# Patient Record
Sex: FEMALE | Race: WHITE | Employment: OTHER | URBAN - METROPOLITAN AREA
[De-identification: names, ages, dates, MRNs, and addresses within clinical notes are randomized per-mention and may not be internally consistent; named-entity substitution may affect disease eponyms.]

---

## 2017-11-30 PROCEDURE — 81001 URINALYSIS AUTO W/SCOPE: CPT | Performed by: FAMILY MEDICINE

## 2018-04-25 PROCEDURE — 82607 VITAMIN B-12: CPT | Performed by: FAMILY MEDICINE

## 2018-06-07 PROBLEM — M65.932 TENOSYNOVITIS OF LEFT WRIST: Status: ACTIVE | Noted: 2018-06-07

## 2018-06-07 PROBLEM — M65.9 TENOSYNOVITIS OF LEFT WRIST: Status: ACTIVE | Noted: 2018-06-07

## 2018-07-03 PROCEDURE — 82607 VITAMIN B-12: CPT | Performed by: FAMILY MEDICINE

## 2018-07-19 PROBLEM — Z47.89 ORTHOPEDIC AFTERCARE: Status: ACTIVE | Noted: 2018-07-19

## 2018-08-15 PROBLEM — M25.632 STIFFNESS OF LEFT WRIST JOINT: Status: ACTIVE | Noted: 2018-08-15

## 2018-11-13 PROBLEM — N81.4 UTEROVAGINAL PROLAPSE: Status: ACTIVE | Noted: 2018-11-13

## 2018-11-13 PROBLEM — N39.3 STRESS INCONTINENCE: Status: ACTIVE | Noted: 2018-11-13

## 2018-11-13 PROBLEM — N39.41 URGE INCONTINENCE: Status: ACTIVE | Noted: 2018-11-13

## 2018-11-28 PROCEDURE — 87624 HPV HI-RISK TYP POOLED RSLT: CPT | Performed by: OBSTETRICS & GYNECOLOGY

## 2018-11-28 PROCEDURE — 88175 CYTOPATH C/V AUTO FLUID REDO: CPT | Performed by: OBSTETRICS & GYNECOLOGY

## 2019-01-20 ENCOUNTER — HOSPITAL ENCOUNTER (INPATIENT)
Facility: HOSPITAL | Age: 64
LOS: 4 days | Discharge: HOME | DRG: 287 | End: 2019-01-24
Attending: EMERGENCY MEDICINE | Admitting: FAMILY MEDICINE
Payer: COMMERCIAL

## 2019-01-20 ENCOUNTER — APPOINTMENT (OUTPATIENT)
Dept: GENERAL RADIOLOGY | Facility: HOSPITAL | Age: 64
DRG: 287 | End: 2019-01-20
Attending: EMERGENCY MEDICINE
Payer: COMMERCIAL

## 2019-01-20 DIAGNOSIS — R77.8 TROPONIN I ABOVE REFERENCE RANGE: ICD-10-CM

## 2019-01-20 DIAGNOSIS — D64.9 ANEMIA, UNSPECIFIED TYPE: ICD-10-CM

## 2019-01-20 DIAGNOSIS — R07.89 CHEST PAIN, ATYPICAL: Primary | ICD-10-CM

## 2019-01-20 DIAGNOSIS — G43.909 MIGRAINE WITHOUT STATUS MIGRAINOSUS, NOT INTRACTABLE, UNSPECIFIED MIGRAINE TYPE: ICD-10-CM

## 2019-01-20 PROBLEM — R79.89 TROPONIN I ABOVE REFERENCE RANGE: Status: ACTIVE | Noted: 2019-01-20

## 2019-01-20 LAB
ADENOVIRUS PCR:: NEGATIVE
ALBUMIN SERPL-MCNC: 3.5 G/DL (ref 3.1–4.5)
ALBUMIN/GLOB SERPL: 0.8 {RATIO} (ref 1–2)
ALP LIVER SERPL-CCNC: 125 U/L (ref 50–130)
ALT SERPL-CCNC: 23 U/L (ref 14–54)
ANION GAP SERPL CALC-SCNC: 4 MMOL/L (ref 0–18)
APTT PPP: 32.5 SECONDS (ref 26.1–34.6)
APTT PPP: 60.6 SECONDS (ref 26.1–34.6)
AST SERPL-CCNC: 23 U/L (ref 15–41)
B PERT DNA SPEC QL NAA+PROBE: NEGATIVE
BASOPHILS # BLD AUTO: 0.05 X10(3) UL (ref 0–0.1)
BASOPHILS NFR BLD AUTO: 0.9 %
BILIRUB SERPL-MCNC: 0.2 MG/DL (ref 0.1–2)
BUN BLD-MCNC: 12 MG/DL (ref 8–20)
BUN/CREAT SERPL: 12.6 (ref 10–20)
C PNEUM DNA SPEC QL NAA+PROBE: NEGATIVE
CALCIUM BLD-MCNC: 8.5 MG/DL (ref 8.3–10.3)
CHLORIDE SERPL-SCNC: 105 MMOL/L (ref 101–111)
CK SERPL-CCNC: 174 IU/L (ref 26–192)
CO2 SERPL-SCNC: 27 MMOL/L (ref 22–32)
CORONAVIRUS 229E PCR:: NEGATIVE
CORONAVIRUS HKU1 PCR:: NEGATIVE
CORONAVIRUS NL63 PCR:: NEGATIVE
CORONAVIRUS OC43 PCR:: POSITIVE
CREAT BLD-MCNC: 0.95 MG/DL (ref 0.55–1.02)
EOSINOPHIL # BLD AUTO: 0.5 X10(3) UL (ref 0–0.3)
EOSINOPHIL NFR BLD AUTO: 8.8 %
ERYTHROCYTE [DISTWIDTH] IN BLOOD BY AUTOMATED COUNT: 14.8 % (ref 11.5–16)
FLUAV RNA SPEC QL NAA+PROBE: NEGATIVE
FLUBV RNA SPEC QL NAA+PROBE: NEGATIVE
GLOBULIN PLAS-MCNC: 4.3 G/DL (ref 2.8–4.4)
GLUCOSE BLD-MCNC: 130 MG/DL (ref 70–99)
HCT VFR BLD AUTO: 36 % (ref 34–50)
HGB BLD-MCNC: 11.2 G/DL (ref 12–16)
IMMATURE GRANULOCYTE COUNT: 0.01 X10(3) UL (ref 0–1)
IMMATURE GRANULOCYTE RATIO %: 0.2 %
LYMPHOCYTES # BLD AUTO: 1.13 X10(3) UL (ref 0.9–4)
LYMPHOCYTES NFR BLD AUTO: 19.9 %
M PROTEIN MFR SERPL ELPH: 7.8 G/DL (ref 6.4–8.2)
MCH RBC QN AUTO: 23.8 PG (ref 27–33.2)
MCHC RBC AUTO-ENTMCNC: 31.1 G/DL (ref 31–37)
MCV RBC AUTO: 76.6 FL (ref 81–100)
METAPNEUMOVIRUS PCR:: NEGATIVE
MONOCYTES # BLD AUTO: 0.71 X10(3) UL (ref 0.1–1)
MONOCYTES NFR BLD AUTO: 12.5 %
MYCOPLASMA PNEUMONIA PCR:: NEGATIVE
NEUTROPHIL ABS PRELIM: 3.27 X10 (3) UL (ref 1.3–6.7)
NEUTROPHILS # BLD AUTO: 3.27 X10(3) UL (ref 1.3–6.7)
NEUTROPHILS NFR BLD AUTO: 57.7 %
OSMOLALITY SERPL CALC.SUM OF ELEC: 284 MOSM/KG (ref 275–295)
PARAINFLUENZA 1 PCR:: NEGATIVE
PARAINFLUENZA 2 PCR:: NEGATIVE
PARAINFLUENZA 3 PCR:: NEGATIVE
PARAINFLUENZA 4 PCR:: NEGATIVE
PLATELET # BLD AUTO: 264 10(3)UL (ref 150–450)
POTASSIUM SERPL-SCNC: 3.8 MMOL/L (ref 3.6–5.1)
RBC # BLD AUTO: 4.7 X10(6)UL (ref 3.8–5.1)
RED CELL DISTRIBUTION WIDTH-SD: 41.1 FL (ref 35.1–46.3)
RHINOVIRUS/ENTERO PCR:: NEGATIVE
RSV RNA SPEC QL NAA+PROBE: NEGATIVE
SODIUM SERPL-SCNC: 136 MMOL/L (ref 136–144)
TROPONIN I SERPL-MCNC: 0.11 NG/ML (ref ?–0.05)
TROPONIN I SERPL-MCNC: 1.04 NG/ML (ref ?–0.05)
WBC # BLD AUTO: 5.7 X10(3) UL (ref 4–13)

## 2019-01-20 PROCEDURE — 85730 THROMBOPLASTIN TIME PARTIAL: CPT | Performed by: INTERNAL MEDICINE

## 2019-01-20 PROCEDURE — 93005 ELECTROCARDIOGRAM TRACING: CPT

## 2019-01-20 PROCEDURE — 87581 M.PNEUMON DNA AMP PROBE: CPT | Performed by: EMERGENCY MEDICINE

## 2019-01-20 PROCEDURE — 85730 THROMBOPLASTIN TIME PARTIAL: CPT | Performed by: EMERGENCY MEDICINE

## 2019-01-20 PROCEDURE — 94664 DEMO&/EVAL PT USE INHALER: CPT

## 2019-01-20 PROCEDURE — 84484 ASSAY OF TROPONIN QUANT: CPT | Performed by: EMERGENCY MEDICINE

## 2019-01-20 PROCEDURE — 94640 AIRWAY INHALATION TREATMENT: CPT

## 2019-01-20 PROCEDURE — 87999 UNLISTED MICROBIOLOGY PX: CPT

## 2019-01-20 PROCEDURE — 99285 EMERGENCY DEPT VISIT HI MDM: CPT | Performed by: EMERGENCY MEDICINE

## 2019-01-20 PROCEDURE — 80053 COMPREHEN METABOLIC PANEL: CPT | Performed by: EMERGENCY MEDICINE

## 2019-01-20 PROCEDURE — 96375 TX/PRO/DX INJ NEW DRUG ADDON: CPT | Performed by: EMERGENCY MEDICINE

## 2019-01-20 PROCEDURE — 71046 X-RAY EXAM CHEST 2 VIEWS: CPT | Performed by: EMERGENCY MEDICINE

## 2019-01-20 PROCEDURE — 87633 RESP VIRUS 12-25 TARGETS: CPT | Performed by: EMERGENCY MEDICINE

## 2019-01-20 PROCEDURE — 85025 COMPLETE CBC W/AUTO DIFF WBC: CPT | Performed by: EMERGENCY MEDICINE

## 2019-01-20 PROCEDURE — 87798 DETECT AGENT NOS DNA AMP: CPT | Performed by: EMERGENCY MEDICINE

## 2019-01-20 PROCEDURE — 93010 ELECTROCARDIOGRAM REPORT: CPT | Performed by: EMERGENCY MEDICINE

## 2019-01-20 PROCEDURE — 82550 ASSAY OF CK (CPK): CPT | Performed by: EMERGENCY MEDICINE

## 2019-01-20 PROCEDURE — 96374 THER/PROPH/DIAG INJ IV PUSH: CPT | Performed by: EMERGENCY MEDICINE

## 2019-01-20 PROCEDURE — 87486 CHLMYD PNEUM DNA AMP PROBE: CPT | Performed by: EMERGENCY MEDICINE

## 2019-01-20 RX ORDER — ALBUTEROL SULFATE 90 UG/1
2 AEROSOL, METERED RESPIRATORY (INHALATION) EVERY 6 HOURS PRN
Status: DISCONTINUED | OUTPATIENT
Start: 2019-01-20 | End: 2019-01-24

## 2019-01-20 RX ORDER — HEPARIN SODIUM AND DEXTROSE 10000; 5 [USP'U]/100ML; G/100ML
INJECTION INTRAVENOUS CONTINUOUS
Status: DISCONTINUED | OUTPATIENT
Start: 2019-01-20 | End: 2019-01-21

## 2019-01-20 RX ORDER — ONDANSETRON 2 MG/ML
4 INJECTION INTRAMUSCULAR; INTRAVENOUS ONCE
Status: COMPLETED | OUTPATIENT
Start: 2019-01-20 | End: 2019-01-20

## 2019-01-20 RX ORDER — POTASSIUM CHLORIDE 20 MEQ/1
40 TABLET, EXTENDED RELEASE ORAL ONCE
Status: COMPLETED | OUTPATIENT
Start: 2019-01-20 | End: 2019-01-20

## 2019-01-20 RX ORDER — VERAPAMIL HYDROCHLORIDE 240 MG/1
240 TABLET, FILM COATED, EXTENDED RELEASE ORAL
Status: DISCONTINUED | OUTPATIENT
Start: 2019-01-21 | End: 2019-01-24

## 2019-01-20 RX ORDER — FAMOTIDINE 20 MG/1
20 TABLET ORAL 2 TIMES DAILY PRN
Status: ON HOLD | COMMUNITY
End: 2019-01-22

## 2019-01-20 RX ORDER — SIMVASTATIN 40 MG
40 TABLET ORAL NIGHTLY
Status: ON HOLD | COMMUNITY
End: 2019-01-22

## 2019-01-20 RX ORDER — ATORVASTATIN CALCIUM 20 MG/1
20 TABLET, FILM COATED ORAL NIGHTLY
Status: DISCONTINUED | OUTPATIENT
Start: 2019-01-20 | End: 2019-01-24

## 2019-01-20 RX ORDER — ASPIRIN 81 MG/1
324 TABLET, CHEWABLE ORAL ONCE
Status: DISCONTINUED | OUTPATIENT
Start: 2019-01-20 | End: 2019-01-21

## 2019-01-20 RX ORDER — HYDROMORPHONE HYDROCHLORIDE 1 MG/ML
0.5 INJECTION, SOLUTION INTRAMUSCULAR; INTRAVENOUS; SUBCUTANEOUS ONCE
Status: COMPLETED | OUTPATIENT
Start: 2019-01-20 | End: 2019-01-20

## 2019-01-20 RX ORDER — FAMOTIDINE 20 MG/1
20 TABLET ORAL 2 TIMES DAILY PRN
Status: DISCONTINUED | OUTPATIENT
Start: 2019-01-20 | End: 2019-01-24

## 2019-01-20 RX ORDER — HEPARIN SODIUM 5000 [USP'U]/ML
60 INJECTION INTRAVENOUS; SUBCUTANEOUS ONCE
Status: COMPLETED | OUTPATIENT
Start: 2019-01-20 | End: 2019-01-20

## 2019-01-20 RX ORDER — LEVOTHYROXINE SODIUM 88 UG/1
88 TABLET ORAL
Status: DISCONTINUED | OUTPATIENT
Start: 2019-01-21 | End: 2019-01-24

## 2019-01-20 RX ORDER — TRAMADOL HYDROCHLORIDE 50 MG/1
50 TABLET ORAL DAILY PRN
Status: DISCONTINUED | OUTPATIENT
Start: 2019-01-20 | End: 2019-01-21

## 2019-01-20 RX ORDER — IPRATROPIUM BROMIDE AND ALBUTEROL SULFATE 2.5; .5 MG/3ML; MG/3ML
3 SOLUTION RESPIRATORY (INHALATION) ONCE
Status: COMPLETED | OUTPATIENT
Start: 2019-01-20 | End: 2019-01-20

## 2019-01-20 RX ORDER — DEXTROSE AND SODIUM CHLORIDE 5; .45 G/100ML; G/100ML
INJECTION, SOLUTION INTRAVENOUS CONTINUOUS
Status: DISCONTINUED | OUTPATIENT
Start: 2019-01-20 | End: 2019-01-22

## 2019-01-20 RX ORDER — HEPARIN SODIUM AND DEXTROSE 10000; 5 [USP'U]/100ML; G/100ML
12 INJECTION INTRAVENOUS ONCE
Status: COMPLETED | OUTPATIENT
Start: 2019-01-20 | End: 2019-01-20

## 2019-01-20 RX ORDER — CETIRIZINE HYDROCHLORIDE 10 MG/1
10 TABLET ORAL DAILY
Status: DISCONTINUED | OUTPATIENT
Start: 2019-01-20 | End: 2019-01-24

## 2019-01-20 NOTE — PLAN OF CARE
Patient came from ER via stretcher to room 3208. Patient aox3, vss,ra, lungs clear, no edema. NSR, +2 pedals and radials. Troponin positive. Heparin gtt at 9 cc/hr. Next PTT level at 1930. Admission database completed.  Positive for rhinovirus.   k 3.8

## 2019-01-20 NOTE — ED INITIAL ASSESSMENT (HPI)
Arrives via [facility] with c/o chest pain and cough. Cough worsens chest pain. Has had cough for a couple of days.

## 2019-01-20 NOTE — CONSULTS
[facility] Cardiology  Consultation Note      Kennethjose a Uri Patient Status:  Inpatient    1955 MRN [id number]   [facility] 8NE-A Attending Kennon Riedel, DO   Hosp Day # 0 PCP Bee Granado MD     Reason for consultation NaCl infusion  Intravenous Continuous   heparin (PORCINE) 80312fkjsr/250mL infusion ED INITIAL DOSE 12 Units/kg/hr Intravenous Once   heparin (PORCINE) drip 24629nofpi/250mL infusion CONTINUOUS 200-3,000 Units/hr Intravenous Continuous   Potassium Chloride sister; Obesity in her daughter; Other in her daughter, father, sister, and son. Social History   reports that  has never smoked. she has never used smokeless tobacco. She reports that she drinks alcohol. She reports that she does not use drugs.      All appropriately  Dermatologic: No rashes; normal skin turgor    Diagnostic testing:    EKG: Normal sinus rhythm with diffuse TWIs in the anterior leads.      Labs:   Lab Results   Component Value Date    PT 13.8 07/31/2011    INR 1.05 08/14/2014    INR 1.05 0

## 2019-01-20 NOTE — ED NOTES
Report called to [facility], at [phone]. Per Dr. Eddy Alexandre, patient may go upstairs.

## 2019-01-20 NOTE — ED PROVIDER NOTES
Patient Seen in: [facility] Emergency Department    History   Patient presents with:  Chest Pain Angina (cardiovascular)  Cough/URI    Stated Complaint: cough, chest pain    HPI    42-year-old female presents to the emergency department with complai SURGICAL HISTORY      R SHOULDER revision RCR   • OTHER SURGICAL HISTORY      B LARGE TOENAIL REMOVAL DUE TO SEVERE ONYCHOMYCOSIS   • OTHER SURGICAL HISTORY  7/2011    R  KNEE  ARTHROSCOPY   • TENDON REPAIR WRIST, HAND, FINGER Left 7/18/2018    Performed b Musculoskeletal: Normal range of motion. She exhibits no edema or tenderness. Lymphadenopathy:     She has no cervical adenopathy. Neurological: She is alert and oriented to person, place, and time. She has normal reflexes. No cranial nerve deficit. CREATININE) - Normal   PTT, ACTIVATED - Normal   POTASSIUM - Normal   CBC WITH DIFFERENTIAL WITH PLATELET    Narrative: The following orders were created for panel order CBC WITH DIFFERENTIAL WITH PLATELET.   Procedure                               Abno fluid overload. She was noted to have a mildly elevated troponin. She had a 2-hour troponin that was even more elevated. She has a normal CPK.   I did contact cardiology after the first troponin and we discussed everything after the second troponin as we discussions with the patient, family, and clinical staff.

## 2019-01-21 ENCOUNTER — APPOINTMENT (OUTPATIENT)
Dept: INTERVENTIONAL RADIOLOGY/VASCULAR | Facility: HOSPITAL | Age: 64
DRG: 287 | End: 2019-01-21
Attending: INTERNAL MEDICINE
Payer: COMMERCIAL

## 2019-01-21 LAB
ALBUMIN SERPL-MCNC: 3.5 G/DL (ref 3.1–4.5)
ALBUMIN/GLOB SERPL: 0.8 {RATIO} (ref 1–2)
ALP LIVER SERPL-CCNC: 123 U/L (ref 50–130)
ALT SERPL-CCNC: 23 U/L (ref 14–54)
ANION GAP SERPL CALC-SCNC: 6 MMOL/L (ref 0–18)
APTT PPP: 62.9 SECONDS (ref 26.1–34.6)
AST SERPL-CCNC: 33 U/L (ref 15–41)
ATRIAL RATE: 86 BPM
ATRIAL RATE: 94 BPM
BASOPHILS # BLD AUTO: 0.04 X10(3) UL (ref 0–0.1)
BASOPHILS NFR BLD AUTO: 0.6 %
BILIRUB SERPL-MCNC: 0.3 MG/DL (ref 0.1–2)
BUN BLD-MCNC: 10 MG/DL (ref 8–20)
BUN/CREAT SERPL: 11.1 (ref 10–20)
CALCIUM BLD-MCNC: 8.8 MG/DL (ref 8.3–10.3)
CHLORIDE SERPL-SCNC: 105 MMOL/L (ref 101–111)
CO2 SERPL-SCNC: 27 MMOL/L (ref 22–32)
CREAT BLD-MCNC: 0.9 MG/DL (ref 0.55–1.02)
EOSINOPHIL # BLD AUTO: 0.48 X10(3) UL (ref 0–0.3)
EOSINOPHIL NFR BLD AUTO: 7.1 %
ERYTHROCYTE [DISTWIDTH] IN BLOOD BY AUTOMATED COUNT: 15.1 % (ref 11.5–16)
GLOBULIN PLAS-MCNC: 4.2 G/DL (ref 2.8–4.4)
GLUCOSE BLD-MCNC: 90 MG/DL (ref 70–99)
HCT VFR BLD AUTO: 35.9 % (ref 34–50)
HGB BLD-MCNC: 11 G/DL (ref 12–16)
IMMATURE GRANULOCYTE COUNT: 0.02 X10(3) UL (ref 0–1)
IMMATURE GRANULOCYTE RATIO %: 0.3 %
INR BLD: 1.01 (ref 0.9–1.1)
LYMPHOCYTES # BLD AUTO: 2.39 X10(3) UL (ref 0.9–4)
LYMPHOCYTES NFR BLD AUTO: 35.3 %
M PROTEIN MFR SERPL ELPH: 7.7 G/DL (ref 6.4–8.2)
MCH RBC QN AUTO: 23.7 PG (ref 27–33.2)
MCHC RBC AUTO-ENTMCNC: 30.6 G/DL (ref 31–37)
MCV RBC AUTO: 77.2 FL (ref 81–100)
MONOCYTES # BLD AUTO: 0.73 X10(3) UL (ref 0.1–1)
MONOCYTES NFR BLD AUTO: 10.8 %
NEUTROPHIL ABS PRELIM: 3.12 X10 (3) UL (ref 1.3–6.7)
NEUTROPHILS # BLD AUTO: 3.12 X10(3) UL (ref 1.3–6.7)
NEUTROPHILS NFR BLD AUTO: 45.9 %
OSMOLALITY SERPL CALC.SUM OF ELEC: 285 MOSM/KG (ref 275–295)
P AXIS: 58 DEGREES
P AXIS: 64 DEGREES
P-R INTERVAL: 144 MS
P-R INTERVAL: 152 MS
PLATELET # BLD AUTO: 260 10(3)UL (ref 150–450)
POTASSIUM SERPL-SCNC: 4.3 MMOL/L (ref 3.6–5.1)
POTASSIUM SERPL-SCNC: 4.3 MMOL/L (ref 3.6–5.1)
PSA SERPL DL<=0.01 NG/ML-MCNC: 13.7 SECONDS (ref 12.4–14.7)
Q-T INTERVAL: 366 MS
Q-T INTERVAL: 378 MS
QRS DURATION: 84 MS
QRS DURATION: 90 MS
QTC CALCULATION (BEZET): 452 MS
QTC CALCULATION (BEZET): 457 MS
R AXIS: 51 DEGREES
R AXIS: 63 DEGREES
RBC # BLD AUTO: 4.65 X10(6)UL (ref 3.8–5.1)
RED CELL DISTRIBUTION WIDTH-SD: 41.6 FL (ref 35.1–46.3)
SODIUM SERPL-SCNC: 138 MMOL/L (ref 136–144)
T AXIS: 116 DEGREES
T AXIS: 65 DEGREES
VENTRICULAR RATE: 86 BPM
VENTRICULAR RATE: 94 BPM
WBC # BLD AUTO: 6.8 X10(3) UL (ref 4–13)

## 2019-01-21 PROCEDURE — 4A023N7 MEASUREMENT OF CARDIAC SAMPLING AND PRESSURE, LEFT HEART, PERCUTANEOUS APPROACH: ICD-10-PCS | Performed by: INTERNAL MEDICINE

## 2019-01-21 PROCEDURE — B2151ZZ FLUOROSCOPY OF LEFT HEART USING LOW OSMOLAR CONTRAST: ICD-10-PCS | Performed by: INTERNAL MEDICINE

## 2019-01-21 PROCEDURE — 36415 COLL VENOUS BLD VENIPUNCTURE: CPT

## 2019-01-21 PROCEDURE — 80053 COMPREHEN METABOLIC PANEL: CPT | Performed by: FAMILY MEDICINE

## 2019-01-21 PROCEDURE — 84132 ASSAY OF SERUM POTASSIUM: CPT | Performed by: FAMILY MEDICINE

## 2019-01-21 PROCEDURE — 99152 MOD SED SAME PHYS/QHP 5/>YRS: CPT

## 2019-01-21 PROCEDURE — 85730 THROMBOPLASTIN TIME PARTIAL: CPT | Performed by: FAMILY MEDICINE

## 2019-01-21 PROCEDURE — 93458 L HRT ARTERY/VENTRICLE ANGIO: CPT

## 2019-01-21 PROCEDURE — 85025 COMPLETE CBC W/AUTO DIFF WBC: CPT | Performed by: FAMILY MEDICINE

## 2019-01-21 PROCEDURE — 85610 PROTHROMBIN TIME: CPT | Performed by: INTERNAL MEDICINE

## 2019-01-21 PROCEDURE — 99153 MOD SED SAME PHYS/QHP EA: CPT

## 2019-01-21 PROCEDURE — 84484 ASSAY OF TROPONIN QUANT: CPT | Performed by: INTERNAL MEDICINE

## 2019-01-21 PROCEDURE — B2111ZZ FLUOROSCOPY OF MULTIPLE CORONARY ARTERIES USING LOW OSMOLAR CONTRAST: ICD-10-PCS | Performed by: INTERNAL MEDICINE

## 2019-01-21 RX ORDER — BUTALBITAL, ACETAMINOPHEN AND CAFFEINE 50; 325; 40 MG/1; MG/1; MG/1
1 TABLET ORAL EVERY 4 HOURS PRN
Status: DISCONTINUED | OUTPATIENT
Start: 2019-01-21 | End: 2019-01-24

## 2019-01-21 RX ORDER — CODEINE PHOSPHATE AND GUAIFENESIN 10; 100 MG/5ML; MG/5ML
15 SOLUTION ORAL NIGHTLY PRN
Status: DISCONTINUED | OUTPATIENT
Start: 2019-01-21 | End: 2019-01-24

## 2019-01-21 RX ORDER — TRAMADOL HYDROCHLORIDE 50 MG/1
50 TABLET ORAL EVERY 6 HOURS PRN
Status: DISCONTINUED | OUTPATIENT
Start: 2019-01-21 | End: 2019-01-24

## 2019-01-21 RX ORDER — LISINOPRIL 5 MG/1
5 TABLET ORAL DAILY
Status: DISCONTINUED | OUTPATIENT
Start: 2019-01-21 | End: 2019-01-24

## 2019-01-21 RX ORDER — ASPIRIN 81 MG/1
81 TABLET, CHEWABLE ORAL DAILY
Status: DISCONTINUED | OUTPATIENT
Start: 2019-01-21 | End: 2019-01-24

## 2019-01-21 RX ORDER — FAMOTIDINE 20 MG/1
20 TABLET ORAL DAILY
Status: DISCONTINUED | OUTPATIENT
Start: 2019-01-21 | End: 2019-01-24

## 2019-01-21 RX ORDER — SODIUM CHLORIDE 9 MG/ML
INJECTION, SOLUTION INTRAVENOUS CONTINUOUS
Status: DISCONTINUED | OUTPATIENT
Start: 2019-01-21 | End: 2019-01-22

## 2019-01-21 RX ORDER — ASPIRIN 81 MG/1
324 TABLET, CHEWABLE ORAL ONCE
Status: COMPLETED | OUTPATIENT
Start: 2019-01-21 | End: 2019-01-21

## 2019-01-21 RX ORDER — MIDAZOLAM HYDROCHLORIDE 1 MG/ML
INJECTION INTRAMUSCULAR; INTRAVENOUS
Status: COMPLETED
Start: 2019-01-21 | End: 2019-01-21

## 2019-01-21 RX ORDER — GUAIFENESIN 600 MG
1200 TABLET, EXTENDED RELEASE 12 HR ORAL 2 TIMES DAILY
Status: DISCONTINUED | OUTPATIENT
Start: 2019-01-21 | End: 2019-01-21

## 2019-01-21 RX ORDER — GUAIFENESIN 600 MG
1200 TABLET, EXTENDED RELEASE 12 HR ORAL DAILY
Status: DISCONTINUED | OUTPATIENT
Start: 2019-01-22 | End: 2019-01-24

## 2019-01-21 RX ORDER — SODIUM CHLORIDE 9 MG/ML
INJECTION, SOLUTION INTRAVENOUS CONTINUOUS
Status: ACTIVE | OUTPATIENT
Start: 2019-01-21 | End: 2019-01-21

## 2019-01-21 RX ORDER — BENZONATATE 200 MG/1
200 CAPSULE ORAL 3 TIMES DAILY PRN
Status: DISCONTINUED | OUTPATIENT
Start: 2019-01-21 | End: 2019-01-24

## 2019-01-21 RX ORDER — HEPARIN SODIUM 5000 [USP'U]/ML
INJECTION, SOLUTION INTRAVENOUS; SUBCUTANEOUS
Status: COMPLETED
Start: 2019-01-21 | End: 2019-01-21

## 2019-01-21 RX ORDER — METOPROLOL SUCCINATE 25 MG/1
25 TABLET, EXTENDED RELEASE ORAL
Status: DISCONTINUED | OUTPATIENT
Start: 2019-01-21 | End: 2019-01-24

## 2019-01-21 RX ORDER — CODEINE PHOSPHATE AND GUAIFENESIN 10; 100 MG/5ML; MG/5ML
10 SOLUTION ORAL NIGHTLY PRN
Status: DISCONTINUED | OUTPATIENT
Start: 2019-01-21 | End: 2019-01-24

## 2019-01-21 RX ORDER — CODEINE PHOSPHATE AND GUAIFENESIN 10; 100 MG/5ML; MG/5ML
5 SOLUTION ORAL EVERY 4 HOURS PRN
Status: DISCONTINUED | OUTPATIENT
Start: 2019-01-21 | End: 2019-01-21

## 2019-01-21 RX ORDER — LIDOCAINE HYDROCHLORIDE 10 MG/ML
INJECTION, SOLUTION EPIDURAL; INFILTRATION; INTRACAUDAL; PERINEURAL
Status: COMPLETED
Start: 2019-01-21 | End: 2019-01-21

## 2019-01-21 NOTE — PAYOR COMM NOTE
--------------  ADMISSION REVIEW     Payor: [facility]  Subscriber #:  [id number]  Authorization Number: [id number]    Admit date: 1/20/19  Admit time: 12       Admitting Physician: Chinedu Nieto DO  Attending Physician:  Chinedu Nieto DO  Primary Ca • Hyperlipidemia LDL goal < 100 7/18/2011   • Migraine    • Migraine 7/18/2001   • Osteopenia 12/1/2011   • Other and unspecified hyperlipidemia    • Pre-diabetes 3/18/2002   • Unspecified essential hypertension    • Vitamin D deficiency 1/13/2013       Pa Constitutional: She is oriented to person, place, and time. She appears well-developed and well-nourished. HENT:   Head: Normocephalic and atraumatic. Nose: Mucosal edema and rhinorrhea present.    Mouth/Throat: Oropharynx is clear and moist.   Eyes: EO All other components within normal limits   RESPIRATORY PANEL FLU EXPANDED - Abnormal; Notable for the following components:    Coronavirus Oc43 PCR: Positive (*)     All other components within normal limits   CBC W/ DIFFERENTIAL - Abnormal; Notable for Reading: Occasional PVC increased voltage with T wave inversion V4 through V6              Xr Chest Pa + Lat Chest (cpt=71046)    Result Date: 1/20/2019  CONCLUSION:    Moderate size hiatal hernia present. Borderline heart size.   No CHF, pneumonia, pleura Troponin I above reference range  Anemia, unspecified type    Disposition:  Admit  1/20/2019 12:30 pm    Follow-up:  No follow-up provider specified.       Medications Prescribed:  Current Discharge Medication List        Present on Admission  Date Reviewed 1/21/2019 0935 Given 150 mL Injection Daniel Murdock MD      Levothyroxine Sodium (SYNTHROID, LEVOTHROID) tab 88 mcg     Date Action Dose Route User    1/21/2019 0559 Given 88 mcg Oral Tash Elizabeth, RN      lisinopril (PRINIVIL,ZESTRI Hosp Day # 0 PCP Yadi Arita MD      Reason for consultation:  Chest pain     Impression:  1. Chest pain with significantly elevated troponin level and new EKG abnormalities  2.  Coronary spasm of OM, s/p angiogram on 7/31/11.  Residual hypokinesis of in heparin (PORCINE) drip 84974nnkjm/250mL infusion CONTINUOUS 200-3,000 Units/hr Intravenous Continuous   Potassium Chloride ER (K-DUR M20) CR tab 40 mEq 40 mEq Oral Once              Past Medical History:   Diagnosis Date   • AF (atrial fibrillation) RESOLV reports that  has never smoked.  she has never used smokeless tobacco.  She reports that she does not use drugs.      Allergies     Triptans                    Comment:CONTRAINDICATED DUE TO CORONARY ARTERY VASOSPASM        Review of Systems:  Constitution EKG: Normal sinus rhythm with diffuse TWIs in the anterior leads.      Labs:         Lab Results   Component Value Date     PT 13.8 07/31/2011     INR 1.05 08/14/2014     INR 1.05 07/31/2011            Lab Results   Component Value Date     WBC 5.7 01/20/2 Chart Review: Note Routing History     Routing history could not be found for this note. This is because the note has never been routed or because communication record creation was suppressed.      PLEASE FAX DAYS CERTIFIED AND NEXT REVIEW DATE

## 2019-01-21 NOTE — PROGRESS NOTES
01/21/19 1343   Clinical Encounter Type   Visited With Patient and family together   Patient's Supportive Strategies/Resources Father Rosi Albertluisamarjit provided prayer, Scripture, support and Sacrament of the Sick.

## 2019-01-21 NOTE — PROCEDURES
[facility] Cardiology      Procedure:  LHC, CORONARY ANGIO, LV ANGIO,                          PERCLOSE RFA      Findings    LVEF: 35%    LM: NORMAL    LCx: NORMAL    LAD: NORMAL    DIAG; + SPONTANEOUS CORONARY ARTERY DISSECTION    RCA: NORMAL

## 2019-01-21 NOTE — PLAN OF CARE
CARDIOVASCULAR - ADULT    • Maintains optimal cardiac output and hemodynamic stability Progressing    • Absence of cardiac arrhythmias or at baseline Progressing          Assumed care of pt at 1930. Pt is alert and oriented x4.  Pt is on RA with SpO2 at 96%

## 2019-01-21 NOTE — H&P
[address]  History & Physical  [facility] Primary Care Physician         Avril Michael Patient Status:  Inpatient    1955 MRN [id number]   [facility] 8NE-A Attending Janneth Paul,    Hosp Day # 0 P Albuterol Sulfate HFA (PROAIR HFA) 108 (90 Base) MCG/ACT Inhalation Aero Soln Inhale 2 puffs into the lungs every 6 (six) hours as needed for Wheezing.  Disp: 3 Inhaler Rfl: 1   Butalbital-APAP-Caffeine -40 MG Oral Tab TAKE 1 TABLET BY MOUTH at headac • TENDON REPAIR WRIST, HAND, FINGER Left 7/18/2018    Performed by Eva Mcclain MD at [facility]   • TONSILLECTOMY      T&A       SOCIAL HISTORY:  Social History    Tobacco Use      Smoking status: Never Smoker      Smokeless tobacco: Elena Sharma GI: Soft, non-tender, non-distended. Bowel sounds present. No rebound tenderness. MUSCULOSKELETAL: back is not tender, FROM of the back  BACK: No CVA tenderness   EXTREMITIES: No lower extremity cyanosis, clubbing or edema  NEURO: AAO x 3.  No focal defic Collection Time: 01/20/19 10:25 AM   Result Value Ref Range    Troponin 0.113 (HH) <0.046 ng/mL   CBC W/ DIFFERENTIAL    Collection Time: 01/20/19 10:25 AM   Result Value Ref Range    WBC 5.7 4.0 - 13.0 x10(3) uL    RBC 4.70 3.80 - 5.10 x10(6)uL    HGB 11 Parainlfuenza 4 PCR Negative Negative    Resp Syncytial Virus PCR Negative Negative    Bordetella Pertussis PCR Negative Negative    Chlamydia pneumonia PCR: Negative Negative    Mycoplasma pneumonia PCR: Negative Negative   EKG 12-LEAD    Collection Time

## 2019-01-21 NOTE — PROGRESS NOTES
[address] [facility] Cardiology Progress Note        Porfirio Lagunas Patient Status:  Inpatient    1955 MRN [id number]   [facility] 8NE-A Attending Kareem Green, DO   Hosp Day # 1 PCP Mis Booth MD     Subjective:  Misa Azevedo Neurologic: Alert and oriented, normal affect. Skin: Warm and dry.            LABS:      HEM:  Recent Labs   Lab  01/20/19   1025  01/21/19   0454   WBC  5.7  6.8   HGB  11.2*  11.0*   HCT  36.0  35.9   PLT  264.0  260.0       Chem:  Recent Labs   Lab  0

## 2019-01-21 NOTE — PROCEDURES
[address]    PATIENT'S NAME: Gabino Kumar GANESH   ATTENDING PHYSICIAN: Man Jimenez DO   OPERATING PHYSICIAN: Jorene Rubinstein, M.D.    PATIENT ACCOUNT#:   [de-identified]    LOCATION:  [address] [place] [address]  MEDICAL RECORD #:   [id number]       DATE OF left circumflex artery system is normal.  The previous area of dissection/spasm now appears normal with no residual pathology seen. The LAD is a large vessel, gives rise to a large bifurcating diagonal artery.   The anterior limb of the diagonal artery melissa

## 2019-01-21 NOTE — PROGRESS NOTES
[facility]  Progress Note    Romeo Chiu Patient Status:  Inpatient    1955 MRN [id number]   [facility] 8NE-A Attending Edie Mei MD   Hosp Day # 1 PCP Deborah Bernal MD     Subjective:  Above notes reviewed.     Pt. wit Cardio. HTN= controlled. Acute URI= +coronavirus. Tesalon, mucinex, Taj [employer]. Migraine= worse sincve lay flat; this is usual pattern. Firtonal, tramodol prn,. Pre-diabetes= 1/2018 A1c-5.6%. Glu so far OK.      Hyperlipidemia with target LDL les

## 2019-01-22 ENCOUNTER — APPOINTMENT (OUTPATIENT)
Dept: CV DIAGNOSTICS | Facility: HOSPITAL | Age: 64
DRG: 287 | End: 2019-01-22
Attending: INTERNAL MEDICINE
Payer: COMMERCIAL

## 2019-01-22 ENCOUNTER — APPOINTMENT (OUTPATIENT)
Dept: ULTRASOUND IMAGING | Facility: HOSPITAL | Age: 64
DRG: 287 | End: 2019-01-22
Attending: INTERNAL MEDICINE
Payer: COMMERCIAL

## 2019-01-22 PROBLEM — R07.89 CHEST PAIN, ATYPICAL: Status: RESOLVED | Noted: 2019-01-20 | Resolved: 2019-01-22

## 2019-01-22 LAB
CHLORIDE SERPL-SCNC: 100 MMOL/L (ref 101–111)
CO2 SERPL-SCNC: 28 MMOL/L (ref 22–32)
D-DIMER: 3.35 UG/ML FEU (ref 0–0.49)
HAV IGM SER QL: 2.1 MG/DL (ref 1.8–2.5)
HGB BLD-MCNC: 10.5 G/DL (ref 12–16)
POTASSIUM SERPL-SCNC: 4.2 MMOL/L (ref 3.6–5.1)
SED RATE-ML: 27 MM/HR (ref 0–25)
SODIUM SERPL-SCNC: 133 MMOL/L (ref 136–144)
TROPONIN I SERPL-MCNC: 1.35 NG/ML (ref ?–0.05)
TSI SER-ACNC: 1.4 MIU/ML (ref 0.35–5.5)

## 2019-01-22 PROCEDURE — 93880 EXTRACRANIAL BILAT STUDY: CPT | Performed by: INTERNAL MEDICINE

## 2019-01-22 PROCEDURE — 85652 RBC SED RATE AUTOMATED: CPT | Performed by: FAMILY MEDICINE

## 2019-01-22 PROCEDURE — 84443 ASSAY THYROID STIM HORMONE: CPT | Performed by: FAMILY MEDICINE

## 2019-01-22 PROCEDURE — 93306 TTE W/DOPPLER COMPLETE: CPT | Performed by: INTERNAL MEDICINE

## 2019-01-22 PROCEDURE — 94640 AIRWAY INHALATION TREATMENT: CPT

## 2019-01-22 PROCEDURE — 85378 FIBRIN DEGRADE SEMIQUANT: CPT | Performed by: FAMILY MEDICINE

## 2019-01-22 PROCEDURE — 85018 HEMOGLOBIN: CPT | Performed by: FAMILY MEDICINE

## 2019-01-22 PROCEDURE — 80051 ELECTROLYTE PANEL: CPT | Performed by: FAMILY MEDICINE

## 2019-01-22 PROCEDURE — 83735 ASSAY OF MAGNESIUM: CPT | Performed by: FAMILY MEDICINE

## 2019-01-22 RX ORDER — FAMOTIDINE 20 MG/1
20 TABLET ORAL 2 TIMES DAILY
Qty: 30 TABLET | Refills: 0 | Status: SHIPPED | OUTPATIENT
Start: 2019-01-22 | End: 2019-02-14 | Stop reason: CLARIF

## 2019-01-22 RX ORDER — TRAMADOL HYDROCHLORIDE 50 MG/1
50 TABLET ORAL DAILY PRN
Qty: 90 TABLET | Refills: 0 | Status: SHIPPED | COMMUNITY
Start: 2019-01-22 | End: 2019-08-13

## 2019-01-22 RX ORDER — SIMVASTATIN 40 MG
40 TABLET ORAL NIGHTLY
Qty: 1 TABLET | Refills: 0 | Status: SHIPPED | OUTPATIENT
Start: 2019-01-22 | End: 2019-01-25

## 2019-01-22 RX ORDER — METOPROLOL SUCCINATE 25 MG/1
25 TABLET, EXTENDED RELEASE ORAL
Qty: 30 TABLET | Refills: 0 | Status: SHIPPED | OUTPATIENT
Start: 2019-01-23 | End: 2019-02-14

## 2019-01-22 RX ORDER — LISINOPRIL 5 MG/1
5 TABLET ORAL DAILY
Qty: 30 TABLET | Refills: 0 | Status: SHIPPED | OUTPATIENT
Start: 2019-01-22 | End: 2019-01-24

## 2019-01-22 RX ORDER — MONTELUKAST SODIUM 10 MG/1
10 TABLET ORAL NIGHTLY
Status: DISCONTINUED | OUTPATIENT
Start: 2019-01-22 | End: 2019-01-24

## 2019-01-22 RX ORDER — GUAIFENESIN 1200 MG/1
1200 TABLET, EXTENDED RELEASE ORAL 2 TIMES DAILY
Qty: 60 TABLET | Refills: 0 | Status: SHIPPED | OUTPATIENT
Start: 2019-01-22 | End: 2019-02-14 | Stop reason: ALTCHOICE

## 2019-01-22 RX ORDER — BENZONATATE 200 MG/1
CAPSULE ORAL
Qty: 40 CAPSULE | Refills: 0 | Status: SHIPPED | OUTPATIENT
Start: 2019-01-22 | End: 2019-02-14 | Stop reason: ALTCHOICE

## 2019-01-22 RX ORDER — GUAIFENESIN 600 MG
600 TABLET, EXTENDED RELEASE 12 HR ORAL NIGHTLY
Status: DISCONTINUED | OUTPATIENT
Start: 2019-01-22 | End: 2019-01-24

## 2019-01-22 RX ORDER — CODEINE PHOSPHATE AND GUAIFENESIN 10; 100 MG/5ML; MG/5ML
SOLUTION ORAL
Qty: 240 ML | Refills: 0 | Status: SHIPPED | OUTPATIENT
Start: 2019-01-22 | End: 2019-02-14 | Stop reason: ALTCHOICE

## 2019-01-22 NOTE — PROGRESS NOTES
[address] [facility] Cardiology Progress Note        Kaelyn Zambrano Patient Status:  Inpatient    1955 MRN [id number]   [facility] 8NE-A Attending Alison Myles,    Hosp Day # 2 PCP Xavier Feldman MD     Subjective:  Lisa Otoole Regular S1S2. No S3, S4, rub, click. No murmur. Lungs: Clear to auscultation and percussion. Abdomen: Soft, non-tender. Extremities: No LE edema. No clubbing or cyanosis. Neurologic: Alert and oriented, normal affect. Skin: Warm and dry.

## 2019-01-22 NOTE — PROGRESS NOTES
[facility]  Progress Note    Lowanda Day Patient Status:  Inpatient    1955 MRN [id number]   [facility] 8NE-A Attending Bob Baptiste MD   Hosp Day # 2 PCP Sharath May MD     Subjective:    pER RN  DESAT TO 80% WHILE ASL cardio. and other issues above controlled. Sharath May  1/22/2019  8:29 am    Patient, and dtr by phone,  correctly states their understanding of  the above and expresses  agreement with the plan. >45\" spent with pt.   ADDENDUM (0230 hours)= Mg,TSH

## 2019-01-22 NOTE — PLAN OF CARE
CARDIOVASCULAR - ADULT    • Maintains optimal cardiac output and hemodynamic stability Progressing    • Absence of cardiac arrhythmias or at baseline Progressing        HEMATOLOGIC - ADULT    • Free from bleeding injury Progressing        Patient/Family Go

## 2019-01-22 NOTE — DISCHARGE SUMMARY
[facility]  PRIMARY CARE DISCHARGE SUMMARY   Patient ID:  Kaelyn Zambrano  [id number]  59year old  9/26/1955    Admit date: 1/20/2019    Discharge date and time: 1/24/2019     Attending Physician: Guicho Abbott MD     Primary Care Physician: Anthony Mortensen Elaevated D-dimer with neg. PTCA. +coronavirus. [facility]inex, [facility]. Nebs helping her. IV then po steroids. Antibiox. per CAP. Singulair.   Great imp[rovement by DC and normoxemix.     Ischemic cardiomyopathy = occurred after carornary artery vasos known as:  ROBITUSSIN AC  2-3 tsp q hs prn     lisinopril 5 MG Tabs  Commonly known as:  PRINIVIL,ZESTRIL  Take 1 tablet (5 mg total) by mouth daily. Metoprolol Succinate ER 25 MG Tb24  Commonly known as:   Toprol XL  Take 1 tablet (25 mg total) by mout Tb24  · simvastatin 40 MG Tabs     You can get these medications from any pharmacy    Bring a paper prescription for each of these medications  · guaiFENesin-codeine 100-10 MG/5ML Soln         Consults: IP CONSULT TO FAMILY/INTERNAL MED  IP CONSULT TO CARD

## 2019-01-23 ENCOUNTER — APPOINTMENT (OUTPATIENT)
Dept: CT IMAGING | Facility: HOSPITAL | Age: 64
DRG: 287 | End: 2019-01-23
Attending: FAMILY MEDICINE
Payer: COMMERCIAL

## 2019-01-23 LAB
ANION GAP SERPL CALC-SCNC: 6 MMOL/L (ref 0–18)
BUN BLD-MCNC: 24 MG/DL (ref 8–20)
BUN/CREAT SERPL: 22 (ref 10–20)
CALCIUM BLD-MCNC: 8.8 MG/DL (ref 8.3–10.3)
CHLORIDE SERPL-SCNC: 103 MMOL/L (ref 101–111)
CO2 SERPL-SCNC: 27 MMOL/L (ref 22–32)
CREAT BLD-MCNC: 1.09 MG/DL (ref 0.55–1.02)
GLUCOSE BLD-MCNC: 94 MG/DL (ref 70–99)
HGB BLD-MCNC: 10.5 G/DL (ref 12–16)
OSMOLALITY SERPL CALC.SUM OF ELEC: 286 MOSM/KG (ref 275–295)
POTASSIUM SERPL-SCNC: 4.6 MMOL/L (ref 3.6–5.1)
SODIUM SERPL-SCNC: 136 MMOL/L (ref 136–144)
TROPONIN I SERPL-MCNC: 0.32 NG/ML (ref ?–0.05)

## 2019-01-23 PROCEDURE — 94640 AIRWAY INHALATION TREATMENT: CPT

## 2019-01-23 PROCEDURE — 94664 DEMO&/EVAL PT USE INHALER: CPT

## 2019-01-23 PROCEDURE — 85018 HEMOGLOBIN: CPT | Performed by: FAMILY MEDICINE

## 2019-01-23 PROCEDURE — 84484 ASSAY OF TROPONIN QUANT: CPT | Performed by: INTERNAL MEDICINE

## 2019-01-23 PROCEDURE — 80048 BASIC METABOLIC PNL TOTAL CA: CPT | Performed by: FAMILY MEDICINE

## 2019-01-23 PROCEDURE — 71275 CT ANGIOGRAPHY CHEST: CPT | Performed by: FAMILY MEDICINE

## 2019-01-23 RX ORDER — METHYLPREDNISOLONE SODIUM SUCCINATE 125 MG/2ML
125 INJECTION, POWDER, LYOPHILIZED, FOR SOLUTION INTRAMUSCULAR; INTRAVENOUS EVERY 8 HOURS
Status: COMPLETED | OUTPATIENT
Start: 2019-01-23 | End: 2019-01-23

## 2019-01-23 RX ORDER — PREDNISONE 20 MG/1
20 TABLET ORAL 2 TIMES DAILY WITH MEALS
Status: DISCONTINUED | OUTPATIENT
Start: 2019-01-24 | End: 2019-01-24

## 2019-01-23 RX ORDER — AZITHROMYCIN 250 MG/1
500 TABLET, FILM COATED ORAL ONCE
Status: COMPLETED | OUTPATIENT
Start: 2019-01-23 | End: 2019-01-23

## 2019-01-23 NOTE — PAYOR COMM NOTE
REF: [id number]   APPROVED 1/20/19-1/22/19 REQUESTING ADDITIONAL DAYS      1/22/19  Subjective:     pER RN  DESAT TO 80% WHILE ASLEEP.     Pt. suffered no awakening.     Cough ~ same.  No SOB with lo matt pnc O2.     Had run of SVT on tele w/o sx.        Obj

## 2019-01-23 NOTE — PLAN OF CARE
PLAN OF CARE - SHIFT NOTE      Patient is ANOx4, on RA during the day, now requiring 2L NC @ [facility] (desat mid [de-identified] with good pleth reading on RA), tele NSR, cont B/B, UAL. On droplet precautions for +coronavirus. Maintained.  Patient had heart cath on 1/21 - R patient at this time, resting in bed comfortably. Patient states she spoke with her daughter on her cell phone earlier this morning, updating her on tests/status. No questions at this time, updated patient with current POC. Will continue to monitor.     Roberto No

## 2019-01-24 VITALS
HEART RATE: 73 BPM | DIASTOLIC BLOOD PRESSURE: 58 MMHG | TEMPERATURE: 98 F | SYSTOLIC BLOOD PRESSURE: 108 MMHG | BODY MASS INDEX: 27.04 KG/M2 | OXYGEN SATURATION: 95 % | RESPIRATION RATE: 18 BRPM | HEIGHT: 67 IN | WEIGHT: 172.31 LBS

## 2019-01-24 PROCEDURE — 94640 AIRWAY INHALATION TREATMENT: CPT

## 2019-01-24 RX ORDER — PREDNISONE 20 MG/1
20 TABLET ORAL 2 TIMES DAILY WITH MEALS
Qty: 10 TABLET | Refills: 0 | Status: SHIPPED | OUTPATIENT
Start: 2019-01-24 | End: 2019-02-14 | Stop reason: ALTCHOICE

## 2019-01-24 RX ORDER — ASPIRIN 81 MG/1
81 TABLET, CHEWABLE ORAL DAILY
Qty: 90 TABLET | Refills: 0 | Status: SHIPPED | OUTPATIENT
Start: 2019-01-25

## 2019-01-24 RX ORDER — NEBULIZER ACCESSORIES
EACH MISCELLANEOUS
Qty: 1 EACH | Refills: 0 | Status: SHIPPED | OUTPATIENT
Start: 2019-01-24 | End: 2020-01-18 | Stop reason: CLARIF

## 2019-01-24 RX ORDER — MONTELUKAST SODIUM 10 MG/1
10 TABLET ORAL NIGHTLY
Qty: 30 TABLET | Refills: 0 | Status: SHIPPED | OUTPATIENT
Start: 2019-01-24 | End: 2019-02-14 | Stop reason: CLARIF

## 2019-01-24 NOTE — PAYOR COMM NOTE
REF: [id number]   APPROVED 1/20/19-1/22/19 REQUESTING ADDITIONAL DAYS        1/22/19  Subjective:     pER RN  DESAT TO 80% WHILE ASLEEP.     Pt.  suffered no awakening.     Cough ~ same. No SOB with lo matt pnc O2.     Had run of SVT on tele w/o sx.        O pleurisy.     Had more desat. , needed O2.     Some LE pains early this AM, resolved with stretching out.     Objective:  Temp:  [97.6 °F (36.4 °C)-98.9 °F (37.2 °C)] 97.6 °F (36.4 °C)  Pulse:  [65-87] 86  Resp:  [16-20] 17  BP: ()/(55-66) 110/58    baseline.     PAF (paroxysmal atrial fibrillation) (HCC)= no recurrence this admit.           Disposition: full code.     VTE/GI prophyaxis: IV heparin/pepcid     Discharge Plans: home when pulmo.  disease better controlled; anticipate AM.

## 2019-01-24 NOTE — PROGRESS NOTES
[facility]  Progress Note    Shima Soto Patient Status:  Inpatient    1955 MRN [id number]   [facility] 8NE-A Attending Emir Angulo MD   Hosp Day # 3 PCP Isabelle Sanchez MD     Subjective:    Nii Monroy much better: leew cough 1/2018 A1c-5.6%. Glu so far OK. Hyperlipidemia with target LDL less than 100= lipitior. GERD (gastroesophageal reflux disease)= pepcid, controlld. Hypothyroid= TSH OK     Anemia, iron deficiency= about baseline.     PAF (paroxysmal atrial fibrill

## 2019-01-24 NOTE — PROGRESS NOTES
[facility] [address] [facility] Cardiology Progress Note        Chito Large Patient Status:  Inpatient    1955 MRN [id number]   [facility] 8NE-A Attending Vera Lincoln, DO   Hosp Day # 4 PCP Marcel Blair MD     Subjective:  Kareneta Rank focal deficits. Neck: No JVD, carotids 2+ no bruits. Cardiac: Regular S1S2. No S3, S4, rub, click. No murmur. Lungs: Clear to auscultation and percussion. Abdomen: Soft, non-tender. Extremities: No LE edema. No clubbing or cyanosis.     Neurologic: significant valvular abnormalities. Cardiac Cath:, 1/21/19:      Findings     LVEF: 35%     LM: NORMAL     LCx: NORMAL     LAD: NORMAL     DIAG; + SPONTANEOUS CORONARY ARTERY DISSECTION     RCA: NORMAL                   Impression:      1.  Chest cruzito

## 2019-01-29 NOTE — PAYOR COMM NOTE
--------------  REVIEWER COMMENTS  Please forward final inpatient approved days to include dc date 1/24/19 as full length of stay met guidelines +MI with need for medication changes      DISCHARGE REVIEW    Payor: [facility]  Subscriber #:  [id number]  Aut left-sided chest pain this morning after having her breakfast and taking her medications. She describes having heaviness discomfort radiating down to left elbow. Associated with shortness of breath, increased nausea, weakness, and left UE numbness.   No di [] within 14 days [] other   -See specialist(s) as per their instructions  -Labs: as ordered on out-pt. chart.   -Radiology: as above.      Day of discharge Exam    01/22/19  0842   BP:    Pulse:    Resp: 16   Temp: 98.6 °F (37 °C)       Exam on day of disc tablet (88 mcg total) by mouth once daily. Sertraline HCl 50 MG Tabs  Commonly known as:  ZOLOFT     simvastatin 40 MG Tabs  Commonly known as:  ZOCOR  Take 1 tablet (40 mg total) by mouth nightly.      TraMADol HCl 50 MG Tabs  Commonly known as:  ULTRA Care: as directed by Cardio.   Code Status: Full Code  O2: na      Total Time Coordinating Care: Greater than 30 minutes    Patient correctly states understanding of the above as well as agreement with plan and expresses satisfaction that all questions and

## 2019-02-15 ENCOUNTER — HOSPITAL ENCOUNTER (EMERGENCY)
Facility: HOSPITAL | Age: 64
Discharge: HOME | End: 2019-02-16
Payer: COMMERCIAL

## 2019-02-15 ENCOUNTER — APPOINTMENT (OUTPATIENT)
Dept: ULTRASOUND IMAGING | Facility: HOSPITAL | Age: 64
End: 2019-02-15
Payer: COMMERCIAL

## 2019-02-15 VITALS
TEMPERATURE: 97 F | HEIGHT: 67 IN | BODY MASS INDEX: 27.15 KG/M2 | WEIGHT: 173 LBS | SYSTOLIC BLOOD PRESSURE: 137 MMHG | DIASTOLIC BLOOD PRESSURE: 88 MMHG | HEART RATE: 81 BPM | RESPIRATION RATE: 16 BRPM | OXYGEN SATURATION: 96 %

## 2019-02-15 DIAGNOSIS — I80.02 THROMBOPHLEBITIS OF SUPERFICIAL VEINS OF LEFT LOWER EXTREMITY: Primary | ICD-10-CM

## 2019-02-15 PROCEDURE — 99284 EMERGENCY DEPT VISIT MOD MDM: CPT

## 2019-02-15 PROCEDURE — 93971 EXTREMITY STUDY: CPT

## 2019-02-16 NOTE — ED PROVIDER NOTES
Patient Seen in: [facility] Emergency Department    History   Patient presents with:  Lower Extremity Injury (musculoskeletal)    Stated Complaint: LLE swelling    HPI    Pleasant 59year-old with 2-3 days worth of left calf pain, slight swelling, n SURGERY   • OTHER SURGICAL HISTORY      R SHOULDER RCR   • OTHER SURGICAL HISTORY      R SHOULDER revision RCR   • OTHER SURGICAL HISTORY      B LARGE TOENAIL REMOVAL DUE TO SEVERE ONYCHOMYCOSIS   • OTHER SURGICAL HISTORY  7/2011    R  KNEE  ARTHROSCOPY without lesions or rash.      Neurologic: Awake alert and oriented x 3 with clear speech           ED Course   Labs Reviewed - No data to display       Ultrasound is performed of the left leg        Ultrasound venous Doppler left lower extremity  IMPRESSION

## 2019-04-11 ENCOUNTER — ANESTHESIA EVENT (OUTPATIENT)
Dept: ENDOSCOPY | Facility: HOSPITAL | Age: 64
End: 2019-04-11

## 2019-04-12 ENCOUNTER — HOSPITAL ENCOUNTER (OUTPATIENT)
Facility: HOSPITAL | Age: 64
Setting detail: HOSPITAL OUTPATIENT SURGERY
Discharge: HOME | End: 2019-04-12
Attending: INTERNAL MEDICINE | Admitting: INTERNAL MEDICINE
Payer: COMMERCIAL

## 2019-04-12 ENCOUNTER — ANESTHESIA (OUTPATIENT)
Dept: ENDOSCOPY | Facility: HOSPITAL | Age: 64
End: 2019-04-12

## 2019-04-12 VITALS
OXYGEN SATURATION: 96 % | HEIGHT: 67 IN | TEMPERATURE: 99 F | RESPIRATION RATE: 18 BRPM | DIASTOLIC BLOOD PRESSURE: 70 MMHG | HEART RATE: 63 BPM | SYSTOLIC BLOOD PRESSURE: 124 MMHG | WEIGHT: 168 LBS | BODY MASS INDEX: 26.37 KG/M2

## 2019-04-12 DIAGNOSIS — R10.13 DYSPEPSIA: ICD-10-CM

## 2019-04-12 DIAGNOSIS — Z12.11 SCREENING FOR COLON CANCER: ICD-10-CM

## 2019-04-12 DIAGNOSIS — F45.8 GLOBUS HYSTERICUS: ICD-10-CM

## 2019-04-12 PROCEDURE — 0DJD8ZZ INSPECTION OF LOWER INTESTINAL TRACT, VIA NATURAL OR ARTIFICIAL OPENING ENDOSCOPIC: ICD-10-PCS | Performed by: INTERNAL MEDICINE

## 2019-04-12 PROCEDURE — 0DJ08ZZ INSPECTION OF UPPER INTESTINAL TRACT, VIA NATURAL OR ARTIFICIAL OPENING ENDOSCOPIC: ICD-10-PCS | Performed by: INTERNAL MEDICINE

## 2019-04-12 RX ORDER — SODIUM CHLORIDE, SODIUM LACTATE, POTASSIUM CHLORIDE, CALCIUM CHLORIDE 600; 310; 30; 20 MG/100ML; MG/100ML; MG/100ML; MG/100ML
INJECTION, SOLUTION INTRAVENOUS CONTINUOUS
Status: DISCONTINUED | OUTPATIENT
Start: 2019-04-12 | End: 2019-04-12

## 2019-04-12 RX ORDER — NALOXONE HYDROCHLORIDE 0.4 MG/ML
80 INJECTION, SOLUTION INTRAMUSCULAR; INTRAVENOUS; SUBCUTANEOUS AS NEEDED
Status: DISCONTINUED | OUTPATIENT
Start: 2019-04-12 | End: 2019-04-12

## 2019-04-12 RX ORDER — ONDANSETRON 2 MG/ML
4 INJECTION INTRAMUSCULAR; INTRAVENOUS AS NEEDED
Status: DISCONTINUED | OUTPATIENT
Start: 2019-04-12 | End: 2019-04-12

## 2019-04-12 RX ORDER — DIPHENHYDRAMINE HYDROCHLORIDE 50 MG/ML
12.5 INJECTION INTRAMUSCULAR; INTRAVENOUS AS NEEDED
Status: DISCONTINUED | OUTPATIENT
Start: 2019-04-12 | End: 2019-04-12

## 2019-04-12 NOTE — H&P
[facility] GASTROENTEROLOGY    REFERRING PHYSICIAN: Dr. Shelli Mclaughlin is a 59year old female.   GERD globus CRC screening    See note reviewed from 3/13/19    PROCEDURE: EGD colonoscopy    Allergies: Triptans    No current outpa Social History    Tobacco Use      Smoking status: Never Smoker      Smokeless tobacco: Never Used    Alcohol use:  Yes      Alcohol/week: 0.0 - 1.2 oz      Frequency: Monthly or less      Drinks per session: 1 or 2      Binge frequency: Never      Comm

## 2019-04-12 NOTE — H&P
[address] [place] [address] Patient Status:  Hospital Outpatient Surgery    1955 MRN [id number]   [facility] ENDOSCOPY Attending Tung Pike MD   Hosp Day # 0 PCP Yadi Arita MD     [address] [address] ONYCHOMYCOSIS   • OTHER SURGICAL HISTORY  7/2011    R  KNEE  ARTHROSCOPY   • TENDON REPAIR WRIST, HAND, FINGER Left 7/18/2018    Performed by Conrad Rodriguez MD at [address] [place] [address]   • TONSILLECTOMY      T&A     Social History    Tobacco Use      S

## 2019-04-12 NOTE — OPERATIVE REPORT
Kareem Parikh PATIENT NAME: Rachel Mention  DATE OF OPERATION: 4/12/2019    PREOPERATIVE DIAGNOSIS:  1. GERD  2. Globus  3. CRC surveillance  POSTOPERATIVE DIAGNOSIS:  1. Hiatal hernia. 2. Normal esophagus, no source for globus seen  3.  Diverticulosis    PROCEDUR loss.There were no immediate apparent complications. MD Anesthesia was present to assist in monitoring the patient during the entire length of the moderate sedation time. RECOMMENDATIONS   1. Continue current med therapy.   2. Consume a high fiber diet

## 2019-04-12 NOTE — ANESTHESIA PREPROCEDURE EVALUATION
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  Disp: 3 Inhaler Rfl: 1       Allergies: Triptans      Anesthesia Evaluation        Anesthetic Complications  (-) history of anesthetic complications         GI/Hepatic/Renal      (+) GERD and well controlled Conrad Rodriguez MD at [facility]   • TONSILLECTOMY      T&A     Social History    Tobacco Use      Smoking status: Never Smoker      Smokeless tobacco: Never Used    Alcohol use:  Yes      Alcohol/week: 0.0 - 1.2 oz      Frequency: Monthly or

## 2019-04-13 ENCOUNTER — HOSPITAL ENCOUNTER (EMERGENCY)
Facility: HOSPITAL | Age: 64
Discharge: HOME | End: 2019-04-13
Attending: EMERGENCY MEDICINE
Payer: COMMERCIAL

## 2019-04-13 ENCOUNTER — APPOINTMENT (OUTPATIENT)
Dept: CT IMAGING | Facility: HOSPITAL | Age: 64
End: 2019-04-13
Attending: EMERGENCY MEDICINE
Payer: COMMERCIAL

## 2019-04-13 VITALS
SYSTOLIC BLOOD PRESSURE: 138 MMHG | BODY MASS INDEX: 26.37 KG/M2 | WEIGHT: 168 LBS | TEMPERATURE: 99 F | HEIGHT: 67 IN | HEART RATE: 66 BPM | DIASTOLIC BLOOD PRESSURE: 81 MMHG | OXYGEN SATURATION: 94 % | RESPIRATION RATE: 18 BRPM

## 2019-04-13 DIAGNOSIS — R10.9 ABDOMINAL PAIN OF UNKNOWN ETIOLOGY: Primary | ICD-10-CM

## 2019-04-13 PROCEDURE — 84484 ASSAY OF TROPONIN QUANT: CPT | Performed by: EMERGENCY MEDICINE

## 2019-04-13 PROCEDURE — 96361 HYDRATE IV INFUSION ADD-ON: CPT

## 2019-04-13 PROCEDURE — 99285 EMERGENCY DEPT VISIT HI MDM: CPT

## 2019-04-13 PROCEDURE — 96374 THER/PROPH/DIAG INJ IV PUSH: CPT

## 2019-04-13 PROCEDURE — 83690 ASSAY OF LIPASE: CPT | Performed by: EMERGENCY MEDICINE

## 2019-04-13 PROCEDURE — 80053 COMPREHEN METABOLIC PANEL: CPT | Performed by: EMERGENCY MEDICINE

## 2019-04-13 PROCEDURE — 74177 CT ABD & PELVIS W/CONTRAST: CPT | Performed by: EMERGENCY MEDICINE

## 2019-04-13 PROCEDURE — 85025 COMPLETE CBC W/AUTO DIFF WBC: CPT | Performed by: EMERGENCY MEDICINE

## 2019-04-13 PROCEDURE — 93005 ELECTROCARDIOGRAM TRACING: CPT

## 2019-04-13 PROCEDURE — 81003 URINALYSIS AUTO W/O SCOPE: CPT | Performed by: EMERGENCY MEDICINE

## 2019-04-13 PROCEDURE — 93010 ELECTROCARDIOGRAM REPORT: CPT

## 2019-04-13 RX ORDER — HYDROCODONE BITARTRATE AND ACETAMINOPHEN 5; 325 MG/1; MG/1
1 TABLET ORAL EVERY 6 HOURS PRN
Qty: 10 TABLET | Refills: 0 | Status: SHIPPED | OUTPATIENT
Start: 2019-04-13 | End: 2019-04-20

## 2019-04-13 RX ORDER — SODIUM CHLORIDE 9 MG/ML
125 INJECTION, SOLUTION INTRAVENOUS CONTINUOUS
Status: DISCONTINUED | OUTPATIENT
Start: 2019-04-13 | End: 2019-04-13

## 2019-04-13 RX ORDER — HYDROMORPHONE HYDROCHLORIDE 1 MG/ML
0.5 INJECTION, SOLUTION INTRAMUSCULAR; INTRAVENOUS; SUBCUTANEOUS EVERY 30 MIN PRN
Status: DISCONTINUED | OUTPATIENT
Start: 2019-04-13 | End: 2019-04-13

## 2019-04-13 RX ORDER — MAGNESIUM HYDROXIDE/ALUMINUM HYDROXICE/SIMETHICONE 120; 1200; 1200 MG/30ML; MG/30ML; MG/30ML
30 SUSPENSION ORAL ONCE
Status: COMPLETED | OUTPATIENT
Start: 2019-04-13 | End: 2019-04-13

## 2019-04-13 NOTE — ED INITIAL ASSESSMENT (HPI)
58 yo F complaining of abdomen pain (epigastric) that radiates to mid chest and the throat. Pt had colonoscopy and EGD done yesterday. Denies SOB. Denies fever or chills. Denies cough.

## 2019-04-14 NOTE — ED PROVIDER NOTES
Patient Seen in: [facility] Emergency Department    History   Patient presents with:  Chest Pain Angina (cardiovascular)    Stated Complaint: Chest pain    HPI    Patient is a 21-year-old female who states that  she has had some issues with swallowi • Prediabetes    • Problems with swallowing     feels like something stuck in throat at all times   • Unspecified essential hypertension    • Vitamin D deficiency 1/13/2013       Past Surgical History:   Procedure Laterality Date   • ANGIOGRAM  7/31/11 Physical Exam  GENERAL: Patient resting comfortably on the cart in no acute distress. HEENT: Extraocular muscles intact, pupils equal round reactive to light and accommodation. Mouth normal, neck supple, no meningismus.   LUNGS: Lungs clear to [employer] Infiltration of mesenteric fat with mesenteric lymphadenopathy is noted.  This may be sequelae of mesenteric adenitis.  This can also be seen with enteritis.     2. Tiny focal density in the gallbladder may represent a tiny gallstone in the gallbladder fun

## 2019-04-18 NOTE — ANESTHESIA POSTPROCEDURE EVALUATION
[facility]    Vrainder Chatterjee Patient Status:  Hospital Outpatient Surgery   Age/Gender 59year old female MRN [id number]   Location [address] [facility]. Attending No att. providers found   Hosp Day # 0 PCP Salomón Nuñez MD       Anesthesia Post

## 2019-08-02 RX ORDER — PHENAZOPYRIDINE HYDROCHLORIDE 200 MG/1
200 TABLET, FILM COATED ORAL ONCE
Status: CANCELLED | OUTPATIENT
Start: 2019-08-02 | End: 2019-08-02

## 2019-08-02 NOTE — PAT NURSING NOTE
Pt does not have a PCP appointment yet.  I informed her of the need for medical clearance pre-op, and requested her to call her PCP ASAP to make an appointment

## 2019-08-12 ENCOUNTER — LABORATORY ENCOUNTER (OUTPATIENT)
Dept: LAB | Facility: HOSPITAL | Age: 64
End: 2019-08-12
Payer: COMMERCIAL

## 2019-08-12 ENCOUNTER — APPOINTMENT (OUTPATIENT)
Dept: LAB | Facility: HOSPITAL | Age: 64
End: 2019-08-12
Payer: COMMERCIAL

## 2019-08-12 DIAGNOSIS — N81.4 UTEROVAGINAL PROLAPSE: ICD-10-CM

## 2019-08-12 LAB
ANION GAP SERPL CALC-SCNC: 5 MMOL/L (ref 0–18)
ANTIBODY SCREEN: NEGATIVE
APTT PPP: 31.9 SECONDS (ref 25.4–36.1)
ATRIAL RATE: 60 BPM
BASOPHILS # BLD AUTO: 0.06 X10(3) UL (ref 0–0.2)
BASOPHILS NFR BLD AUTO: 0.9 %
BUN BLD-MCNC: 12 MG/DL (ref 7–18)
BUN/CREAT SERPL: 12.2 (ref 10–20)
CALCIUM BLD-MCNC: 9.2 MG/DL (ref 8.5–10.1)
CHLORIDE SERPL-SCNC: 104 MMOL/L (ref 98–112)
CO2 SERPL-SCNC: 28 MMOL/L (ref 21–32)
CREAT BLD-MCNC: 0.98 MG/DL (ref 0.55–1.02)
DEPRECATED RDW RBC AUTO: 43.9 FL (ref 35.1–46.3)
EOSINOPHIL # BLD AUTO: 0.61 X10(3) UL (ref 0–0.7)
EOSINOPHIL NFR BLD AUTO: 9.4 %
ERYTHROCYTE [DISTWIDTH] IN BLOOD BY AUTOMATED COUNT: 15.7 % (ref 11–15)
GLUCOSE BLD-MCNC: 86 MG/DL (ref 70–99)
HCT VFR BLD AUTO: 34.9 % (ref 35–48)
HGB BLD-MCNC: 10.6 G/DL (ref 12–16)
IMM GRANULOCYTES # BLD AUTO: 0.02 X10(3) UL (ref 0–1)
IMM GRANULOCYTES NFR BLD: 0.3 %
INR BLD: 1.01 (ref 0.9–1.1)
LYMPHOCYTES # BLD AUTO: 1.42 X10(3) UL (ref 1–4)
LYMPHOCYTES NFR BLD AUTO: 21.8 %
MCH RBC QN AUTO: 23.6 PG (ref 26–34)
MCHC RBC AUTO-ENTMCNC: 30.4 G/DL (ref 31–37)
MCV RBC AUTO: 77.6 FL (ref 80–100)
MONOCYTES # BLD AUTO: 0.8 X10(3) UL (ref 0.1–1)
MONOCYTES NFR BLD AUTO: 12.3 %
NEUTROPHILS # BLD AUTO: 3.61 X10 (3) UL (ref 1.5–7.7)
NEUTROPHILS # BLD AUTO: 3.61 X10(3) UL (ref 1.5–7.7)
NEUTROPHILS NFR BLD AUTO: 55.3 %
OSMOLALITY SERPL CALC.SUM OF ELEC: 283 MOSM/KG (ref 275–295)
P AXIS: 29 DEGREES
P-R INTERVAL: 142 MS
PLATELET # BLD AUTO: 296 10(3)UL (ref 150–450)
POTASSIUM SERPL-SCNC: 4.5 MMOL/L (ref 3.5–5.1)
PSA SERPL DL<=0.01 NG/ML-MCNC: 13.7 SECONDS (ref 12.5–14.7)
Q-T INTERVAL: 422 MS
QRS DURATION: 92 MS
QTC CALCULATION (BEZET): 422 MS
R AXIS: 61 DEGREES
RBC # BLD AUTO: 4.5 X10(6)UL (ref 3.8–5.3)
RH BLOOD TYPE: POSITIVE
SODIUM SERPL-SCNC: 137 MMOL/L (ref 136–145)
T AXIS: 45 DEGREES
VENTRICULAR RATE: 60 BPM
WBC # BLD AUTO: 6.5 X10(3) UL (ref 4–11)

## 2019-08-12 PROCEDURE — 85730 THROMBOPLASTIN TIME PARTIAL: CPT

## 2019-08-12 PROCEDURE — 85025 COMPLETE CBC W/AUTO DIFF WBC: CPT

## 2019-08-12 PROCEDURE — 36415 COLL VENOUS BLD VENIPUNCTURE: CPT

## 2019-08-12 PROCEDURE — 86900 BLOOD TYPING SEROLOGIC ABO: CPT

## 2019-08-12 PROCEDURE — 93010 ELECTROCARDIOGRAM REPORT: CPT | Performed by: INTERNAL MEDICINE

## 2019-08-12 PROCEDURE — 93005 ELECTROCARDIOGRAM TRACING: CPT

## 2019-08-12 PROCEDURE — 85610 PROTHROMBIN TIME: CPT

## 2019-08-12 PROCEDURE — 86850 RBC ANTIBODY SCREEN: CPT

## 2019-08-12 PROCEDURE — 80048 BASIC METABOLIC PNL TOTAL CA: CPT

## 2019-08-12 PROCEDURE — 86901 BLOOD TYPING SEROLOGIC RH(D): CPT

## 2019-08-13 PROBLEM — Z67.10 BLOOD TYPE A+: Status: ACTIVE | Noted: 2019-08-13

## 2019-08-13 NOTE — H&P (VIEW-ONLY)
[address],  -                                                                 8/13/2019 Respiratory Therapy Supplies (NEBULIZER AIR TUBE/PLUGS) Does not apply Misc qid to use atrovent neb Disp: 1 each Rfl: 0   aspirin 81 MG Oral Chew Tab Chew 1 tablet (81 mg total) by mouth daily.  Disp: 90 tablet Rfl: 0   Respiratory Therapy Supplies Does not • Unspecified essential hypertension    • Vitamin D deficiency 1/13/2013      Past Surgical History:   Procedure Laterality Date   • ANGIOGRAM  7/31/11    OM spasm   • APPENDECTOMY     • APPENDECTOMY     • COLONOSCOPY     • COLONOSCOPY  4/12/19= Diverticul Binge frequency: Never      Comment: social    Drug use: No      Comment: NEVER.         REVIEW OF SYSTEMS:  GENERAL: feels well otherwise  SKIN: denies any unusual skin lesions  EYES: denies blurred vision or double vision  HEENT: denies nasal congesti Patient is to have total vaginal hysterectomy, bilateral salpingectomy, uterosacral ligament fixation, anterior colporrhaphy, possible posterior colporrhaphy repair, transvaginal mid-urethral sling, cytoscopy, to be done by Dr. Cas Jaquez and [facility]

## 2019-08-19 NOTE — H&P (VIEW-ONLY)
SIGNIFICANT HISTORY:     Medical History: ·   Past Medical History:   Diagnosis Date   • AF (atrial fibrillation) RESOLVED SPONTANEOUSLY 11/18/2008   • Anemia, iron deficiency 12/18/2010   • Blood disorder     borderline hemopheliac-    • BPPV (benign paro REPAIR WRIST, HAND, FINGER Left 2018    Performed by Edmundo Hopper MD at [address] [place] [address] Drive      T&A   • UPPER GI ENDOSCOPY PERFORMED  19= Hiatal hernia        Obstetric History: ·   OB History    Para Term Preter SODIUM 10 MG Oral Tab TAKE 1 TABLET BY MOUTH EVERY DAY AT NIGHT Disp: 90 tablet Rfl: 0   SERTRALINE HCL 50 MG Oral Tab TAKE 2.5 TABLETS (125 MG TOTAL) BY MOUTH DAILY.  Disp: 90 tablet Rfl: 0   simvastatin 40 MG Oral Tab Take 1 tablet (40 mg total) by mouth HEALTH: feels well, denies fever/chills, lightheadedness/dizziness  SKIN: denies any unusual skin lesions or rashes  HEENT: denies nasal congestion, sinus pain or sore throat; hearing loss negative  RESPIRATORY: denies shortness of breath, wheezing or coug bleeding, infection, injury to nearby organs, need for additional unanticipated surgery. Dr. Karey Lee to perform prolapse repair.       QI#[id number]

## 2019-08-20 ENCOUNTER — ANESTHESIA EVENT (OUTPATIENT)
Dept: SURGERY | Facility: HOSPITAL | Age: 64
End: 2019-08-20
Payer: COMMERCIAL

## 2019-08-26 ENCOUNTER — ANESTHESIA (OUTPATIENT)
Dept: SURGERY | Facility: HOSPITAL | Age: 64
End: 2019-08-26
Payer: COMMERCIAL

## 2019-08-26 ENCOUNTER — HOSPITAL ENCOUNTER (OUTPATIENT)
Facility: HOSPITAL | Age: 64
Discharge: HOME | End: 2019-08-28
Attending: OBSTETRICS & GYNECOLOGY | Admitting: OBSTETRICS & GYNECOLOGY
Payer: COMMERCIAL

## 2019-08-26 DIAGNOSIS — N81.4 UTEROVAGINAL PROLAPSE: Primary | ICD-10-CM

## 2019-08-26 LAB
ANTIBODY SCREEN: NEGATIVE
RH BLOOD TYPE: POSITIVE

## 2019-08-26 PROCEDURE — 86901 BLOOD TYPING SEROLOGIC RH(D): CPT | Performed by: OBSTETRICS & GYNECOLOGY

## 2019-08-26 PROCEDURE — 86900 BLOOD TYPING SEROLOGIC ABO: CPT | Performed by: OBSTETRICS & GYNECOLOGY

## 2019-08-26 PROCEDURE — 0USG7ZZ REPOSITION VAGINA, VIA NATURAL OR ARTIFICIAL OPENING: ICD-10-PCS | Performed by: UROLOGY

## 2019-08-26 PROCEDURE — 0UT77ZZ RESECTION OF BILATERAL FALLOPIAN TUBES, VIA NATURAL OR ARTIFICIAL OPENING: ICD-10-PCS | Performed by: OBSTETRICS & GYNECOLOGY

## 2019-08-26 PROCEDURE — 0TSD0ZZ REPOSITION URETHRA, OPEN APPROACH: ICD-10-PCS | Performed by: UROLOGY

## 2019-08-26 PROCEDURE — 0UT97ZZ RESECTION OF UTERUS, VIA NATURAL OR ARTIFICIAL OPENING: ICD-10-PCS | Performed by: OBSTETRICS & GYNECOLOGY

## 2019-08-26 PROCEDURE — 0JQC0ZZ REPAIR PELVIC REGION SUBCUTANEOUS TISSUE AND FASCIA, OPEN APPROACH: ICD-10-PCS | Performed by: UROLOGY

## 2019-08-26 PROCEDURE — 86850 RBC ANTIBODY SCREEN: CPT | Performed by: OBSTETRICS & GYNECOLOGY

## 2019-08-26 PROCEDURE — 88305 TISSUE EXAM BY PATHOLOGIST: CPT | Performed by: OBSTETRICS & GYNECOLOGY

## 2019-08-26 DEVICE — SIS SYSTEM
Type: IMPLANTABLE DEVICE | Site: PELVIS | Status: FUNCTIONAL
Brand: SOLYX™ SIS SYSTEM

## 2019-08-26 RX ORDER — METOPROLOL SUCCINATE 25 MG/1
25 TABLET, EXTENDED RELEASE ORAL
Status: DISCONTINUED | OUTPATIENT
Start: 2019-08-27 | End: 2019-08-28

## 2019-08-26 RX ORDER — ENOXAPARIN SODIUM 100 MG/ML
40 INJECTION SUBCUTANEOUS NIGHTLY
Status: DISCONTINUED | OUTPATIENT
Start: 2019-08-26 | End: 2019-08-28

## 2019-08-26 RX ORDER — MONTELUKAST SODIUM 10 MG/1
10 TABLET ORAL NIGHTLY
Status: DISCONTINUED | OUTPATIENT
Start: 2019-08-26 | End: 2019-08-28

## 2019-08-26 RX ORDER — ONDANSETRON 2 MG/ML
4 INJECTION INTRAMUSCULAR; INTRAVENOUS ONCE AS NEEDED
Status: DISCONTINUED | OUTPATIENT
Start: 2019-08-26 | End: 2019-08-26 | Stop reason: HOSPADM

## 2019-08-26 RX ORDER — DEXTROSE MONOHYDRATE 25 G/50ML
INJECTION, SOLUTION INTRAVENOUS AS NEEDED
Status: DISCONTINUED | OUTPATIENT
Start: 2019-08-26 | End: 2019-08-26 | Stop reason: HOSPADM

## 2019-08-26 RX ORDER — TRAMADOL HYDROCHLORIDE 50 MG/1
50 TABLET ORAL EVERY 6 HOURS PRN
Status: DISCONTINUED | OUTPATIENT
Start: 2019-08-26 | End: 2019-08-28

## 2019-08-26 RX ORDER — ALBUTEROL SULFATE 90 UG/1
2 AEROSOL, METERED RESPIRATORY (INHALATION) EVERY 6 HOURS PRN
Status: DISCONTINUED | OUTPATIENT
Start: 2019-08-26 | End: 2019-08-28

## 2019-08-26 RX ORDER — POLYETHYLENE GLYCOL 3350 17 G/17G
17 POWDER, FOR SOLUTION ORAL DAILY PRN
Status: DISCONTINUED | OUTPATIENT
Start: 2019-08-26 | End: 2019-08-28

## 2019-08-26 RX ORDER — BUPIVACAINE HYDROCHLORIDE AND EPINEPHRINE 2.5; 5 MG/ML; UG/ML
INJECTION, SOLUTION EPIDURAL; INFILTRATION; INTRACAUDAL; PERINEURAL AS NEEDED
Status: DISCONTINUED | OUTPATIENT
Start: 2019-08-26 | End: 2019-08-26 | Stop reason: HOSPADM

## 2019-08-26 RX ORDER — BUTALBITAL, ACETAMINOPHEN AND CAFFEINE 50; 325; 40 MG/1; MG/1; MG/1
1 TABLET ORAL EVERY 4 HOURS PRN
Status: DISCONTINUED | OUTPATIENT
Start: 2019-08-26 | End: 2019-08-28

## 2019-08-26 RX ORDER — SODIUM CHLORIDE, SODIUM LACTATE, POTASSIUM CHLORIDE, CALCIUM CHLORIDE 600; 310; 30; 20 MG/100ML; MG/100ML; MG/100ML; MG/100ML
INJECTION, SOLUTION INTRAVENOUS CONTINUOUS
Status: DISCONTINUED | OUTPATIENT
Start: 2019-08-26 | End: 2019-08-26 | Stop reason: HOSPADM

## 2019-08-26 RX ORDER — MONTELUKAST SODIUM 10 MG/1
10 TABLET ORAL NIGHTLY
COMMUNITY
End: 2020-12-15

## 2019-08-26 RX ORDER — TRAMADOL HYDROCHLORIDE 50 MG/1
TABLET ORAL EVERY 6 HOURS PRN
Status: DISCONTINUED | OUTPATIENT
Start: 2019-08-26 | End: 2019-08-26 | Stop reason: SDUPTHER

## 2019-08-26 RX ORDER — METOPROLOL SUCCINATE 25 MG/1
25 TABLET, EXTENDED RELEASE ORAL DAILY
COMMUNITY
End: 2019-11-26

## 2019-08-26 RX ORDER — CEFAZOLIN SODIUM/WATER 2 G/20 ML
2 SYRINGE (ML) INTRAVENOUS EVERY 8 HOURS
Status: COMPLETED | OUTPATIENT
Start: 2019-08-26 | End: 2019-08-27

## 2019-08-26 RX ORDER — ONDANSETRON 4 MG/1
4 TABLET, FILM COATED ORAL EVERY 8 HOURS PRN
Status: DISCONTINUED | OUTPATIENT
Start: 2019-08-26 | End: 2019-08-28

## 2019-08-26 RX ORDER — ACETAMINOPHEN 500 MG
1000 TABLET ORAL ONCE
Status: DISCONTINUED | OUTPATIENT
Start: 2019-08-26 | End: 2019-08-26 | Stop reason: HOSPADM

## 2019-08-26 RX ORDER — ONDANSETRON 2 MG/ML
4 INJECTION INTRAMUSCULAR; INTRAVENOUS EVERY 8 HOURS PRN
Status: DISCONTINUED | OUTPATIENT
Start: 2019-08-26 | End: 2019-08-28

## 2019-08-26 RX ORDER — CEFAZOLIN SODIUM/WATER 2 G/20 ML
2 SYRINGE (ML) INTRAVENOUS ONCE
Status: COMPLETED | OUTPATIENT
Start: 2019-08-26 | End: 2019-08-26

## 2019-08-26 RX ORDER — HYDROCODONE BITARTRATE AND ACETAMINOPHEN 5; 325 MG/1; MG/1
2 TABLET ORAL AS NEEDED
Status: DISCONTINUED | OUTPATIENT
Start: 2019-08-26 | End: 2019-08-26 | Stop reason: HOSPADM

## 2019-08-26 RX ORDER — ZOLPIDEM TARTRATE 10 MG/1
5 TABLET ORAL NIGHTLY PRN
Status: DISCONTINUED | OUTPATIENT
Start: 2019-08-26 | End: 2019-08-28

## 2019-08-26 RX ORDER — NALOXONE HYDROCHLORIDE 0.4 MG/ML
80 INJECTION, SOLUTION INTRAMUSCULAR; INTRAVENOUS; SUBCUTANEOUS AS NEEDED
Status: DISCONTINUED | OUTPATIENT
Start: 2019-08-26 | End: 2019-08-26 | Stop reason: HOSPADM

## 2019-08-26 RX ORDER — ATORVASTATIN CALCIUM 20 MG/1
20 TABLET, FILM COATED ORAL NIGHTLY
Status: DISCONTINUED | OUTPATIENT
Start: 2019-08-26 | End: 2019-08-28

## 2019-08-26 RX ORDER — PHENAZOPYRIDINE HYDROCHLORIDE 200 MG/1
200 TABLET, FILM COATED ORAL ONCE
Status: COMPLETED | OUTPATIENT
Start: 2019-08-26 | End: 2019-08-26

## 2019-08-26 RX ORDER — HYDROCODONE BITARTRATE AND ACETAMINOPHEN 5; 325 MG/1; MG/1
1 TABLET ORAL AS NEEDED
Status: DISCONTINUED | OUTPATIENT
Start: 2019-08-26 | End: 2019-08-26 | Stop reason: HOSPADM

## 2019-08-26 RX ORDER — SODIUM CHLORIDE, SODIUM LACTATE, POTASSIUM CHLORIDE, CALCIUM CHLORIDE 600; 310; 30; 20 MG/100ML; MG/100ML; MG/100ML; MG/100ML
INJECTION, SOLUTION INTRAVENOUS CONTINUOUS
Status: DISCONTINUED | OUTPATIENT
Start: 2019-08-26 | End: 2019-08-27

## 2019-08-26 RX ORDER — METOCLOPRAMIDE HYDROCHLORIDE 5 MG/ML
10 INJECTION INTRAMUSCULAR; INTRAVENOUS AS NEEDED
Status: DISCONTINUED | OUTPATIENT
Start: 2019-08-26 | End: 2019-08-26 | Stop reason: HOSPADM

## 2019-08-26 RX ORDER — LEVOTHYROXINE SODIUM 88 UG/1
88 TABLET ORAL
Status: DISCONTINUED | OUTPATIENT
Start: 2019-08-27 | End: 2019-08-28

## 2019-08-26 RX ORDER — MIDAZOLAM HYDROCHLORIDE 1 MG/ML
0.5 INJECTION INTRAMUSCULAR; INTRAVENOUS EVERY 5 MIN PRN
Status: DISCONTINUED | OUTPATIENT
Start: 2019-08-26 | End: 2019-08-26 | Stop reason: HOSPADM

## 2019-08-26 RX ORDER — DOCUSATE SODIUM 100 MG/1
100 CAPSULE, LIQUID FILLED ORAL 2 TIMES DAILY
Status: DISCONTINUED | OUTPATIENT
Start: 2019-08-26 | End: 2019-08-28

## 2019-08-26 RX ORDER — HYDROMORPHONE HYDROCHLORIDE 1 MG/ML
INJECTION, SOLUTION INTRAMUSCULAR; INTRAVENOUS; SUBCUTANEOUS
Status: DISCONTINUED
Start: 2019-08-26 | End: 2019-08-26 | Stop reason: WASHOUT

## 2019-08-26 RX ORDER — DIPHENHYDRAMINE HYDROCHLORIDE 50 MG/ML
12.5 INJECTION INTRAMUSCULAR; INTRAVENOUS EVERY 4 HOURS PRN
Status: DISCONTINUED | OUTPATIENT
Start: 2019-08-26 | End: 2019-08-28

## 2019-08-26 RX ORDER — KETOROLAC TROMETHAMINE 10 MG/1
10 TABLET, FILM COATED ORAL EVERY 6 HOURS PRN
Qty: 16 TABLET | Refills: 3 | Status: SHIPPED | OUTPATIENT
Start: 2019-08-26 | End: 2019-08-30

## 2019-08-26 RX ORDER — TRAMADOL HYDROCHLORIDE 50 MG/1
100 TABLET ORAL EVERY 6 HOURS PRN
Status: DISCONTINUED | OUTPATIENT
Start: 2019-08-26 | End: 2019-08-28

## 2019-08-26 RX ORDER — KETOROLAC TROMETHAMINE 15 MG/ML
15 INJECTION, SOLUTION INTRAMUSCULAR; INTRAVENOUS EVERY 6 HOURS
Status: COMPLETED | OUTPATIENT
Start: 2019-08-26 | End: 2019-08-28

## 2019-08-26 RX ORDER — FAMOTIDINE 20 MG/1
20 TABLET ORAL 2 TIMES DAILY
Status: DISCONTINUED | OUTPATIENT
Start: 2019-08-26 | End: 2019-08-28

## 2019-08-26 RX ORDER — HYDROMORPHONE HYDROCHLORIDE 1 MG/ML
INJECTION, SOLUTION INTRAMUSCULAR; INTRAVENOUS; SUBCUTANEOUS
Status: COMPLETED
Start: 2019-08-26 | End: 2019-08-26

## 2019-08-26 RX ORDER — SODIUM CHLORIDE 9 MG/ML
INJECTION, SOLUTION INTRAVENOUS CONTINUOUS
Status: DISCONTINUED | OUTPATIENT
Start: 2019-08-26 | End: 2019-08-28

## 2019-08-26 RX ORDER — MIDAZOLAM HYDROCHLORIDE 1 MG/ML
INJECTION INTRAMUSCULAR; INTRAVENOUS
Status: COMPLETED
Start: 2019-08-26 | End: 2019-08-26

## 2019-08-26 RX ORDER — VERAPAMIL HYDROCHLORIDE 240 MG/1
240 TABLET, FILM COATED, EXTENDED RELEASE ORAL DAILY
Status: DISCONTINUED | OUTPATIENT
Start: 2019-08-27 | End: 2019-08-28

## 2019-08-26 RX ORDER — HYDROMORPHONE HYDROCHLORIDE 1 MG/ML
0.4 INJECTION, SOLUTION INTRAMUSCULAR; INTRAVENOUS; SUBCUTANEOUS EVERY 5 MIN PRN
Status: DISCONTINUED | OUTPATIENT
Start: 2019-08-26 | End: 2019-08-26 | Stop reason: HOSPADM

## 2019-08-26 NOTE — INTERVAL H&P NOTE
Pre-op Diagnosis: Uterovaginal prolapse [N81.4]    The above referenced H&P was reviewed by Deion Hwang MD on 8/26/2019, the patient was examined and no significant changes have occurred in the patient's condition since the H&P was performed.   I discusse

## 2019-08-26 NOTE — PROGRESS NOTES
Received patient from PACU on 4Lnc. Patient is drowsy but alert to conversation. Complains of nausea. Zofran given. Bed alarm on.

## 2019-08-26 NOTE — ANESTHESIA PREPROCEDURE EVALUATION
PRE-OP EVALUATION    Patient Name: Messi Spencer    Pre-op Diagnosis: Uterovaginal prolapse [N81.4]    Procedure(s):  TOTAL VAGINAL HYSTERECTOMY BILATERAL SALPINGECTOMY (SAYLA)   UTEROSACRAL LIGAMENT FIXATION, ANTERIOR COLPORRHAPHY REPAIR, POSSIBLE POST Disp:  Rfl:    [DISCONTINUED] famoTIDine 20 MG Oral Tab Take one tablet by mouth in the morning and one tablet by mouth in the evening.  Disp: 90 tablet Rfl: 0   [DISCONTINUED] MONTELUKAST SODIUM 10 MG Oral Tab TAKE 1 TABLET BY MOUTH EVERY DAY AT NIGHT Disp pressure (S,     est): 18mm Hg. 3. No significant valvular abnormalities.     Impressions: This study is compared with previous dated 07/21/2015: No  significant change. ECG reviewed.   Exercise tolerance: good     MET: >4    (+) obesity  (+) hypertens session: 1 or 2      Binge frequency: Never      Comment: social      Drug use: No   Comment: NEVER. Available pre-op labs reviewed. Lab Results   Component Value Date    WBC 6.5 08/12/2019    RBC 4.50 08/12/2019    HGB 10.6 (L) 08/12/2019    HCT 34. 9

## 2019-08-26 NOTE — INTERVAL H&P NOTE
Pre-op Diagnosis: Uterovaginal prolapse [N81.4]    The above referenced H&P was reviewed by Laurie Lundy MD on 8/26/2019, the patient was examined and no significant changes have occurred in the patient's condition since the H&P was performed.   I dis

## 2019-08-26 NOTE — OPERATIVE REPORT
Operative Report    [facility]  9/26/1955  [id number]    Date: 8/26/19    Preoperative  Diagnosis: Uterovaginal prolapse, incomplete  Stress urinary incontinence     Postoperative Diagnosis: Same      Procedure: Apical suspension wi placed into the bladd and drained. The vaginal hysterectomy was performed by Dr. Yadira Mendez with my assistance. See operative note for details. A retractor was used for visualization.   After completion of the vaginal hysterectomy with the cuff was sti the ureteral orifices was visualized with each cystoscopy. The Port chad and retroperitonealizing stitches were all then cut. The 12 F catheter was replaced into the bladder.        The remainder of the vaginal cuff was partially closed with intterrupted 2-0 anchor deployed into the left obturator membrane. The vaginal sulcus was examined after deployment of the sling anchor to ensure the anchor was appropriately placed. The sling was then passed and anchored on the patient's right obturator membrane.  The sli

## 2019-08-26 NOTE — ANESTHESIA POSTPROCEDURE EVALUATION
[address] [place] [address] Patient Status:  Outpatient in a Bed   Age/Gender 59year old female MRN [id number]   [facility] SURGERY Attending Tatyana David MD   Hosp Day # 0 PCP Jeison Schwartz MD       Anesthesia Post-op Not

## 2019-08-26 NOTE — OPERATIVE REPORT
[facility]  Post-Op Progress Note    Дмитрий Chi Patient Status:  Outpatient in a Bed    1955 MRN [id number]   [facility] SURGERY Attending Fausto Iyer MD   Hosp Day # 0 PCP Alton Belle MD     Preoperative Diag of the uterine vessels. The anterior cul-de-sac was then entered sharply with the metzenbaum scissors. Additional bites were taken along the cardinal ligament until the uteroovarian complexes were reached.   The uteroovarian ligaments were clamped and cut

## 2019-08-27 LAB
DEPRECATED RDW RBC AUTO: 45.1 FL (ref 35.1–46.3)
ERYTHROCYTE [DISTWIDTH] IN BLOOD BY AUTOMATED COUNT: 15.9 % (ref 11–15)
HCT VFR BLD AUTO: 30.4 % (ref 35–48)
HGB BLD-MCNC: 9.2 G/DL (ref 12–16)
MCH RBC QN AUTO: 23.8 PG (ref 26–34)
MCHC RBC AUTO-ENTMCNC: 30.3 G/DL (ref 31–37)
MCV RBC AUTO: 78.8 FL (ref 80–100)
PLATELET # BLD AUTO: 248 10(3)UL (ref 150–450)
RBC # BLD AUTO: 3.86 X10(6)UL (ref 3.8–5.3)
WBC # BLD AUTO: 14.7 X10(3) UL (ref 4–11)

## 2019-08-27 PROCEDURE — 85027 COMPLETE CBC AUTOMATED: CPT | Performed by: OBSTETRICS & GYNECOLOGY

## 2019-08-27 NOTE — PLAN OF CARE
Pt alert and orientatedx4. 2 liters of oxygen. Pulse Ox. Encouraged to use IS . Upper and lower dentures. SCDs. Regular diet and tolerating well. Mcgraw catheter, orange drainage. IVF. VSS. Incision to genitalia, Peripads and mesh panties. IV antibotics.  To strengthening/mobility  - Encourage toileting schedule  Outcome: Progressing     Problem: DISCHARGE PLANNING  Goal: Discharge to home or other facility with appropriate resources  Description  INTERVENTIONS:  - Identify barriers to discharge w/pt and careg

## 2019-08-27 NOTE — PROGRESS NOTES
Gyn Post-op PN    No acute events overnight. Denies pain. Ambulating, tolerating PO, tidwell out, due to void. +Flatus.     /71 (BP Location: Left arm)   Pulse 55   Temp 97.9 °F (36.6 °C) (Oral)   Resp 18   Ht 5' 7\" (1.702 m)   Wt 180 lb 12.4 oz (82

## 2019-08-27 NOTE — PLAN OF CARE
Upon reassessment, pt assisted to bathroom to attempt to urinate, however unable to. Bladder scan perform and 255 ml of retained urine noted. Trice TREJO called and notified of above. Trice stated to give pt extra time to urinate on her own.  Pt informed of ab

## 2019-08-27 NOTE — PLAN OF CARE
Upon assessment, VSS, afebrile. Pt c/o mid/low back pain more than abdominal pain. Tramadol given as ordered. Lidocaine patch ordered and applied. Abdomen is soft, nondistended and tender. Bowel sounds present x4. Pt reports passing flatus.  Pt consumed sma based on assessment  - Modify environment to reduce risk of injury  - Provide assistive devices as appropriate  - Consider OT/PT consult to assist with strengthening/mobility  - Encourage toileting schedule  Outcome: Progressing     Problem: DISCHARGE PLAN medication profile  - Implement strategies to promote bladder emptying  Outcome: Progressing     Problem: SKIN/TISSUE INTEGRITY - ADULT  Goal: Incision(s), wounds(s) or drain site(s) healing without S/S of infection  Description  INTERVENTIONS:  - Assess a

## 2019-08-27 NOTE — PLAN OF CARE
While assessing, pt reports bilateral lower leg pain. No swelling noted. Pedal pulses, +2. Negative Susannah's sign bilaterally. Dr. Rosa Sandoval at bedside and notified. MD states to continue to monitor and notify of any changes or increased pain.  Will continue to

## 2019-08-27 NOTE — PROGRESS NOTES
[facility]  Urology Progress Note    Mkiel Lilly Patient Status:  Outpatient in a Bed    1955 MRN [id number]   [facility] 3NW-A Attending Camila Spence MD   Hosp Day # 0 PCP Marcia Pardo MD     Subjective:  Wallace Smith

## 2019-08-28 VITALS
OXYGEN SATURATION: 94 % | DIASTOLIC BLOOD PRESSURE: 83 MMHG | SYSTOLIC BLOOD PRESSURE: 145 MMHG | RESPIRATION RATE: 18 BRPM | WEIGHT: 180.75 LBS | TEMPERATURE: 98 F | HEIGHT: 67 IN | HEART RATE: 76 BPM | BODY MASS INDEX: 28.37 KG/M2

## 2019-08-28 NOTE — PROGRESS NOTES
Pt AOx4. Resting in bed. Up ad amanda. Pain managed with toradol and tramadol. Tolerating diet. Passing flatus. Bowel sounds active. Small drainage on peripad. Mcgraw draining clear yellow. IVF. SCDs on. IS up to 1500. Will continue to monitor.     2000 assiste

## 2019-08-28 NOTE — PLAN OF CARE
Problem: PAIN - ADULT  Goal: Verbalizes/displays adequate comfort level or patient's stated pain goal  Description  INTERVENTIONS:  - Encourage pt to monitor pain and request assistance  - Assess pain using appropriate pain scale  - Administer analgesics appropriate  - Identify discharge learning needs (meds, wound care, etc)  - Arrange for interpreters to assist at discharge as needed  - Consider post-discharge preferences of patient/family/discharge partner  - Complete POLST form as appropriate  - Assess Ulcer prevention bundle as indicated  Outcome: Progressing

## 2019-08-28 NOTE — PROGRESS NOTES
Mcgraw catheter inserted per orders with large amount clear yellow urine output noted. Patient tolerated it well.

## 2019-08-28 NOTE — PLAN OF CARE
Patients 2 IV's, d/c'd, catheters intact. All discharge instructions explained, all questions answered. Patient will be discharged via wheelchair with support staff.

## 2019-08-28 NOTE — PROGRESS NOTES
Gyn Post-op PN    Failed void trial yesterday. Had pain when bladder was full, immediately improved when tidwell replaced. Denies pain currently. Ambulating, tolerating PO. +Flatus. Tidwell in place now, planning repeat void trial today.     /75 (BP L

## 2019-08-28 NOTE — PROGRESS NOTES
Patient tried to void with no results. Bladder scan showing 600 cc. Patient again wanted to try to void and voided 10 cc only. Dr. John Tobar contacted and informed of above. Orders given to insert Mcgraw and not to discharge patient tonight.

## 2019-08-28 NOTE — PROGRESS NOTES
[facility]  Urology Progress Note    Hira Garvey Patient Status:  Inpatient    1955 MRN [id number]   [facility] 3NW-A Attending Aneta Leon MD   Hosp Day # 0 PCP Papo Brian MD     Subjective:  Hira Garvey KERLINE  [facility] Urology  8/28/2019  8:09 AM

## 2019-08-28 NOTE — DISCHARGE SUMMARY
[facility]  Discharge Summary    [facility] Patient Status:  Inpatient    1955 MRN [id number]   [facility] 3NW-A Attending Leta Murphy MD   Hosp Day # 0 PCP Bj Valdez MD     Date of Admission: 2019    D Self Care    Discharge Condition: Good    Discharge Medications: Current Discharge Medication List    START taking these medications    Ketorolac Tromethamine 10 MG Oral Tab  Take 1 tablet (10 mg total) by mouth every 6 (six) hours as needed for Pain.   Higinio Canavan 2 (two) times daily. Qty: 180 tablet Refills: 1  Associated Diagnoses:Iron deficiency anemia, unspecified iron deficiency anemia type    traMADol HCl 50 MG Oral Tab  Take 1 tablet (50 mg total) by mouth daily as needed for Pain (headache).  May cause drows

## 2019-10-08 PROCEDURE — 87086 URINE CULTURE/COLONY COUNT: CPT | Performed by: UROLOGY

## 2019-10-08 PROCEDURE — 87088 URINE BACTERIA CULTURE: CPT | Performed by: UROLOGY

## 2019-10-08 PROCEDURE — 87186 SC STD MICRODIL/AGAR DIL: CPT | Performed by: UROLOGY

## 2020-01-15 ENCOUNTER — HOSPITAL ENCOUNTER (OUTPATIENT)
Dept: GENERAL RADIOLOGY | Facility: HOSPITAL | Age: 65
Discharge: HOME | End: 2020-01-15
Attending: FAMILY MEDICINE
Payer: COMMERCIAL

## 2020-01-15 DIAGNOSIS — J18.9 PNEUMONIA OF RIGHT LOWER LOBE DUE TO INFECTIOUS ORGANISM: ICD-10-CM

## 2020-01-15 PROCEDURE — 71046 X-RAY EXAM CHEST 2 VIEWS: CPT | Performed by: FAMILY MEDICINE

## 2020-01-18 ENCOUNTER — APPOINTMENT (OUTPATIENT)
Dept: GENERAL RADIOLOGY | Facility: HOSPITAL | Age: 65
DRG: 202 | End: 2020-01-18
Attending: EMERGENCY MEDICINE
Payer: COMMERCIAL

## 2020-01-18 ENCOUNTER — HOSPITAL ENCOUNTER (INPATIENT)
Facility: HOSPITAL | Age: 65
LOS: 6 days | Discharge: HOME | DRG: 202 | End: 2020-01-24
Attending: EMERGENCY MEDICINE | Admitting: HOSPITALIST
Payer: COMMERCIAL

## 2020-01-18 DIAGNOSIS — R06.00 DYSPNEA, UNSPECIFIED TYPE: ICD-10-CM

## 2020-01-18 DIAGNOSIS — J98.01 BRONCHOSPASM: ICD-10-CM

## 2020-01-18 DIAGNOSIS — J06.9 VIRAL URI: Primary | ICD-10-CM

## 2020-01-18 PROBLEM — E87.1 HYPONATREMIA: Status: ACTIVE | Noted: 2020-01-18

## 2020-01-18 PROBLEM — D64.9 ANEMIA: Status: ACTIVE | Noted: 2020-01-18

## 2020-01-18 PROBLEM — R73.9 HYPERGLYCEMIA: Status: ACTIVE | Noted: 2020-01-18

## 2020-01-18 PROBLEM — E87.3 METABOLIC ALKALOSIS: Status: ACTIVE | Noted: 2020-01-18

## 2020-01-18 LAB
ADENOVIRUS PCR:: NEGATIVE
ALBUMIN SERPL-MCNC: 3.4 G/DL (ref 3.4–5)
ALBUMIN/GLOB SERPL: 0.7 {RATIO} (ref 1–2)
ALLENS TEST: POSITIVE
ALP LIVER SERPL-CCNC: 111 U/L (ref 50–130)
ALT SERPL-CCNC: 29 U/L (ref 13–56)
ANION GAP SERPL CALC-SCNC: 6 MMOL/L (ref 0–18)
APTT PPP: 30.7 SECONDS (ref 25.4–36.1)
ARTERIAL BLD GAS O2 SATURATION: 93 % (ref 92–100)
ARTERIAL BLOOD GAS BASE EXCESS: -3.1 MMOL/L (ref ?–2)
ARTERIAL BLOOD GAS HCO3: 21.6 MEQ/L (ref 22–26)
ARTERIAL BLOOD GAS PCO2: 37 MM HG (ref 35–45)
ARTERIAL BLOOD GAS PH: 7.38 (ref 7.35–7.45)
ARTERIAL BLOOD GAS PO2: 69 MM HG (ref 80–105)
AST SERPL-CCNC: 23 U/L (ref 15–37)
B PERT DNA SPEC QL NAA+PROBE: NEGATIVE
BASOPHILS # BLD AUTO: 0.04 X10(3) UL (ref 0–0.2)
BASOPHILS NFR BLD AUTO: 0.5 %
BILIRUB SERPL-MCNC: 0.3 MG/DL (ref 0.1–2)
BUN BLD-MCNC: 15 MG/DL (ref 7–18)
BUN/CREAT SERPL: 17.9 (ref 10–20)
C PNEUM DNA SPEC QL NAA+PROBE: NEGATIVE
CALCIUM BLD-MCNC: 8.7 MG/DL (ref 8.5–10.1)
CALCULATED O2 SATURATION: 93 % (ref 92–100)
CARBOXYHEMOGLOBIN: 0.9 % SAT (ref 0–3)
CHLORIDE SERPL-SCNC: 106 MMOL/L (ref 98–112)
CO2 SERPL-SCNC: 23 MMOL/L (ref 21–32)
CORONAVIRUS 229E PCR:: NEGATIVE
CORONAVIRUS HKU1 PCR:: NEGATIVE
CORONAVIRUS NL63 PCR:: NEGATIVE
CORONAVIRUS OC43 PCR:: NEGATIVE
CREAT BLD-MCNC: 0.84 MG/DL (ref 0.55–1.02)
DEPRECATED RDW RBC AUTO: 43.7 FL (ref 35.1–46.3)
EOSINOPHIL # BLD AUTO: 0.43 X10(3) UL (ref 0–0.7)
EOSINOPHIL NFR BLD AUTO: 5.6 %
ERYTHROCYTE [DISTWIDTH] IN BLOOD BY AUTOMATED COUNT: 14.6 % (ref 11–15)
FLUAV RNA SPEC QL NAA+PROBE: NEGATIVE
FLUBV RNA SPEC QL NAA+PROBE: NEGATIVE
GLOBULIN PLAS-MCNC: 4.7 G/DL (ref 2.8–4.4)
GLUCOSE BLD-MCNC: 103 MG/DL (ref 70–99)
HCT VFR BLD AUTO: 37.2 % (ref 35–48)
HGB BLD-MCNC: 11.7 G/DL (ref 12–16)
IMM GRANULOCYTES # BLD AUTO: 0.03 X10(3) UL (ref 0–1)
IMM GRANULOCYTES NFR BLD: 0.4 %
INR BLD: 0.97 (ref 0.9–1.1)
LYMPHOCYTES # BLD AUTO: 1.78 X10(3) UL (ref 1–4)
LYMPHOCYTES NFR BLD AUTO: 23.2 %
M PROTEIN MFR SERPL ELPH: 8.1 G/DL (ref 6.4–8.2)
MCH RBC QN AUTO: 25.7 PG (ref 26–34)
MCHC RBC AUTO-ENTMCNC: 31.5 G/DL (ref 31–37)
MCV RBC AUTO: 81.8 FL (ref 80–100)
METAPNEUMOVIRUS PCR:: NEGATIVE
METHEMOGLOBIN: 0.5 % SAT (ref 0.4–1.5)
MONOCYTES # BLD AUTO: 0.6 X10(3) UL (ref 0.1–1)
MONOCYTES NFR BLD AUTO: 7.8 %
MYCOPLASMA PNEUMONIA PCR:: NEGATIVE
NEUTROPHILS # BLD AUTO: 4.79 X10 (3) UL (ref 1.5–7.7)
NEUTROPHILS # BLD AUTO: 4.79 X10(3) UL (ref 1.5–7.7)
NEUTROPHILS NFR BLD AUTO: 62.5 %
NT-PROBNP SERPL-MCNC: 166 PG/ML (ref ?–125)
OSMOLALITY SERPL CALC.SUM OF ELEC: 281 MOSM/KG (ref 275–295)
P/F RATIO: 328.1 MMHG
PARAINFLUENZA 1 PCR:: NEGATIVE
PARAINFLUENZA 2 PCR:: NEGATIVE
PARAINFLUENZA 3 PCR:: NEGATIVE
PARAINFLUENZA 4 PCR:: NEGATIVE
PATIENT TEMPERATURE: 98.6 F
PLATELET # BLD AUTO: 260 10(3)UL (ref 150–450)
POTASSIUM SERPL-SCNC: 3.7 MMOL/L (ref 3.5–5.1)
PROCALCITONIN SERPL-MCNC: 0.09 NG/ML
PSA SERPL DL<=0.01 NG/ML-MCNC: 13.3 SECONDS (ref 12.5–14.7)
RBC # BLD AUTO: 4.55 X10(6)UL (ref 3.8–5.3)
RHINOVIRUS/ENTERO PCR:: NEGATIVE
RSV RNA SPEC QL NAA+PROBE: POSITIVE
SODIUM SERPL-SCNC: 135 MMOL/L (ref 136–145)
TOTAL HEMOGLOBIN: 11.5 G/DL (ref 11.7–16)
TROPONIN I SERPL-MCNC: <0.045 NG/ML (ref ?–0.04)
WBC # BLD AUTO: 7.7 X10(3) UL (ref 4–11)

## 2020-01-18 PROCEDURE — 94640 AIRWAY INHALATION TREATMENT: CPT

## 2020-01-18 PROCEDURE — 93010 ELECTROCARDIOGRAM REPORT: CPT

## 2020-01-18 PROCEDURE — 83880 ASSAY OF NATRIURETIC PEPTIDE: CPT | Performed by: EMERGENCY MEDICINE

## 2020-01-18 PROCEDURE — 87486 CHLMYD PNEUM DNA AMP PROBE: CPT | Performed by: EMERGENCY MEDICINE

## 2020-01-18 PROCEDURE — 96361 HYDRATE IV INFUSION ADD-ON: CPT

## 2020-01-18 PROCEDURE — 99285 EMERGENCY DEPT VISIT HI MDM: CPT

## 2020-01-18 PROCEDURE — 87999 UNLISTED MICROBIOLOGY PX: CPT

## 2020-01-18 PROCEDURE — 87633 RESP VIRUS 12-25 TARGETS: CPT | Performed by: EMERGENCY MEDICINE

## 2020-01-18 PROCEDURE — 85025 COMPLETE CBC W/AUTO DIFF WBC: CPT | Performed by: EMERGENCY MEDICINE

## 2020-01-18 PROCEDURE — 71045 X-RAY EXAM CHEST 1 VIEW: CPT | Performed by: EMERGENCY MEDICINE

## 2020-01-18 PROCEDURE — 87798 DETECT AGENT NOS DNA AMP: CPT | Performed by: EMERGENCY MEDICINE

## 2020-01-18 PROCEDURE — 85018 HEMOGLOBIN: CPT | Performed by: EMERGENCY MEDICINE

## 2020-01-18 PROCEDURE — 36415 COLL VENOUS BLD VENIPUNCTURE: CPT

## 2020-01-18 PROCEDURE — 87040 BLOOD CULTURE FOR BACTERIA: CPT | Performed by: EMERGENCY MEDICINE

## 2020-01-18 PROCEDURE — 96374 THER/PROPH/DIAG INJ IV PUSH: CPT

## 2020-01-18 PROCEDURE — 93005 ELECTROCARDIOGRAM TRACING: CPT

## 2020-01-18 PROCEDURE — 83050 HGB METHEMOGLOBIN QUAN: CPT | Performed by: EMERGENCY MEDICINE

## 2020-01-18 PROCEDURE — 85730 THROMBOPLASTIN TIME PARTIAL: CPT | Performed by: EMERGENCY MEDICINE

## 2020-01-18 PROCEDURE — 82803 BLOOD GASES ANY COMBINATION: CPT | Performed by: EMERGENCY MEDICINE

## 2020-01-18 PROCEDURE — 87581 M.PNEUMON DNA AMP PROBE: CPT | Performed by: EMERGENCY MEDICINE

## 2020-01-18 PROCEDURE — 85610 PROTHROMBIN TIME: CPT | Performed by: EMERGENCY MEDICINE

## 2020-01-18 PROCEDURE — 84484 ASSAY OF TROPONIN QUANT: CPT | Performed by: EMERGENCY MEDICINE

## 2020-01-18 PROCEDURE — 82375 ASSAY CARBOXYHB QUANT: CPT | Performed by: EMERGENCY MEDICINE

## 2020-01-18 PROCEDURE — 80053 COMPREHEN METABOLIC PANEL: CPT | Performed by: EMERGENCY MEDICINE

## 2020-01-18 PROCEDURE — 84145 PROCALCITONIN (PCT): CPT | Performed by: EMERGENCY MEDICINE

## 2020-01-18 PROCEDURE — 36600 WITHDRAWAL OF ARTERIAL BLOOD: CPT | Performed by: EMERGENCY MEDICINE

## 2020-01-18 RX ORDER — CODEINE PHOSPHATE AND GUAIFENESIN 10; 100 MG/5ML; MG/5ML
5 SOLUTION ORAL EVERY 6 HOURS PRN
Status: DISCONTINUED | OUTPATIENT
Start: 2020-01-18 | End: 2020-01-24

## 2020-01-18 RX ORDER — HEPARIN SODIUM 5000 [USP'U]/ML
5000 INJECTION, SOLUTION INTRAVENOUS; SUBCUTANEOUS EVERY 8 HOURS SCHEDULED
Status: DISCONTINUED | OUTPATIENT
Start: 2020-01-18 | End: 2020-01-20

## 2020-01-18 RX ORDER — METHYLPREDNISOLONE SODIUM SUCCINATE 125 MG/2ML
60 INJECTION, POWDER, LYOPHILIZED, FOR SOLUTION INTRAMUSCULAR; INTRAVENOUS EVERY 8 HOURS
Status: DISCONTINUED | OUTPATIENT
Start: 2020-01-18 | End: 2020-01-23

## 2020-01-18 RX ORDER — VERAPAMIL HYDROCHLORIDE 240 MG/1
240 TABLET, FILM COATED, EXTENDED RELEASE ORAL
Status: DISCONTINUED | OUTPATIENT
Start: 2020-01-19 | End: 2020-01-24

## 2020-01-18 RX ORDER — METOPROLOL SUCCINATE 25 MG/1
25 TABLET, EXTENDED RELEASE ORAL DAILY
Status: DISCONTINUED | OUTPATIENT
Start: 2020-01-19 | End: 2020-01-24

## 2020-01-18 RX ORDER — ATORVASTATIN CALCIUM 20 MG/1
20 TABLET, FILM COATED ORAL NIGHTLY
Status: DISCONTINUED | OUTPATIENT
Start: 2020-01-18 | End: 2020-01-24

## 2020-01-18 RX ORDER — CALCIUM CARBONATE/VITAMIN D3 250-3.125
2 TABLET ORAL DAILY
Status: DISCONTINUED | OUTPATIENT
Start: 2020-01-19 | End: 2020-01-24

## 2020-01-18 RX ORDER — ASPIRIN 81 MG/1
81 TABLET, CHEWABLE ORAL DAILY
Status: DISCONTINUED | OUTPATIENT
Start: 2020-01-18 | End: 2020-01-24

## 2020-01-18 RX ORDER — SODIUM CHLORIDE 9 MG/ML
INJECTION, SOLUTION INTRAVENOUS ONCE
Status: COMPLETED | OUTPATIENT
Start: 2020-01-18 | End: 2020-01-18

## 2020-01-18 RX ORDER — MONTELUKAST SODIUM 10 MG/1
10 TABLET ORAL NIGHTLY
Status: DISCONTINUED | OUTPATIENT
Start: 2020-01-18 | End: 2020-01-24

## 2020-01-18 RX ORDER — IPRATROPIUM BROMIDE AND ALBUTEROL SULFATE 2.5; .5 MG/3ML; MG/3ML
3 SOLUTION RESPIRATORY (INHALATION) ONCE
Status: COMPLETED | OUTPATIENT
Start: 2020-01-18 | End: 2020-01-18

## 2020-01-18 RX ORDER — BUTALBITAL, ACETAMINOPHEN AND CAFFEINE 50; 325; 40 MG/1; MG/1; MG/1
1 TABLET ORAL EVERY 4 HOURS PRN
Status: DISCONTINUED | OUTPATIENT
Start: 2020-01-18 | End: 2020-01-24

## 2020-01-18 RX ORDER — SODIUM CHLORIDE 9 MG/ML
INJECTION, SOLUTION INTRAVENOUS CONTINUOUS
Status: ACTIVE | OUTPATIENT
Start: 2020-01-18 | End: 2020-01-18

## 2020-01-18 RX ORDER — FAMOTIDINE 20 MG/1
40 TABLET ORAL NIGHTLY PRN
Status: DISCONTINUED | OUTPATIENT
Start: 2020-01-18 | End: 2020-01-24

## 2020-01-18 RX ORDER — MELATONIN
325 2 TIMES DAILY WITH MEALS
Status: DISCONTINUED | OUTPATIENT
Start: 2020-01-18 | End: 2020-01-24

## 2020-01-18 RX ORDER — ONDANSETRON 2 MG/ML
4 INJECTION INTRAMUSCULAR; INTRAVENOUS EVERY 4 HOURS PRN
Status: DISCONTINUED | OUTPATIENT
Start: 2020-01-18 | End: 2020-01-18 | Stop reason: SDUPTHER

## 2020-01-18 RX ORDER — FAMOTIDINE 20 MG/1
40 TABLET ORAL NIGHTLY PRN
COMMUNITY
End: 2020-03-11

## 2020-01-18 RX ORDER — LEVOTHYROXINE SODIUM 88 UG/1
88 TABLET ORAL
Status: DISCONTINUED | OUTPATIENT
Start: 2020-01-19 | End: 2020-01-24

## 2020-01-18 RX ORDER — IPRATROPIUM BROMIDE AND ALBUTEROL SULFATE 2.5; .5 MG/3ML; MG/3ML
3 SOLUTION RESPIRATORY (INHALATION)
Status: DISCONTINUED | OUTPATIENT
Start: 2020-01-18 | End: 2020-01-23

## 2020-01-18 RX ORDER — ONDANSETRON 2 MG/ML
4 INJECTION INTRAMUSCULAR; INTRAVENOUS EVERY 6 HOURS PRN
Status: DISCONTINUED | OUTPATIENT
Start: 2020-01-18 | End: 2020-01-24

## 2020-01-18 RX ORDER — METHYLPREDNISOLONE SODIUM SUCCINATE 125 MG/2ML
125 INJECTION, POWDER, LYOPHILIZED, FOR SOLUTION INTRAMUSCULAR; INTRAVENOUS ONCE
Status: COMPLETED | OUTPATIENT
Start: 2020-01-18 | End: 2020-01-18

## 2020-01-18 RX ORDER — IPRATROPIUM BROMIDE AND ALBUTEROL SULFATE 2.5; .5 MG/3ML; MG/3ML
3 SOLUTION RESPIRATORY (INHALATION) EVERY 4 HOURS PRN
Status: DISCONTINUED | OUTPATIENT
Start: 2020-01-18 | End: 2020-01-18

## 2020-01-18 RX ORDER — TRAMADOL HYDROCHLORIDE 50 MG/1
50 TABLET ORAL DAILY PRN
Status: DISCONTINUED | OUTPATIENT
Start: 2020-01-18 | End: 2020-01-24

## 2020-01-18 RX ORDER — SERTRALINE HYDROCHLORIDE 100 MG/1
100 TABLET, FILM COATED ORAL DAILY
Status: DISCONTINUED | OUTPATIENT
Start: 2020-01-19 | End: 2020-01-24

## 2020-01-18 RX ORDER — BUTALBITAL, ACETAMINOPHEN AND CAFFEINE 50; 325; 40 MG/1; MG/1; MG/1
1 TABLET ORAL EVERY 4 HOURS PRN
COMMUNITY
End: 2021-04-22

## 2020-01-18 RX ORDER — AMOXICILLIN AND CLAVULANATE POTASSIUM 875; 125 MG/1; MG/1
1 TABLET, FILM COATED ORAL 2 TIMES DAILY
Status: DISCONTINUED | OUTPATIENT
Start: 2020-01-18 | End: 2020-01-24

## 2020-01-18 NOTE — H&P
[facility] Hospitalist H&P       CC: Patient presents with:  Dyspnea CINDY SOB       PCP: Bj Valdez MD    History of Present Illness:  Patient is a 62year old female with recently d/w PNA on augmentin day #3 has been having worsening SOB, cough, ca OTHER      Left wrist/ nerve release- 2018   • OTHER SURGICAL HISTORY      R PULMONARY LOBE-removed partial lobe AS INFANT DUE TO PNEUMONIA   • OTHER SURGICAL HISTORY      DENTAL   • OTHER SURGICAL HISTORY      CERVICAL HNP SURGERY   • OTHER SURGICAL HISTO Tablet 24 Hr, Take 1 tablet (25 mg total) by mouth daily. , Disp: 90 tablet, Rfl: 0  traMADol HCl 50 MG Oral Tab, Take 1 tablet (50 mg total) by mouth daily as needed for Pain (headache). May cause drowsiness.  No driving., Disp: 90 tablet, Rfl: 0  simvastat History    Tobacco Use      Smoking status: Never Smoker      Smokeless tobacco: Never Used    Alcohol use:  Yes      Alcohol/week: 0.0 - 2.0 standard drinks      Frequency: Monthly or less      Drinks per session: 1 or 2      Binge frequency: Never      Co CHEST AP PORTABLE  (CPT=71010), 8/14/2014, 2:15.  TECHNIQUE:  PA and lateral chest radiographs were obtained. PATIENT STATED HISTORY: (As transcribed by Technologist)  Patient here with a productive cough, fever, body aches and chills since yesterday. hypoxia  - recently d/w PNA and started on augmentin   - now +RSV, bronchospasm likely cause of symptoms  - +orthopnea however bnp not impressive on euvolemic on exam. CXR without signs of overload.    - CXR with improvement in PNA  - IV steroids given in E

## 2020-01-18 NOTE — PLAN OF CARE
Admission navigator completed. A/O x4. Voids. VSS. BM reported this AM per pt. On 2L NC. . Baseline is ra. Isolation in place. Pos RSV. Call light within reach. Will cont to monitor.

## 2020-01-18 NOTE — ED PROVIDER NOTES
Patient Seen in: [facility] Emergency Department      History   Patient presents with:  Dyspnea CINDY SOB    Stated Complaint: CINDY    HPI    Patient is a pleasant 81-year-old female, presenting for evaluation of dyspnea, pneumonia, and wheezing.     Pa OR   • COLONOSCOPY     • COLONOSCOPY  4/12/19= Diverticulosis    Repeat 2029   • COLONOSCOPY N/A 4/12/2019    Performed by Ramón Andrews MD at [facility] ENDOSCOPY   • ESOPHAGOGASTRODUODENOSCOPY (EGD) N/A 4/12/2019    Performed by Ramón Andrews MD at [facility] [address] Resp 14   Temp 98.1 °F (36.7 °C)   Temp src Oral   SpO2 95 %   O2 Device None (Room air)       Current:/74   Pulse 61   Temp 98.1 °F (36.7 °C) (Oral)   Resp 15   Ht 170.2 cm (5' 7\")   Wt 81 kg   LMP 09/20/2009   SpO2 94%   BMI 27.97 kg/m²       Ph within normal limits   CBC W/ DIFFERENTIAL - Abnormal; Notable for the following components:    HGB 11.7 (*)     MCH 25.7 (*)     All other components within normal limits   TROPONIN I - Normal   PTT, ACTIVATED - Normal   PROTHROMBIN TIME (PT) - Normal   P Increased right lower lobe airspace opacity suspicious for an area of pneumonia. Follow-up is recommended to ensure resolution. If clinical symptoms persist then recommend CT.   Findings were called to the referring service by radiology staff at [facility] bronchospasm. Exertional pulse oximetry drops below 90%. Admission for supportive care was recommended. Patient will be admitted to the service of the [address] Dr. Peter Rodney notified and requested pulmonology consultation.   Ca

## 2020-01-18 NOTE — ED INITIAL ASSESSMENT (HPI)
Pt states diagnosed with pneumonia on Wednesday, started on antibiotics. Pt c/o increased CINDY and wheezing today.

## 2020-01-19 ENCOUNTER — APPOINTMENT (OUTPATIENT)
Dept: CT IMAGING | Facility: HOSPITAL | Age: 65
DRG: 202 | End: 2020-01-19
Attending: HOSPITALIST
Payer: COMMERCIAL

## 2020-01-19 LAB
ANION GAP SERPL CALC-SCNC: 7 MMOL/L (ref 0–18)
ATRIAL RATE: 65 BPM
ATRIAL RATE: 80 BPM
BUN BLD-MCNC: 19 MG/DL (ref 7–18)
BUN/CREAT SERPL: 20 (ref 10–20)
CALCIUM BLD-MCNC: 9.4 MG/DL (ref 8.5–10.1)
CHLORIDE SERPL-SCNC: 104 MMOL/L (ref 98–112)
CO2 SERPL-SCNC: 24 MMOL/L (ref 21–32)
CREAT BLD-MCNC: 0.95 MG/DL (ref 0.55–1.02)
GLUCOSE BLD-MCNC: 159 MG/DL (ref 70–99)
OSMOLALITY SERPL CALC.SUM OF ELEC: 286 MOSM/KG (ref 275–295)
P AXIS: 48 DEGREES
P AXIS: 63 DEGREES
P-R INTERVAL: 160 MS
P-R INTERVAL: 178 MS
POTASSIUM SERPL-SCNC: 3.8 MMOL/L (ref 3.5–5.1)
Q-T INTERVAL: 388 MS
Q-T INTERVAL: 420 MS
QRS DURATION: 100 MS
QRS DURATION: 90 MS
QTC CALCULATION (BEZET): 436 MS
QTC CALCULATION (BEZET): 447 MS
R AXIS: 56 DEGREES
R AXIS: 66 DEGREES
SODIUM SERPL-SCNC: 135 MMOL/L (ref 136–145)
T AXIS: 48 DEGREES
T AXIS: 61 DEGREES
TROPONIN I SERPL-MCNC: <0.045 NG/ML (ref ?–0.04)
VENTRICULAR RATE: 65 BPM
VENTRICULAR RATE: 80 BPM

## 2020-01-19 PROCEDURE — 94640 AIRWAY INHALATION TREATMENT: CPT

## 2020-01-19 PROCEDURE — 80048 BASIC METABOLIC PNL TOTAL CA: CPT | Performed by: HOSPITALIST

## 2020-01-19 PROCEDURE — 93005 ELECTROCARDIOGRAM TRACING: CPT

## 2020-01-19 PROCEDURE — 84484 ASSAY OF TROPONIN QUANT: CPT | Performed by: HOSPITALIST

## 2020-01-19 PROCEDURE — 93010 ELECTROCARDIOGRAM REPORT: CPT | Performed by: INTERNAL MEDICINE

## 2020-01-19 PROCEDURE — 71275 CT ANGIOGRAPHY CHEST: CPT | Performed by: HOSPITALIST

## 2020-01-19 RX ORDER — TRAZODONE HYDROCHLORIDE 50 MG/1
25 TABLET ORAL NIGHTLY PRN
Status: DISCONTINUED | OUTPATIENT
Start: 2020-01-19 | End: 2020-01-24

## 2020-01-19 NOTE — PLAN OF CARE
Assumed care of the patient @ 07:00, resting in bed. Alert x's 4, initially denied pain, c/o headache later, fioracet given. Dry, strong cough. Weaned 02 to 1L NC at this time. Vitals stable, voiding freely, tolerating diet. IV patent.  IV steroids, nebs, r

## 2020-01-19 NOTE — PROGRESS NOTES
[facility] Hospitalist Progress Note     Cezar Poe Patient Status:  Inpatient    1955 MRN [id number]   [facility] 5NW-A Attending Karla Cruz MD   Hosp Day # 1 PCP Tony Harrell MD     CC: follow up    SUBJECTIVE:  SOB i Heparin Sodium (Porcine)  5,000 Units Subcutaneous Q8H [address]   • MethylPREDNISolone Sodium Succ  60 mg Intravenous Q8H   • ipratropium-albuterol  3 mL Nebulization 6 times per day     Continuous Infusing Medication:  PRN Medication:traZODone, Butalbital-APAP hypothyroidism  - cont synthroid    Prophylaxis:   DVT with hep sq    Dispo: floor    I discussed with Dr. Lisa Coleman. Questions/concerns were discussed with patient and/or family by bedside.     Arpan Marcus MD   [place] Hospitalist  [phone]

## 2020-01-19 NOTE — CONSULTS
[facility] Cardiology  Consultation Note      Messi Johanna Patient Status:  Inpatient    1955 MRN [id number]   [facility] 5NW-A Attending Reina Sheldon MD   Hosp Day # 1 PCP Freeman Hayes MD     Outpatient card Daily  Butalbital-APAP-Caffeine (FIORICET) per tab 1 tablet, 1 tablet, Oral, Q4H PRN  Calcium Carbonate-Vitamin D 250-125 MG-UNIT per tab TABS 2 tablet, 2 tablet, Oral, Daily  famoTIDine (PEPCID) tab 40 mg, 40 mg, Oral, Nightly PRN  ferrous sulfate EC tab • PONV (postoperative nausea and vomiting)    • Pre-diabetes 3/18/2002   • Problems with swallowing     feels like something stuck in throat at all times/ now resolved- 07/2019   • Vitamin D deficiency 1/13/2013       Past Surgical History:   Procedure L grandfather, maternal grandmother, and mother; Heart Disorder in her father and sister; Obesity in her daughter; Other in her daughter, father, sister, and son. Social History   reports that she has never smoked.  She has never used smokeless tobacco. Sh extremities normally  Psychiatric: Normal mood and affect; answers questions appropriately  Dermatologic: No rashes; normal skin turgor    Diagnostic testing:    EKG: Normal sinus rhythm    Labs:   Lab Results   Component Value Date    PT 13.8 07/31/2011

## 2020-01-19 NOTE — CONSULTS
[facility] Patient Status:  Inpatient    1955 MRN [id number]   [facility] 5NW-A Attending Meghann Simmons MD   Hosp Day # 1 PCP Caitlin Pritchard MD     Date of Admission: 2020 12:30 PM  Admission Neris PONV (postoperative nausea and vomiting)    • Pre-diabetes 3/18/2002   • Problems with swallowing     feels like something stuck in throat at all times/ now resolved- 07/2019   • Vitamin D deficiency 1/13/2013      Past Surgical History:   Procedure Tennie More never used smokeless tobacco. She reports current alcohol use. She reports that she does not use drugs.       Family History:  Family History   Problem Relation Age of Onset   • Diabetes Father    • Heart Disorder Father         \"HEART TROUBLE\"   • Other tablet (88 mcg total) by mouth once daily. , Disp: 90 tablet, Rfl: 1  Montelukast Sodium 10 MG Oral Tab, Take 10 mg by mouth nightly., Disp: , Rfl:   Ferrous Sulfate 325 (65 Fe) MG Oral Tab, Take 1 tablet (325 mg total) by mouth 2 (two) times daily. , Disp: ondansetron HCl (ZOFRAN) injection 4 mg, 4 mg, Intravenous, Q6H PRN  •  methylPREDNISolone Sodium Succ (Solu-MEDROL) injection 60 mg, 60 mg, Intravenous, Q8H  •  ipratropium-albuterol (DUONEB) nebulizer solution 3 mL, 3 mL, Nebulization, 6 times per day Exam:                          JYMMEFS: ESNIV, cooperative, in NAD                          HEENT: oropharynx clear without erythema or exudates, moist mucous membranes                          Lungs: scattered expiratory wheeze                          Ch

## 2020-01-20 ENCOUNTER — APPOINTMENT (OUTPATIENT)
Dept: CV DIAGNOSTICS | Facility: HOSPITAL | Age: 65
DRG: 202 | End: 2020-01-20
Attending: HOSPITALIST
Payer: COMMERCIAL

## 2020-01-20 LAB
ANION GAP SERPL CALC-SCNC: 7 MMOL/L (ref 0–18)
BASOPHILS # BLD AUTO: 0.02 X10(3) UL (ref 0–0.2)
BASOPHILS NFR BLD AUTO: 0.1 %
BUN BLD-MCNC: 20 MG/DL (ref 7–18)
BUN/CREAT SERPL: 24.7 (ref 10–20)
CALCIUM BLD-MCNC: 9 MG/DL (ref 8.5–10.1)
CHLORIDE SERPL-SCNC: 105 MMOL/L (ref 98–112)
CO2 SERPL-SCNC: 25 MMOL/L (ref 21–32)
CREAT BLD-MCNC: 0.81 MG/DL (ref 0.55–1.02)
DEPRECATED RDW RBC AUTO: 43.9 FL (ref 35.1–46.3)
EOSINOPHIL # BLD AUTO: 0 X10(3) UL (ref 0–0.7)
EOSINOPHIL NFR BLD AUTO: 0 %
ERYTHROCYTE [DISTWIDTH] IN BLOOD BY AUTOMATED COUNT: 14.8 % (ref 11–15)
GLUCOSE BLD-MCNC: 154 MG/DL (ref 70–99)
HCT VFR BLD AUTO: 33.7 % (ref 35–48)
HGB BLD-MCNC: 10.5 G/DL (ref 12–16)
IMM GRANULOCYTES # BLD AUTO: 0.15 X10(3) UL (ref 0–1)
IMM GRANULOCYTES NFR BLD: 1 %
LYMPHOCYTES # BLD AUTO: 1.7 X10(3) UL (ref 1–4)
LYMPHOCYTES NFR BLD AUTO: 11.3 %
MCH RBC QN AUTO: 25.5 PG (ref 26–34)
MCHC RBC AUTO-ENTMCNC: 31.2 G/DL (ref 31–37)
MCV RBC AUTO: 82 FL (ref 80–100)
MONOCYTES # BLD AUTO: 0.46 X10(3) UL (ref 0.1–1)
MONOCYTES NFR BLD AUTO: 3.1 %
NEUTROPHILS # BLD AUTO: 12.72 X10 (3) UL (ref 1.5–7.7)
NEUTROPHILS # BLD AUTO: 12.72 X10(3) UL (ref 1.5–7.7)
NEUTROPHILS NFR BLD AUTO: 84.5 %
OSMOLALITY SERPL CALC.SUM OF ELEC: 290 MOSM/KG (ref 275–295)
PLATELET # BLD AUTO: 297 10(3)UL (ref 150–450)
POTASSIUM SERPL-SCNC: 4.6 MMOL/L (ref 3.5–5.1)
RBC # BLD AUTO: 4.11 X10(6)UL (ref 3.8–5.3)
SODIUM SERPL-SCNC: 137 MMOL/L (ref 136–145)
WBC # BLD AUTO: 15.1 X10(3) UL (ref 4–11)

## 2020-01-20 PROCEDURE — 93306 TTE W/DOPPLER COMPLETE: CPT | Performed by: HOSPITALIST

## 2020-01-20 PROCEDURE — 94640 AIRWAY INHALATION TREATMENT: CPT

## 2020-01-20 PROCEDURE — 85025 COMPLETE CBC W/AUTO DIFF WBC: CPT | Performed by: HOSPITALIST

## 2020-01-20 PROCEDURE — 80048 BASIC METABOLIC PNL TOTAL CA: CPT | Performed by: HOSPITALIST

## 2020-01-20 NOTE — PROGRESS NOTES
[address] [place] [address] Patient Status:  Inpatient    1955 MRN [id number]   [facility] 5NW-A Attending Priti Gentile,*   Hosp Day # 2 PCP Neal Umana MD     SUBJECTIVE: Pt continues to have SOB and cou methylPREDNISolone Sodium Succ (Solu-MEDROL) injection 60 mg, 60 mg, Intravenous, Q8H  •  ipratropium-albuterol (DUONEB) nebulizer solution 3 mL, 3 mL, Nebulization, 6 times per day      Physical Exam:                          General: alert, cooperative, discharge    Kendrick Avila MD  1/20/2020  11:06 AM

## 2020-01-20 NOTE — CM/SW NOTE
Patient newly prescribed eliquis, script sent to patient's [facility] pharmacy. copay $30, informed patient and gave $10 copay savings card, verbalized understanding.     Linda Pritchett RN,   Phone [phone]

## 2020-01-20 NOTE — PROGRESS NOTES
Multidisciplinary Discharge Rounds held 1/20/2020. Treatment team members present today include , , Charge Nurse, Nurse, RT, PT and Pharmacy caring for [facility].      Other care providers present:    Mobility Goal: up in

## 2020-01-20 NOTE — PROGRESS NOTES
[facility] Hospitalist Progress Note     Varinder Chatterjee Patient Status:  Inpatient    1955 MRN [id number]   [facility] 5NW-A Attending Eula Flaherty MD   Hosp Day # 2 PCP Becky Bowden MD     CC: follow up    SUBJECTIVE:  Doing Infusing Medication:  PRN Medication:traZODone, Butalbital-APAP-Caffeine, famoTIDine, guaiFENesin-codeine, traMADol HCl, ondansetron HCl       Microbiology:    Hospital Encounter on 01/18/20   1.  BLOOD CULTURE     Status: None (Preliminary result)    Anel Foote

## 2020-01-20 NOTE — PLAN OF CARE
Patient is Aox4. Patient maintaining O2 sats on RA, nebs, IV steroids. Patient is NSR on tele. Receiving eliquis, started today per cards. Patient voids and is upself. Patient given robitussin w/ codeine d/t a cough. Patient updated on POC. [facility]. support system  Outcome: Progressing     Problem: RESPIRATORY - ADULT  Goal: Achieves optimal ventilation and oxygenation  Description  INTERVENTIONS:  - Assess for changes in respiratory status  - Assess for changes in mentation and behavior  - Position t

## 2020-01-20 NOTE — PLAN OF CARE
Problem: Patient/Family Goals  Goal: Patient/Family Long Term Goal  Description  Patient's Long Term Goal: to discharge home    Interventions:  - IV steroids  - See additional Care Plan goals for specific interventions   Outcome: Progressing  Goal: Patie to facilitate oxygenation and minimize respiratory effort  - Oxygen supplementation based on oxygen saturation or ABGs  - Provide Smoking Cessation handout, if applicable  - Encourage broncho-pulmonary hygiene including cough, deep breathe, Incentive Shazia Flanders

## 2020-01-20 NOTE — PROGRESS NOTES
[facility]  Cardiology Progress Note    German Lacks Patient Status:  Inpatient    1955 MRN [id number]   [facility] 5NW-A Attending Aman Hamilton,*   Hosp Day # 2 PCP Danuta Avila MD     Cardiology- seen a time.     Subjective: Persistent cough, dyspnea. No further chest pain.        Objective:         Temp:  [97.5 °F (36.4 °C)-98.3 °F (36.8 °C)] 98.1 °F (36.7 °C)  Pulse:  [70-96] 75  Resp:  [18-20] 20  BP: (125-139)/(56-76) 128/61  Temp (24hrs), Av.9 °F HGB 11.7* 10.5*   .0 297.0     Recent Labs     01/18/20  1320 01/19/20  1252 01/20/20  0630   BUN 15 19* 20*   CREATSERUM 0.84 0.95 0.81   * 135* 137   K 3.7 3.8 4.6    104 105   CO2 23.0 24.0 25.0   CA 8.7 9.4 9.0     Recent Labs

## 2020-01-20 NOTE — PAYOR COMM NOTE
--------------  ADMISSION REVIEW     Payor: DANIELLE GARVIN  Subscriber #:  [id number]  Authorization Number: [id number]     Admit date: 1/18/20  Admit time: 0834       Patient Seen in: [facility] Emergency Department    History   Patient presents with:  D by Leona Webster MD at [facility] MAIN OR   • COLONOSCOPY     • COLONOSCOPY  4/12/19= Diverticulosis    Repeat 2029   • COLONOSCOPY N/A 4/12/2019    Performed by Kenzie Tang MD at [facility] ENDOSCOPY   • ESOPHAGOGASTRODUODENOSCOPY (EGD) N/A 4/12/2019    Perform wheezing throughout, bilaterally, respirations are somewhat labored. HEART: Regular rate and rhythm. There are no rubs or gallops. ABDOMEN: The abdomen is soft, nondistended, nontender. Bowel sounds present. SKIN: Skin is pink warm and dry.  There are bronchospasm. Exertional pulse oximetry drops below 90%. Admission for supportive care was recommended.     Disposition and Plan     Clinical Impression:  Viral URI  (primary encounter diagnosis)  Dyspnea, unspecified type  Bronchospasm    Disposition:  A ENDOSCOPY   • ESOPHAGOGASTRODUODENOSCOPY (EGD) N/A 4/12/2019    Performed by Miguelina Silveira MD at [facility] ENDOSCOPY   • HYSTERECTOMY  08/26/2019   • OTHER      Left wrist/ nerve release- 2018   • OTHER SURGICAL HISTORY      R PULMONARY LOBE-removed partial lob Rfl: 0  Calcium Carbonate-Vitamin D 600-400 MG-UNIT Oral Tab, Take 1 tablet by mouth daily. , Disp: , Rfl:   Metoprolol Succinate ER 25 MG Oral Tablet 24 Hr, Take 1 tablet (25 mg total) by mouth daily. , Disp: 90 tablet, Rfl: 0  traMADol HCl 50 MG Oral Tab, Medication:ondansetron HCl, Butalbital-APAP-Caffeine, famoTIDine, guaiFENesin-codeine, traMADol HCl, ipratropium-albuterol      OBJECTIVE:  /74   Pulse 61   Temp 98.1 °F (36.7 °C) (Oral)   Resp 15   Ht 5' 7\" (1.702 m)   Wt 178 lb 9.2 oz (81 kg)   LM 14: 58     Approved by: Danica Laura MD on 1/15/2020 at 14:59          Xr Chest Ap Portable  (cpt=71045)    Result Date: 1/18/2020  PROCEDURE:  XR CHEST AP PORTABLE  (CPT=71045)  TECHNIQUE:  AP chest radiograph was obtained.   COMPARISON:  [facility] , XR, XR CHES because I will cough like crazy\".      OBJECTIVE:  Temp:  [97.4 °F (36.3 °C)-98.6 °F (37 °C)] 97.4 °F (36.3 °C)  Pulse:  [59-86] 86  Resp:  [14-18] 16  BP: (114-131)/(62-84) 131/70        Intake/Output Summary (Last 24 hours) at 1/19/2020 1213  Last data improvement in PNA  - IV steroids given in ED -cont IV steroids for now + scheduled nebs  - cont OP augmentin to complete course as previously advised  - wean O2 as able   - not much improvement subjectively today.  Reporting chest tightness sensation throu 1 tablet, 1 tablet, Oral, BID  •  aspirin chewable tab 81 mg, 81 mg, Oral, Daily  •  Butalbital-APAP-Caffeine (FIORICET) per tab 1 tablet, 1 tablet, Oral, Q4H PRN  •  Calcium Carbonate-Vitamin D 250-125 MG-UNIT per tab TABS 2 tablet, 2 tablet, Oral, Daily 01/20/2020     MCV 82.0 01/20/2020     MCH 25.5 01/20/2020     MCHC 31.2 01/20/2020     RDW 14.8 01/20/2020     .0 01/20/2020         01/20/2020     K 4.6 01/20/2020      01/20/2020     CO2 25.0 01/20/2020     BUN 20 01/20/2020     CREAT 1/19/2020 1950 Given 3 mL Nebulization     1/19/2020 1543 Given 3 mL Nebulization       Levothyroxine Sodium tab 88 mcg     Date Action Dose Route     1/20/2020 0534 Given 88 mcg Oral       methylPREDNISolone Sodium Succ (Solu-MEDROL) injection 60 mg     D

## 2020-01-21 PROCEDURE — 94640 AIRWAY INHALATION TREATMENT: CPT

## 2020-01-21 NOTE — PROGRESS NOTES
Multidisciplinary Discharge Rounds held 1/21/2020. Treatment team members present today include , , Charge Nurse, Nurse, RT, PT and Pharmacy caring for [facility].      Other care providers present: cards, pulm    Mobility

## 2020-01-21 NOTE — PROGRESS NOTES
[facility] Hospitalist Progress Note     Antoinedeven Dooley Patient Status:  Inpatient    1955 MRN [id number]   [facility] 5NW-A Attending Bri Watkins MD   Hosp Day # 3 PCP Sharon Dawkins MD     CC: follow up    SUBJECTIVE:  Feels HCl, ondansetron HCl       Microbiology:    Hospital Encounter on 01/18/20   1.  BLOOD CULTURE     Status: None (Preliminary result)    Collection Time: 01/18/20  1:32 PM   Result Value Ref Range    Blood Culture Result No Growth 2 Days N/A          Assessm

## 2020-01-21 NOTE — PROGRESS NOTES
[address] [place] [address] Patient Status:  Inpatient    1955 MRN [id number]   [facility] 5NW-A Attending Corey Dandy,*   Hosp Day # 3 PCP Sharon Dawkins MD     SUBJECTIVE: Pt complains of persistent dyspn Sodium Succ (Solu-MEDROL) injection 60 mg, 60 mg, Intravenous, Q8H  •  ipratropium-albuterol (DUONEB) nebulizer solution 3 mL, 3 mL, Nebulization, 6 times per day      Physical Exam:                          General: alert, cooperative, in NAD

## 2020-01-21 NOTE — PLAN OF CARE
Problem: Patient/Family Goals  Goal: Patient/Family Long Term Goal  Description  Patient's Long Term Goal: to discharge home    Interventions:  - IV steroids  - See additional Care Plan goals for specific interventions   Outcome: Progressing  Goal: Patie changes in mentation and behavior  - Position to facilitate oxygenation and minimize respiratory effort  - Oxygen supplementation based on oxygen saturation or ABGs  - Provide Smoking Cessation handout, if applicable  - Encourage broncho-pulmonary hygiene

## 2020-01-21 NOTE — PROGRESS NOTES
[facility] Cardiology Progress Note        Amanda Cortez Patient Status:  Inpatient    1955 MRN [id number]   [facility] 5NW-A Attending Joseph Donnelly,*   Hosp Day # 3 PCP Seng Jameson MD 72  Resp: 20  BP: 139/79  General:  Appears comfortable  HEENT: No focal deficits. Neck: No JVD, carotids 2+ no bruits. Cardiac: Regular S1S2. No S3, S4, rub, click. No murmur. Lungs: Clear to auscultation and percussion. Abdomen: Soft, non-tender. (spontaneous coronary artery dissection) involving OM in 2011 and diagonal artery in 1/2019      4. Paroxysmal Atrial Fibrillation noted 7/2013 with spontaneous conversion to SR.  NSR currently.             Plan:     Will start eliquis for hx of  Atrial Fib

## 2020-01-22 PROCEDURE — 94640 AIRWAY INHALATION TREATMENT: CPT

## 2020-01-22 RX ORDER — BENZONATATE 200 MG/1
200 CAPSULE ORAL 3 TIMES DAILY
Status: DISCONTINUED | OUTPATIENT
Start: 2020-01-22 | End: 2020-01-24

## 2020-01-22 NOTE — PROGRESS NOTES
Pulmonary Progress Note        NAME: Diamond Stafford - ROOM: [facility] MRN: [id number] - Age: 62year old - : 1955        Last 24hrs: No events overnight, feeling a little better, still w/ frequent/persistent coughing    OBJECTIVE:   20  20 10.5*   MCV 82.0   .0       Recent Labs     01/19/20  1252 01/20/20  0630   * 137   K 3.8 4.6    105   CO2 24.0 25.0   BUN 19* 20*   CA 9.4 9.0       No results for input(s): ALT, AST, ALB, AMYLASE, LIPASE, LDH in the last 72 hours.     I biotin serum concentrations >100 ng/mL. It is recommended that physicians ask all patients who may be on biotin supplementation to stop biotin consumption at least 72 hours prior to collection of a new sample.    04/25/2018 04:10 PM 3.120 0.350 - 5.500 uIU/

## 2020-01-22 NOTE — PROGRESS NOTES
[facility] [address] [place] [address] Cardiology Progress Note        Antoinedeven Dooley Patient Status:  Inpatient    1955 MRN [id number]   [facility] 5NW-A Attending Corey Dandy,*   Hosp Day # 4 PCP Sharon Dawkins MD (36.7 °C)  Pulse: 71  Resp: 20  BP: 141/74  General:  Appears comfortable  HEENT: No focal deficits. Neck: No JVD, carotids 2+ no bruits. Cardiac: Regular S1S2. No S3, S4, rub, click. No murmur. Lungs: + wheezes and rhonchi  Abdomen: Soft, non-tender. SCAD (spontaneous coronary artery dissection) involving OM in 2011 and diagonal artery in 1/2019      4.  Paroxysmal Atrial Fibrillation noted 7/2013 with spontaneous conversion to SR.  NSR currently.             Plan:    Continue eliquis for hx of  Atrial

## 2020-01-22 NOTE — PLAN OF CARE
Pt a&ox4. VSS. Complaining of back/neck pain, tramadol given. , on ra maintaining O2 sats. Walked on ra and maintained sats >90%. Nebs. Tessalon added for cough. Solumedrol. On oral abx. Tele, NSR. Hx of afib, on Eliquis.  Tolerating diet with no compla ADULT  Goal: Absence of fever/infection during anticipated neutropenic period  Description  INTERVENTIONS  - Monitor WBC  - Administer growth factors as ordered  - Implement neutropenic guidelines  Outcome: Progressing     Problem: DISCHARGE PLANNING  Goal

## 2020-01-22 NOTE — PROGRESS NOTES
[facility] Hospitalist Progress Note     Dariana Carvalho Patient Status:  Inpatient    1955 MRN [id number]   [facility] 5NW-A Attending Lupe Flores MD   Hosp Day # 4 PCP Paolo Lucas MD     CC: follow up    SUBJECTIVE:  Nakul Ward Medication:  PRN Medication:traZODone, Butalbital-APAP-Caffeine, famoTIDine, guaiFENesin-codeine, traMADol HCl, ondansetron HCl       Microbiology:    Hospital Encounter on 01/18/20   1.  BLOOD CULTURE     Status: None (Preliminary result)    Kaiden Archibald

## 2020-01-22 NOTE — PAYOR COMM NOTE
REF:[id number]   CLINICALS FROM 1/18/20-1/20/20 FAXED ON 1/20/20- FAXING CLINICAL UPDATE FOR 1/21/20-1/22/20 1/21/20  SUBJECTIVE: Pt complains of persistent dyspnea on exertion, had to stop multiple times during her walk around the halls.   Continues to ipratropium-albuterol (DUONEB) nebulizer solution 3 mL, 3 mL, Nebulization, 6 times per day      Physical Exam:                          General: alert, cooperative, in NAD                          HEENT: oropharynx clear without erythema or exudates, mois Continuous  Oximetry Probe Site Changed: Yes  Pulse Ox Probe Location: Left hand                    Intake/Output Summary (Last 24 hours) at 1/22/2020 1047  Last data filed at 1/22/2020 1000      Gross per 24 hour   Intake 260 ml   Output —   Net 260 ml discharge      MEDICATIONS ADMINISTERED IN LAST 1 DAY:  Amoxicillin-Pot Clavulanate (AUGMENTIN) 875-125 MG tab 1 tablet       Date Action Dose Route     1/22/2020 0859 Given 1 tablet Oral     1/21/2020 2039 Given 1 tablet Oral           apixaban (ELIQUIS) 2039 Given 10 mg Oral           Calcium Carbonate-Vitamin D 250-125 MG-UNIT per tab TABS 2 tablet       Date Action Dose Route     1/22/2020 0900 Given 2 tablet Oral           Sertraline HCl (ZOLOFT) tab 100 mg       Date Action Dose Route     1/22/2020 08

## 2020-01-22 NOTE — PLAN OF CARE
Problem: bronchospasm, pneumonia  Data: Patient alert and oriented overnight, on room air maintaining saturation >90%, non-productive cough noted. Patient ambulating in room and in hallway, denies shortness of breath, voiding freely, denies pain.  Vital sig any respiratory difficulty  - Respiratory Therapy support as indicated  - Manage/alleviate anxiety  - Monitor for signs/symptoms of CO2 retention  Outcome: Progressing

## 2020-01-23 PROCEDURE — 94640 AIRWAY INHALATION TREATMENT: CPT

## 2020-01-23 RX ORDER — BENZONATATE 200 MG/1
200 CAPSULE ORAL 3 TIMES DAILY PRN
Qty: 30 CAPSULE | Refills: 0 | Status: SHIPPED | OUTPATIENT
Start: 2020-01-23 | End: 2020-05-10 | Stop reason: ALTCHOICE

## 2020-01-23 RX ORDER — IPRATROPIUM BROMIDE AND ALBUTEROL SULFATE 2.5; .5 MG/3ML; MG/3ML
3 SOLUTION RESPIRATORY (INHALATION)
Status: DISCONTINUED | OUTPATIENT
Start: 2020-01-23 | End: 2020-01-24

## 2020-01-23 RX ORDER — PREDNISONE 20 MG/1
40 TABLET ORAL
Status: DISCONTINUED | OUTPATIENT
Start: 2020-01-24 | End: 2020-01-24

## 2020-01-23 NOTE — PROGRESS NOTES
[facility] Cardiology Progress Note        German Lacks Patient Status:  Inpatient    1955 MRN [id number]   [facility] 5NW-A Attending Aman Hamilton,*   Hosp Day # 5 PCP Danuta Avila MD 134/77      Temp: 97.8 °F (36.6 °C)  Pulse: 69  Resp: 18  BP: 134/77  General:  Appears comfortable  HEENT: No focal deficits. Neck: No JVD, carotids 2+ no bruits. Cardiac: Regular S1S2. No S3, S4, rub, click. No murmur.   Lungs: + wheezes and rhonchi PNA.      2. hx of recurrent SCAD (spontaneous coronary artery dissection) involving OM in 2011 and diagonal artery in 1/2019      4.  Paroxysmal Atrial Fibrillation noted 7/2013 with spontaneous conversion to SR.  NSR currently.             Plan:    Contin

## 2020-01-23 NOTE — PLAN OF CARE
Problem: bronchospasm, pneumonia  Data: Patient alert and oriented overnight, on room air while awake and [id number] O2 per NC to maintain saturation >90%, non-productive cough noted.  Patient ambulating in room and in hallway, denies shortness of breath, vo cough, deep breathe, Incentive Spirometry  - Assess the need for suctioning and perform as needed  - Assess and instruct to report SOB or any respiratory difficulty  - Respiratory Therapy support as indicated  - Manage/alleviate anxiety  - Monitor for sign

## 2020-01-23 NOTE — PROGRESS NOTES
[facility] Hospitalist Progress Note     Rima Cea Patient Status:  Inpatient    1955 MRN [id number]   [facility] 5NW-A Attending Hiwot Delgado MD   Hosp Day # 5 PCP Jeison Schwartz MD     CC: follow up    SUBJECTIVE:  Cough 240 mg Oral Daily     Continuous Infusing Medication:  PRN Medication:traZODone, Butalbital-APAP-Caffeine, famoTIDine, guaiFENesin-codeine, traMADol HCl, ondansetron HCl       Microbiology:    Hospital Encounter on 01/18/20   1.  BLOOD CULTURE     Status: N

## 2020-01-23 NOTE — CM/SW NOTE
Care Progression Note:  Active Acute Medical Issue:   Cont scheduled nebs, iv steroids, tessalon  Weaned off o2. Other Contributing Medical Factors/Dx. :   Viral URI [J06.9]  Bronchospasm [J98.01]  Dyspnea, unspecified type [R06.00]  Afib    Length of st

## 2020-01-23 NOTE — PROGRESS NOTES
Pulmonary Progress Note     Assessment / Plan:  1.  SOB/cough: 2/2 bronchospasm in the setting of +RSV, less likely bacterial pna  -will continue IV steroids to po  -BD protocol  -PCT normal  -CTA without focal infiltrate, has been on augmentin (started 1/1

## 2020-01-24 VITALS
TEMPERATURE: 98 F | OXYGEN SATURATION: 94 % | HEART RATE: 77 BPM | BODY MASS INDEX: 27.52 KG/M2 | SYSTOLIC BLOOD PRESSURE: 128 MMHG | RESPIRATION RATE: 20 BRPM | WEIGHT: 175.31 LBS | HEIGHT: 67 IN | DIASTOLIC BLOOD PRESSURE: 72 MMHG

## 2020-01-24 PROCEDURE — 94640 AIRWAY INHALATION TREATMENT: CPT

## 2020-01-24 RX ORDER — PREDNISONE 10 MG/1
TABLET ORAL
Qty: 42 TABLET | Refills: 0 | Status: SHIPPED | OUTPATIENT
Start: 2020-01-24 | End: 2020-02-18 | Stop reason: ALTCHOICE

## 2020-01-24 RX ORDER — CODEINE PHOSPHATE AND GUAIFENESIN 10; 100 MG/5ML; MG/5ML
5 SOLUTION ORAL 3 TIMES DAILY PRN
Qty: 100 ML | Refills: 0 | Status: SHIPPED | OUTPATIENT
Start: 2020-01-24 | End: 2020-02-18 | Stop reason: ALTCHOICE

## 2020-01-24 RX ORDER — CODEINE PHOSPHATE AND GUAIFENESIN 10; 100 MG/5ML; MG/5ML
5 SOLUTION ORAL EVERY 6 HOURS PRN
Qty: 100 ML | Refills: 0 | Status: SHIPPED | OUTPATIENT
Start: 2020-01-24 | End: 2020-01-24

## 2020-01-24 NOTE — PROGRESS NOTES
NURSING DISCHARGE NOTE    Discharged Home via Wheelchair. Accompanied by Family member  Belongings Taken by patient/family. Patient vitals stable and discharged via wheelchair to [facility].

## 2020-01-24 NOTE — PLAN OF CARE
Problem: Patient/Family Goals  Goal: Patient/Family Long Term Goal  Description  Patient's Long Term Goal: to discharge home    Interventions:  - IV steroids  - See additional Care Plan goals for specific interventions   Outcome: Progressing  Goal: Patie related to functional status, cognitive ability or social support system  Outcome: Progressing     Problem: RESPIRATORY - ADULT  Goal: Achieves optimal ventilation and oxygenation  Description  INTERVENTIONS:  - Assess for changes in respiratory status  - discharge in afternoon.

## 2020-01-24 NOTE — PROGRESS NOTES
[facility] Cardiology Progress Note        Sarah Leblanc Patient Status:  Inpatient    1955 MRN [id number]   [facility] 5NW-A Attending Lokesh Lewis,*   Hosp Day # 6 PCP Margot Manzano MD deficits. Neck: No JVD, carotids 2+ no bruits. Cardiac: Regular S1S2. No S3, S4, rub, click. No murmur. Lungs: clear  Abdomen: Soft, non-tender. Extremities: No LE edema. No clubbing or cyanosis. Neurologic: Alert and oriented, normal affect.   Amaris Greene Paroxysmal Atrial Fibrillation noted 7/2013 with spontaneous conversion to SR.  NSR currently.             Plan:    Continue eliquis for hx of  Atrial Fibrillation (occurred 7 years ago.   No clinical episodes since then),  given elevated CHADS score , but

## 2020-01-24 NOTE — PAYOR COMM NOTE
REF: [id number]   APPROVED 1/18/20-1/23/20- [facility] CLINICAL UPDATE FOR 1/23/20- REQUESTING 1 ADDITIONAL DAY    1/23/20  SUBJECTIVE:  Coughing.  Feeling very tired after ambulation.      OBJECTIVE:  Temp:  [97.4 °F (36.3 °C)-98.3 °F (36.8 °C)] 98.1 °F (36.7 without signs of overload.   - CXR with improvement in PNA  - IV steroids given in ED -remains on IV steroids/nebs. Improving- transition to PO steroids.    - cont OP augmentin to complete course as previously advised  - wean O2 as able   - CTA chest no PE, guaiFENesin-codeine (ROBITUSSIN AC) 100-10 MG/5ML syrup 5 mL       Date Action Dose Route     1/24/2020 0821 Given 5 mL Oral     1/23/2020 2120 Given 5 mL Oral           ipratropium-albuterol (DUONEB) nebulizer solution 3 mL       Date Action Dose Rout

## 2020-01-24 NOTE — DISCHARGE SUMMARY
General Medicine Discharge Summary     Patient ID:  Vairnder Chatterjee  62year old  9/26/1955    Admit date: 1/18/2020    Discharge date and time: 1/24/2020    Attending Physician: Eula Flaherty MD     [facility] improvement in PNA  - IV steroids given in ED -transitioned to oral steroids  - cont OP augmentin to complete course as previously advised  - wean O2 as able   - CTA chest no PE, no PNA     # Chest pain  - Seems to be 2/2 cough, MSK  - CTA chest OK  - EKG radiology staff at approximately 1500 hours on January 15th 2020.     Dictated by: Venita Hernandez MD on 1/15/2020 at 14:58     Approved by: Venita Hernandez MD on 1/15/2020 at 14:59          Ct Angiography, Chest (cpt=71275)    Result Date: 1/19/2020  PROCEDURE:  CT AP PORTABLE  (CPT=71045)  TECHNIQUE:  AP chest radiograph was obtained. COMPARISON:  [facility] , XR, XR CHEST PA + LAT CHEST (CPT=71046), 1/15/2020, 14:10. [facility] , XR, XR CHEST PA + LAT CHEST (CPT=71046), 1/20/2019, 10:53.   INDICATIONS:  CINDY  PATIENT STATE for Heartburn. Calcium Carbonate-Vitamin D 600-400 MG-UNIT Oral Tab  Take 1 tablet by mouth daily. Metoprolol Succinate ER 25 MG Oral Tablet 24 Hr  Take 1 tablet (25 mg total) by mouth daily.     traMADol HCl 50 MG Oral Tab  Take 1 tablet (50 mg total

## 2020-01-24 NOTE — PLAN OF CARE
Problem: Patient/Family Goals  Goal: Patient/Family Long Term Goal  Description  Patient's Long Term Goal: to discharge home    Interventions:  - IV steroids  - See additional Care Plan goals for specific interventions   Outcome: Progressing  Goal: Patie cognitive ability or social support system  Outcome: Progressing     Problem: RESPIRATORY - ADULT  Goal: Achieves optimal ventilation and oxygenation  Description  INTERVENTIONS:  - Assess for changes in respiratory status  - Assess for changes in [facility]

## 2020-02-03 NOTE — CDS QUERY
Physician’s Documentation Request: History of   By submitting this query, we are merely seeking further clarification of documentation. Please utilize your independent clinical judgment when addressing the question(s) below.     Dear Doctor, Noted document

## 2020-04-17 ENCOUNTER — APPOINTMENT (OUTPATIENT)
Dept: CT IMAGING | Facility: HOSPITAL | Age: 65
End: 2020-04-17
Attending: EMERGENCY MEDICINE
Payer: COMMERCIAL

## 2020-04-17 ENCOUNTER — HOSPITAL ENCOUNTER (EMERGENCY)
Facility: HOSPITAL | Age: 65
Discharge: HOME | End: 2020-04-17
Attending: EMERGENCY MEDICINE
Payer: COMMERCIAL

## 2020-04-17 ENCOUNTER — APPOINTMENT (OUTPATIENT)
Dept: GENERAL RADIOLOGY | Facility: HOSPITAL | Age: 65
End: 2020-04-17
Attending: EMERGENCY MEDICINE
Payer: COMMERCIAL

## 2020-04-17 VITALS
BODY MASS INDEX: 27.47 KG/M2 | HEIGHT: 67 IN | RESPIRATION RATE: 18 BRPM | TEMPERATURE: 98 F | DIASTOLIC BLOOD PRESSURE: 83 MMHG | SYSTOLIC BLOOD PRESSURE: 108 MMHG | OXYGEN SATURATION: 96 % | HEART RATE: 74 BPM | WEIGHT: 175 LBS

## 2020-04-17 DIAGNOSIS — N30.00 ACUTE CYSTITIS WITHOUT HEMATURIA: ICD-10-CM

## 2020-04-17 DIAGNOSIS — R07.9 CHEST PAIN OF UNCERTAIN ETIOLOGY: Primary | ICD-10-CM

## 2020-04-17 PROCEDURE — 87186 SC STD MICRODIL/AGAR DIL: CPT | Performed by: EMERGENCY MEDICINE

## 2020-04-17 PROCEDURE — 96365 THER/PROPH/DIAG IV INF INIT: CPT

## 2020-04-17 PROCEDURE — 87088 URINE BACTERIA CULTURE: CPT | Performed by: EMERGENCY MEDICINE

## 2020-04-17 PROCEDURE — 99285 EMERGENCY DEPT VISIT HI MDM: CPT

## 2020-04-17 PROCEDURE — 85379 FIBRIN DEGRADATION QUANT: CPT | Performed by: EMERGENCY MEDICINE

## 2020-04-17 PROCEDURE — 93010 ELECTROCARDIOGRAM REPORT: CPT

## 2020-04-17 PROCEDURE — 87086 URINE CULTURE/COLONY COUNT: CPT | Performed by: EMERGENCY MEDICINE

## 2020-04-17 PROCEDURE — 84484 ASSAY OF TROPONIN QUANT: CPT | Performed by: EMERGENCY MEDICINE

## 2020-04-17 PROCEDURE — 81001 URINALYSIS AUTO W/SCOPE: CPT | Performed by: EMERGENCY MEDICINE

## 2020-04-17 PROCEDURE — 80053 COMPREHEN METABOLIC PANEL: CPT | Performed by: EMERGENCY MEDICINE

## 2020-04-17 PROCEDURE — 71045 X-RAY EXAM CHEST 1 VIEW: CPT | Performed by: EMERGENCY MEDICINE

## 2020-04-17 PROCEDURE — 93005 ELECTROCARDIOGRAM TRACING: CPT

## 2020-04-17 PROCEDURE — 96375 TX/PRO/DX INJ NEW DRUG ADDON: CPT

## 2020-04-17 PROCEDURE — 71275 CT ANGIOGRAPHY CHEST: CPT | Performed by: EMERGENCY MEDICINE

## 2020-04-17 PROCEDURE — 85025 COMPLETE CBC W/AUTO DIFF WBC: CPT | Performed by: EMERGENCY MEDICINE

## 2020-04-17 RX ORDER — MORPHINE SULFATE 4 MG/ML
4 INJECTION, SOLUTION INTRAMUSCULAR; INTRAVENOUS ONCE
Status: COMPLETED | OUTPATIENT
Start: 2020-04-17 | End: 2020-04-17

## 2020-04-17 RX ORDER — ACETAMINOPHEN 500 MG
1000 TABLET ORAL ONCE
Status: COMPLETED | OUTPATIENT
Start: 2020-04-17 | End: 2020-04-17

## 2020-04-17 RX ORDER — NITROFURANTOIN 25; 75 MG/1; MG/1
100 CAPSULE ORAL 2 TIMES DAILY
Qty: 20 CAPSULE | Refills: 0 | Status: SHIPPED | OUTPATIENT
Start: 2020-04-17 | End: 2020-04-27

## 2020-04-17 NOTE — ED PROVIDER NOTES
Patient Seen in: [facility] Emergency Department      History   Patient presents with:  Back Pain    Stated Complaint: Shoulder Pain     HPI    61-year-old white female who presents to the emergency room today for complaint of shoulder pain.   [place]sel[place] Procedure Laterality Date   • ANGIOGRAM  7/31/11    OM spasm   • APPENDECTOMY     • BLADDER TRANSVAGINAL TAPING SUSPENSION URETHROLYSIS N/A 8/26/2019    Performed by Eleanor Mittal MD at [facility] MAIN OR   • COLONOSCOPY     • COLONOSCOPY  4/12/19= Terry Mcpherson systems are as noted in HPI. Constitutional and vital signs reviewed. All other systems reviewed and negative except as noted above.     Physical Exam     ED Triage Vitals [04/17/20 0534]   /90   Pulse 80   Resp 18   Temp 97.9 °F (36.6 °C)   Tem Absolute 0.98 (*)     All other components within normal limits   TROPONIN I - Normal   TROPONIN I - Normal   CBC WITH DIFFERENTIAL WITH PLATELET    Narrative: The following orders were created for panel order CBC WITH DIFFERENTIAL WITH PLATELET.   Proc WALL:  No mass or axillary adenopathy. LIMITED ABDOMEN:  There is probable cyst at the dome of the liver measuring 1.6 cm. There is a large sliding-type hiatal hernia which is stable. BONES:  No bony lesion or fracture.     OTHER:  Multinodular goiter Clinical Impression:  Chest pain of uncertain etiology  (primary encounter diagnosis)  Acute cystitis without hematuria    Disposition:  Discharge  4/17/2020 10:03 am    Follow-up:  Brianne Rodríguez, [address] [place] [address]

## 2020-04-17 NOTE — CONSULTS
[facility] Cardiology  Consultation Note      Diamond Stafford Patient Status:  Emergency    1955 MRN [id number]   Location [address] Attending No att. providers found   Hosp Day # 0 PCP MATTY Lincoln quite dissimilar, sub-sternal/L sided chest pain, radiating to her arm/nausea. She is not experiencing any of these symptoms currently. Cardiology consultation was requested.     Medications:  No current facility-administered medications for this enco HISTORY      DENTAL   • OTHER SURGICAL HISTORY      CERVICAL HNP SURGERY   • OTHER SURGICAL HISTORY      R SHOULDER RCR   • OTHER SURGICAL HISTORY      R SHOULDER revision RCR   • OTHER SURGICAL HISTORY      B LARGE TOENAIL REMOVAL DUE TO SEVERE ONYCHOMYCO headaches  Endocrine: negative for temperature intolerance      Physical Exam:  Blood pressure 108/83, pulse 74, temperature 97.9 °F (36.6 °C), temperature source Temporal, resp.  rate 18, height 5' 7\" (1.702 m), weight 175 lb (79.4 kg), last menstrual per to contact me if you have any questions.     Dino Pressley MD  4/17/2020  1:00 PM

## 2020-04-17 NOTE — ED INITIAL ASSESSMENT (HPI)
Patient here with c/o mid upper back pain that runs up towards the neck that stated 3-4 hours PTA. Patient reports she was laying in bed when the pain started. Patient also reports pain is worse when taking a deep breath.

## 2020-05-10 ENCOUNTER — APPOINTMENT (OUTPATIENT)
Dept: ULTRASOUND IMAGING | Facility: HOSPITAL | Age: 65
End: 2020-05-10
Attending: EMERGENCY MEDICINE
Payer: COMMERCIAL

## 2020-05-10 ENCOUNTER — HOSPITAL ENCOUNTER (EMERGENCY)
Facility: HOSPITAL | Age: 65
Discharge: HOME | End: 2020-05-10
Attending: EMERGENCY MEDICINE
Payer: COMMERCIAL

## 2020-05-10 ENCOUNTER — APPOINTMENT (OUTPATIENT)
Dept: GENERAL RADIOLOGY | Facility: HOSPITAL | Age: 65
End: 2020-05-10
Attending: EMERGENCY MEDICINE
Payer: COMMERCIAL

## 2020-05-10 VITALS
TEMPERATURE: 99 F | HEIGHT: 67 IN | BODY MASS INDEX: 25.9 KG/M2 | HEART RATE: 61 BPM | DIASTOLIC BLOOD PRESSURE: 82 MMHG | WEIGHT: 165 LBS | SYSTOLIC BLOOD PRESSURE: 122 MMHG | OXYGEN SATURATION: 95 % | RESPIRATION RATE: 16 BRPM

## 2020-05-10 DIAGNOSIS — R60.9 PERIPHERAL EDEMA: Primary | ICD-10-CM

## 2020-05-10 PROCEDURE — 99284 EMERGENCY DEPT VISIT MOD MDM: CPT

## 2020-05-10 PROCEDURE — 93970 EXTREMITY STUDY: CPT | Performed by: EMERGENCY MEDICINE

## 2020-05-10 PROCEDURE — 71045 X-RAY EXAM CHEST 1 VIEW: CPT | Performed by: EMERGENCY MEDICINE

## 2020-05-10 PROCEDURE — 85025 COMPLETE CBC W/AUTO DIFF WBC: CPT | Performed by: EMERGENCY MEDICINE

## 2020-05-10 PROCEDURE — 93010 ELECTROCARDIOGRAM REPORT: CPT

## 2020-05-10 PROCEDURE — 93005 ELECTROCARDIOGRAM TRACING: CPT

## 2020-05-10 PROCEDURE — 99285 EMERGENCY DEPT VISIT HI MDM: CPT

## 2020-05-10 PROCEDURE — 80053 COMPREHEN METABOLIC PANEL: CPT | Performed by: EMERGENCY MEDICINE

## 2020-05-10 PROCEDURE — 36415 COLL VENOUS BLD VENIPUNCTURE: CPT

## 2020-05-10 PROCEDURE — 83880 ASSAY OF NATRIURETIC PEPTIDE: CPT | Performed by: EMERGENCY MEDICINE

## 2020-05-10 NOTE — ED PROVIDER NOTES
Patient Seen in: [facility] Emergency Department      History   Patient presents with:  Swelling Edema    Stated Complaint: Swelling since yesterday to BLE    HPI    61-year-old female presents with swelling in bilateral lower extremities.   She stat TRANSVAGINAL TAPING SUSPENSION URETHROLYSIS N/A 8/26/2019    Performed by Birgit Albright MD at [facility]   • COLONOSCOPY     • COLONOSCOPY  4/12/19= Diverticulosis    Repeat 2029   • COLONOSCOPY N/A 4/12/2019    Performed by Andrew Grant MD at [facility] All other systems reviewed and negative except as noted above.     Physical Exam     ED Triage Vitals [05/10/20 1323]   /80   Pulse 69   Resp 18   Temp    Temp src    SpO2 95 %   O2 Device None (Room air)       Current:/80   Pulse 69   Resp Us Venous Doppler Leg Bilat - Diag Img (cpt=93970)    Result Date: 5/10/2020  PROCEDURE:  US VENOUS DOPPLER LEG BILAT - DIAG IMG (CPT=93970)  COMPARISON:  US DEJAH, US VENOUS DOPPLER LEG LEFT - DIAG IMG (CPT=93971), 2/15/2019, 11:48 PM.  INDICATIONS: clear.  Mediastinal and hilar contours are normal.  No focal consolidation. A probable hiatal hernia is present. This is better appreciate on recent CT. CONCLUSION:  No acute abnormality is seen.   If clinical symptoms persist consider followup radiograp

## 2020-05-17 ENCOUNTER — APPOINTMENT (OUTPATIENT)
Dept: CT IMAGING | Facility: HOSPITAL | Age: 65
End: 2020-05-17
Attending: EMERGENCY MEDICINE
Payer: COMMERCIAL

## 2020-05-17 ENCOUNTER — HOSPITAL ENCOUNTER (EMERGENCY)
Facility: HOSPITAL | Age: 65
Discharge: HOME | End: 2020-05-17
Attending: EMERGENCY MEDICINE
Payer: COMMERCIAL

## 2020-05-17 VITALS
HEART RATE: 88 BPM | RESPIRATION RATE: 16 BRPM | TEMPERATURE: 98 F | DIASTOLIC BLOOD PRESSURE: 72 MMHG | SYSTOLIC BLOOD PRESSURE: 124 MMHG | OXYGEN SATURATION: 99 %

## 2020-05-17 DIAGNOSIS — M54.9 BACK PAIN WITHOUT RADIATION: Primary | ICD-10-CM

## 2020-05-17 DIAGNOSIS — N30.00 ACUTE CYSTITIS WITHOUT HEMATURIA: ICD-10-CM

## 2020-05-17 PROCEDURE — 96366 THER/PROPH/DIAG IV INF ADDON: CPT

## 2020-05-17 PROCEDURE — 85025 COMPLETE CBC W/AUTO DIFF WBC: CPT | Performed by: EMERGENCY MEDICINE

## 2020-05-17 PROCEDURE — 81001 URINALYSIS AUTO W/SCOPE: CPT | Performed by: EMERGENCY MEDICINE

## 2020-05-17 PROCEDURE — 83690 ASSAY OF LIPASE: CPT | Performed by: EMERGENCY MEDICINE

## 2020-05-17 PROCEDURE — 96365 THER/PROPH/DIAG IV INF INIT: CPT

## 2020-05-17 PROCEDURE — 87186 SC STD MICRODIL/AGAR DIL: CPT | Performed by: EMERGENCY MEDICINE

## 2020-05-17 PROCEDURE — 99284 EMERGENCY DEPT VISIT MOD MDM: CPT

## 2020-05-17 PROCEDURE — 96361 HYDRATE IV INFUSION ADD-ON: CPT

## 2020-05-17 PROCEDURE — 87086 URINE CULTURE/COLONY COUNT: CPT | Performed by: EMERGENCY MEDICINE

## 2020-05-17 PROCEDURE — 87077 CULTURE AEROBIC IDENTIFY: CPT | Performed by: EMERGENCY MEDICINE

## 2020-05-17 PROCEDURE — 96375 TX/PRO/DX INJ NEW DRUG ADDON: CPT

## 2020-05-17 PROCEDURE — 80053 COMPREHEN METABOLIC PANEL: CPT | Performed by: EMERGENCY MEDICINE

## 2020-05-17 PROCEDURE — 74177 CT ABD & PELVIS W/CONTRAST: CPT | Performed by: EMERGENCY MEDICINE

## 2020-05-17 RX ORDER — KETOROLAC TROMETHAMINE 30 MG/ML
15 INJECTION, SOLUTION INTRAMUSCULAR; INTRAVENOUS ONCE
Status: COMPLETED | OUTPATIENT
Start: 2020-05-17 | End: 2020-05-17

## 2020-05-17 RX ORDER — SODIUM CHLORIDE 9 MG/ML
125 INJECTION, SOLUTION INTRAVENOUS CONTINUOUS
Status: DISCONTINUED | OUTPATIENT
Start: 2020-05-17 | End: 2020-05-17

## 2020-05-17 RX ORDER — HYDROCODONE BITARTRATE AND ACETAMINOPHEN 5; 325 MG/1; MG/1
1-2 TABLET ORAL EVERY 6 HOURS PRN
Qty: 10 TABLET | Refills: 0 | Status: SHIPPED | OUTPATIENT
Start: 2020-05-17 | End: 2020-05-24

## 2020-05-17 RX ORDER — CEPHALEXIN 500 MG/1
500 CAPSULE ORAL 4 TIMES DAILY
Qty: 40 CAPSULE | Refills: 0 | Status: SHIPPED | OUTPATIENT
Start: 2020-05-17 | End: 2020-05-27

## 2020-05-17 NOTE — ED PROVIDER NOTES
Patient Seen in: [facility] Emergency Department      History   Patient presents with:  Back Pain    Stated Complaint: back pain, concerned for UTI    HPI  Patient is a 61-year-old female who states that she had hysterectomy with mesh placement for Surgical History:   Procedure Laterality Date   • ANGIOGRAM  7/31/11    OM spasm   • APPENDECTOMY     • BLADDER TRANSVAGINAL TAPING SUSPENSION URETHROLYSIS N/A 8/26/2019    Performed by Iliana Avery MD at [facility] MAIN OR   • COLONOSCOPY     • COLONOSCOPY 98.4 °F (36.9 °C)   Temp src Tympanic   SpO2 99 %   O2 Device None (Room air)       Current:/72   Pulse 88   Temp 98.4 °F (36.9 °C) (Tympanic)   Resp 16   LMP 09/20/2009   SpO2 99%         Physical Exam  GENERAL: Patient resting comfortably on the ca the individual orders. URINE CULTURE, ROUTINE   CBC W/ DIFFERENTIAL          CT abdomen pelvis1. Mild retroperitoneal and mesenteric lymphadenopathy is stable. 2. No evidence of a new inflammatory process.        3. Hiatal hernia containing most of th

## 2020-05-20 RX ORDER — AMOXICILLIN AND CLAVULANATE POTASSIUM 875; 125 MG/1; MG/1
1 TABLET, FILM COATED ORAL 2 TIMES DAILY
Qty: 20 TABLET | Refills: 0 | Status: SHIPPED | OUTPATIENT
Start: 2020-05-20 | End: 2020-05-30

## 2020-05-20 NOTE — ED PROVIDER NOTES
Received urine culture results positive for enterococcus, patient discharged home on Keflex which has poor activity against enterococcus, patient will be switched to Augmentin twice daily x10 days.

## 2021-02-24 PROBLEM — M35.01 SJOGREN'S SYNDROME WITH KERATOCONJUNCTIVITIS SICCA (HCC): Status: ACTIVE | Noted: 2021-02-24

## 2021-02-24 PROBLEM — M19.049 HAND ARTHRITIS: Status: ACTIVE | Noted: 2021-02-24

## 2021-03-13 ENCOUNTER — HOSPITAL ENCOUNTER (EMERGENCY)
Facility: HOSPITAL | Age: 66
Discharge: HOME | End: 2021-03-13
Attending: EMERGENCY MEDICINE
Payer: COMMERCIAL

## 2021-03-13 ENCOUNTER — APPOINTMENT (OUTPATIENT)
Dept: CT IMAGING | Facility: HOSPITAL | Age: 66
End: 2021-03-13
Attending: EMERGENCY MEDICINE
Payer: COMMERCIAL

## 2021-03-13 VITALS
WEIGHT: 170 LBS | HEART RATE: 88 BPM | DIASTOLIC BLOOD PRESSURE: 81 MMHG | OXYGEN SATURATION: 98 % | BODY MASS INDEX: 26.68 KG/M2 | SYSTOLIC BLOOD PRESSURE: 132 MMHG | HEIGHT: 67 IN | TEMPERATURE: 97 F | RESPIRATION RATE: 16 BRPM

## 2021-03-13 DIAGNOSIS — K59.00 CONSTIPATION, UNSPECIFIED CONSTIPATION TYPE: ICD-10-CM

## 2021-03-13 DIAGNOSIS — N39.0 URINARY TRACT INFECTION WITHOUT HEMATURIA, SITE UNSPECIFIED: Primary | ICD-10-CM

## 2021-03-13 DIAGNOSIS — K52.9 COLITIS: ICD-10-CM

## 2021-03-13 LAB
ALBUMIN SERPL-MCNC: 3.8 G/DL (ref 3.4–5)
ALBUMIN/GLOB SERPL: 1 {RATIO} (ref 1–2)
ALP LIVER SERPL-CCNC: 116 U/L
ALT SERPL-CCNC: 30 U/L
ANION GAP SERPL CALC-SCNC: 5 MMOL/L (ref 0–18)
AST SERPL-CCNC: 20 U/L (ref 15–37)
BASOPHILS # BLD AUTO: 0.05 X10(3) UL (ref 0–0.2)
BASOPHILS NFR BLD AUTO: 0.4 %
BILIRUB SERPL-MCNC: 0.3 MG/DL (ref 0.1–2)
BILIRUB UR QL STRIP.AUTO: NEGATIVE
BUN BLD-MCNC: 19 MG/DL (ref 7–18)
BUN/CREAT SERPL: 21.6 (ref 10–20)
CALCIUM BLD-MCNC: 8.8 MG/DL (ref 8.5–10.1)
CHLORIDE SERPL-SCNC: 103 MMOL/L (ref 98–112)
CO2 SERPL-SCNC: 27 MMOL/L (ref 21–32)
COLOR UR AUTO: YELLOW
CREAT BLD-MCNC: 0.88 MG/DL
DEPRECATED RDW RBC AUTO: 38.5 FL (ref 35.1–46.3)
EOSINOPHIL # BLD AUTO: 0.33 X10(3) UL (ref 0–0.7)
EOSINOPHIL NFR BLD AUTO: 2.7 %
ERYTHROCYTE [DISTWIDTH] IN BLOOD BY AUTOMATED COUNT: 12.6 % (ref 11–15)
GLOBULIN PLAS-MCNC: 4 G/DL (ref 2.8–4.4)
GLUCOSE BLD-MCNC: 109 MG/DL (ref 70–99)
GLUCOSE UR STRIP.AUTO-MCNC: NEGATIVE MG/DL
HCT VFR BLD AUTO: 35.5 %
HGB BLD-MCNC: 11.8 G/DL
IMM GRANULOCYTES # BLD AUTO: 0.03 X10(3) UL (ref 0–1)
IMM GRANULOCYTES NFR BLD: 0.2 %
KETONES UR STRIP.AUTO-MCNC: NEGATIVE MG/DL
LYMPHOCYTES # BLD AUTO: 1.4 X10(3) UL (ref 1–4)
LYMPHOCYTES NFR BLD AUTO: 11.5 %
M PROTEIN MFR SERPL ELPH: 7.8 G/DL (ref 6.4–8.2)
MCH RBC QN AUTO: 27.8 PG (ref 26–34)
MCHC RBC AUTO-ENTMCNC: 33.2 G/DL (ref 31–37)
MCV RBC AUTO: 83.5 FL
MONOCYTES # BLD AUTO: 0.75 X10(3) UL (ref 0.1–1)
MONOCYTES NFR BLD AUTO: 6.2 %
NEUTROPHILS # BLD AUTO: 9.57 X10 (3) UL (ref 1.5–7.7)
NEUTROPHILS # BLD AUTO: 9.57 X10(3) UL (ref 1.5–7.7)
NEUTROPHILS NFR BLD AUTO: 79 %
NITRITE UR QL STRIP.AUTO: POSITIVE
OSMOLALITY SERPL CALC.SUM OF ELEC: 283 MOSM/KG (ref 275–295)
PH UR STRIP.AUTO: 7 [PH] (ref 5–8)
PLATELET # BLD AUTO: 233 10(3)UL (ref 150–450)
POTASSIUM SERPL-SCNC: 3.9 MMOL/L (ref 3.5–5.1)
PROT UR STRIP.AUTO-MCNC: NEGATIVE MG/DL
RBC # BLD AUTO: 4.25 X10(6)UL
RBC UR QL AUTO: NEGATIVE
SODIUM SERPL-SCNC: 135 MMOL/L (ref 136–145)
SP GR UR STRIP.AUTO: 1.01 (ref 1–1.03)
UROBILINOGEN UR STRIP.AUTO-MCNC: <2 MG/DL
WBC # BLD AUTO: 12.1 X10(3) UL (ref 4–11)
WBC #/AREA URNS AUTO: >50 /HPF

## 2021-03-13 PROCEDURE — 99284 EMERGENCY DEPT VISIT MOD MDM: CPT

## 2021-03-13 PROCEDURE — 87088 URINE BACTERIA CULTURE: CPT | Performed by: EMERGENCY MEDICINE

## 2021-03-13 PROCEDURE — 96375 TX/PRO/DX INJ NEW DRUG ADDON: CPT

## 2021-03-13 PROCEDURE — 85025 COMPLETE CBC W/AUTO DIFF WBC: CPT | Performed by: EMERGENCY MEDICINE

## 2021-03-13 PROCEDURE — 87086 URINE CULTURE/COLONY COUNT: CPT | Performed by: EMERGENCY MEDICINE

## 2021-03-13 PROCEDURE — 96365 THER/PROPH/DIAG IV INF INIT: CPT

## 2021-03-13 PROCEDURE — 85025 COMPLETE CBC W/AUTO DIFF WBC: CPT

## 2021-03-13 PROCEDURE — 80053 COMPREHEN METABOLIC PANEL: CPT | Performed by: EMERGENCY MEDICINE

## 2021-03-13 PROCEDURE — 74177 CT ABD & PELVIS W/CONTRAST: CPT | Performed by: EMERGENCY MEDICINE

## 2021-03-13 PROCEDURE — 81001 URINALYSIS AUTO W/SCOPE: CPT | Performed by: EMERGENCY MEDICINE

## 2021-03-13 PROCEDURE — 87186 SC STD MICRODIL/AGAR DIL: CPT | Performed by: EMERGENCY MEDICINE

## 2021-03-13 PROCEDURE — 96376 TX/PRO/DX INJ SAME DRUG ADON: CPT

## 2021-03-13 RX ORDER — AMOXICILLIN AND CLAVULANATE POTASSIUM 875; 125 MG/1; MG/1
1 TABLET, FILM COATED ORAL 2 TIMES DAILY
Qty: 20 TABLET | Refills: 0 | Status: ON HOLD | OUTPATIENT
Start: 2021-03-13 | End: 2021-03-22

## 2021-03-13 RX ORDER — MORPHINE SULFATE 4 MG/ML
4 INJECTION, SOLUTION INTRAMUSCULAR; INTRAVENOUS EVERY 30 MIN PRN
Status: DISCONTINUED | OUTPATIENT
Start: 2021-03-13 | End: 2021-03-13

## 2021-03-13 NOTE — ED INITIAL ASSESSMENT (HPI)
Patient here with c/o LLQ abdominal pain radiating to the back. Patient reports she had a little pain yesterday evening and then the pain woke her up from sleep. Patient denies N/V/D and fever.

## 2021-03-13 NOTE — ED NOTES
DC instructions and ERx handed to pt. No distress noted. Pt denies need for WC out of ER. Pt thanks staff for care.  Nephew driving pt home

## 2021-03-13 NOTE — ED PROVIDER NOTES
Patient Seen in: [facility] Emergency Department      History   Patient presents with:  Back Pain  Abdomen/Flank Pain    Stated Complaint: abdomenal pain and back pain    HPI/Subjective:   HPI    This is a pleasant 61-year-old female, with complaint • COLONOSCOPY  4/12/19= Diverticulosis    Repeat 2029   • COLONOSCOPY N/A 4/12/2019    Performed by Roslyn Castaneda MD at [facility] ENDOSCOPY   • ESOPHAGOGASTRODUODENOSCOPY (EGD) N/A 4/12/2019    Performed by Roslyn Castaneda MD at [facility]. 28. 03/13/21 0421 97.4 °F (36.3 °C)   Temp src 03/13/21 0421 Temporal   SpO2 03/13/21 0421 97 %   O2 Device 03/13/21 0650 None (Room air)       Current:/84   Pulse 82   Temp 97.4 °F (36.3 °C) (Temporal)   Resp 18   Ht 170.2 cm (5' 7\")   Wt 77.1 kg   LMP patient. I believe the patient's constipation is contributory to her colitis.   Serial abdominal exams with patient's abdomen to be soft and nonsurgical.  Patient was given dose of antibiotics looking and reviewing her previous ESBL infection she will be s

## 2021-03-14 ENCOUNTER — HOSPITAL ENCOUNTER (INPATIENT)
Facility: HOSPITAL | Age: 66
LOS: 6 days | Discharge: HOME HEALTH | DRG: 330 | End: 2021-03-22
Attending: EMERGENCY MEDICINE | Admitting: INTERNAL MEDICINE
Payer: COMMERCIAL

## 2021-03-14 DIAGNOSIS — K52.9 COLITIS: ICD-10-CM

## 2021-03-14 DIAGNOSIS — K56.609: ICD-10-CM

## 2021-03-14 DIAGNOSIS — R10.9 INTRACTABLE ABDOMINAL PAIN: Primary | ICD-10-CM

## 2021-03-14 DIAGNOSIS — B02.8 HERPES ZOSTER WITH OTHER COMPLICATION: ICD-10-CM

## 2021-03-14 DIAGNOSIS — K59.00 OBSTIPATION: ICD-10-CM

## 2021-03-14 LAB
ALBUMIN SERPL-MCNC: 3.8 G/DL (ref 3.4–5)
ALBUMIN/GLOB SERPL: 0.9 {RATIO} (ref 1–2)
ALP LIVER SERPL-CCNC: 108 U/L
ALT SERPL-CCNC: 28 U/L
ANION GAP SERPL CALC-SCNC: 4 MMOL/L (ref 0–18)
AST SERPL-CCNC: 23 U/L (ref 15–37)
BASOPHILS # BLD AUTO: 0.04 X10(3) UL (ref 0–0.2)
BASOPHILS NFR BLD AUTO: 0.3 %
BILIRUB SERPL-MCNC: 0.3 MG/DL (ref 0.1–2)
BUN BLD-MCNC: 26 MG/DL (ref 7–18)
BUN/CREAT SERPL: 20.3 (ref 10–20)
CALCIUM BLD-MCNC: 8.8 MG/DL (ref 8.5–10.1)
CHLORIDE SERPL-SCNC: 102 MMOL/L (ref 98–112)
CO2 SERPL-SCNC: 28 MMOL/L (ref 21–32)
CREAT BLD-MCNC: 1.28 MG/DL
DEPRECATED RDW RBC AUTO: 39.2 FL (ref 35.1–46.3)
EOSINOPHIL # BLD AUTO: 0.25 X10(3) UL (ref 0–0.7)
EOSINOPHIL NFR BLD AUTO: 1.7 %
ERYTHROCYTE [DISTWIDTH] IN BLOOD BY AUTOMATED COUNT: 12.8 % (ref 11–15)
GLOBULIN PLAS-MCNC: 4.2 G/DL (ref 2.8–4.4)
GLUCOSE BLD-MCNC: 110 MG/DL (ref 70–99)
HCT VFR BLD AUTO: 35.4 %
HGB BLD-MCNC: 11.7 G/DL
IMM GRANULOCYTES # BLD AUTO: 0.06 X10(3) UL (ref 0–1)
IMM GRANULOCYTES NFR BLD: 0.4 %
LIPASE SERPL-CCNC: 91 U/L (ref 73–393)
LYMPHOCYTES # BLD AUTO: 1.16 X10(3) UL (ref 1–4)
LYMPHOCYTES NFR BLD AUTO: 8.1 %
M PROTEIN MFR SERPL ELPH: 8 G/DL (ref 6.4–8.2)
MCH RBC QN AUTO: 28 PG (ref 26–34)
MCHC RBC AUTO-ENTMCNC: 33.1 G/DL (ref 31–37)
MCV RBC AUTO: 84.7 FL
MONOCYTES # BLD AUTO: 0.84 X10(3) UL (ref 0.1–1)
MONOCYTES NFR BLD AUTO: 5.9 %
NEUTROPHILS # BLD AUTO: 11.95 X10 (3) UL (ref 1.5–7.7)
NEUTROPHILS # BLD AUTO: 11.95 X10(3) UL (ref 1.5–7.7)
NEUTROPHILS NFR BLD AUTO: 83.6 %
OSMOLALITY SERPL CALC.SUM OF ELEC: 283 MOSM/KG (ref 275–295)
PLATELET # BLD AUTO: 253 10(3)UL (ref 150–450)
POTASSIUM SERPL-SCNC: 3.8 MMOL/L (ref 3.5–5.1)
RBC # BLD AUTO: 4.18 X10(6)UL
SARS-COV-2 RNA RESP QL NAA+PROBE: NOT DETECTED
SODIUM SERPL-SCNC: 134 MMOL/L (ref 136–145)
WBC # BLD AUTO: 14.3 X10(3) UL (ref 4–11)

## 2021-03-14 PROCEDURE — 94660 CPAP INITIATION&MGMT: CPT

## 2021-03-14 PROCEDURE — 5A09357 ASSISTANCE WITH RESPIRATORY VENTILATION, LESS THAN 24 CONSECUTIVE HOURS, CONTINUOUS POSITIVE AIRWAY PRESSURE: ICD-10-PCS | Performed by: INTERNAL MEDICINE

## 2021-03-14 PROCEDURE — 83690 ASSAY OF LIPASE: CPT | Performed by: EMERGENCY MEDICINE

## 2021-03-14 PROCEDURE — 99285 EMERGENCY DEPT VISIT HI MDM: CPT

## 2021-03-14 PROCEDURE — 96375 TX/PRO/DX INJ NEW DRUG ADDON: CPT

## 2021-03-14 PROCEDURE — 80053 COMPREHEN METABOLIC PANEL: CPT | Performed by: EMERGENCY MEDICINE

## 2021-03-14 PROCEDURE — 96374 THER/PROPH/DIAG INJ IV PUSH: CPT

## 2021-03-14 PROCEDURE — 96372 THER/PROPH/DIAG INJ SC/IM: CPT

## 2021-03-14 PROCEDURE — 85025 COMPLETE CBC W/AUTO DIFF WBC: CPT | Performed by: EMERGENCY MEDICINE

## 2021-03-14 RX ORDER — MORPHINE SULFATE 4 MG/ML
4 INJECTION, SOLUTION INTRAMUSCULAR; INTRAVENOUS EVERY 30 MIN PRN
Status: CANCELLED | OUTPATIENT
Start: 2021-03-14 | End: 2021-03-14

## 2021-03-14 RX ORDER — HEPARIN SODIUM 5000 [USP'U]/ML
5000 INJECTION, SOLUTION INTRAVENOUS; SUBCUTANEOUS EVERY 8 HOURS SCHEDULED
Status: DISCONTINUED | OUTPATIENT
Start: 2021-03-14 | End: 2021-03-22

## 2021-03-14 RX ORDER — BUTALBITAL, ACETAMINOPHEN AND CAFFEINE 50; 325; 40 MG/1; MG/1; MG/1
1 TABLET ORAL EVERY 4 HOURS PRN
Status: DISCONTINUED | OUTPATIENT
Start: 2021-03-14 | End: 2021-03-22

## 2021-03-14 RX ORDER — ONDANSETRON 2 MG/ML
4 INJECTION INTRAMUSCULAR; INTRAVENOUS ONCE
Status: COMPLETED | OUTPATIENT
Start: 2021-03-14 | End: 2021-03-14

## 2021-03-14 RX ORDER — ONDANSETRON 2 MG/ML
4 INJECTION INTRAMUSCULAR; INTRAVENOUS EVERY 6 HOURS PRN
Status: DISCONTINUED | OUTPATIENT
Start: 2021-03-14 | End: 2021-03-17 | Stop reason: DRUGHIGH

## 2021-03-14 RX ORDER — MORPHINE SULFATE 2 MG/ML
2 INJECTION, SOLUTION INTRAMUSCULAR; INTRAVENOUS
Status: DISCONTINUED | OUTPATIENT
Start: 2021-03-14 | End: 2021-03-17 | Stop reason: DRUGHIGH

## 2021-03-14 RX ORDER — ONDANSETRON 2 MG/ML
4 INJECTION INTRAMUSCULAR; INTRAVENOUS EVERY 4 HOURS PRN
Status: CANCELLED | OUTPATIENT
Start: 2021-03-14 | End: 2021-03-14

## 2021-03-14 RX ORDER — ACETAMINOPHEN 325 MG/1
650 TABLET ORAL EVERY 6 HOURS PRN
Status: DISCONTINUED | OUTPATIENT
Start: 2021-03-14 | End: 2021-03-22

## 2021-03-14 RX ORDER — SODIUM CHLORIDE 9 MG/ML
INJECTION, SOLUTION INTRAVENOUS CONTINUOUS
Status: CANCELLED | OUTPATIENT
Start: 2021-03-14 | End: 2021-03-14

## 2021-03-14 RX ORDER — DICYCLOMINE HYDROCHLORIDE 10 MG/ML
10 INJECTION INTRAMUSCULAR ONCE
Status: COMPLETED | OUTPATIENT
Start: 2021-03-14 | End: 2021-03-14

## 2021-03-14 RX ORDER — MORPHINE SULFATE 4 MG/ML
4 INJECTION, SOLUTION INTRAMUSCULAR; INTRAVENOUS ONCE
Status: COMPLETED | OUTPATIENT
Start: 2021-03-14 | End: 2021-03-14

## 2021-03-14 NOTE — ED INITIAL ASSESSMENT (HPI)
61 yo F, presents for increasing abdomen pain. She states she have been having nausea, abdomen pain x 3 days. She was seen here yesterday. CT shows colitis. She was sent home with laxatives for constipation, antibiotics for UTI.  Last bowel movement was mor

## 2021-03-14 NOTE — ED PROVIDER NOTES
Patient Seen in: [facility] Emergency Department      History   Patient presents with:  Abdomen/Flank Pain    Stated Complaint: Seen in ER yesterday, diagnosed with SBO, pain increasing    HPI/Subjective:   HPI    Patient is a pleasant 72-year-old f (obstructive sleep apnea) 9/11/2020 PSG    AHI 10 RDI 11 REM AHI 9 Supine AHI 17 non-supine AHI 8 Sao2 Liam 63%    • Osteopenia 12/1/2011   • PONV (postoperative nausea and vomiting)    • Pre-diabetes 3/18/2002   • Problems with swallowing     feels like Social History    Tobacco Use      Smoking status: Never Smoker      Smokeless tobacco: Never Used    Vaping Use      Vaping Use: Never used    Alcohol use:  Yes      Alcohol/week: 0.0 - 2.0 standard drinks      Comment: social    Drug use: Never BUN/CREA Ratio 20.3 (*)     GFR, Non- 44 (*)     GFR, -American 51 (*)     A/G Ratio 0.9 (*)     All other components within normal limits   CBC W/ DIFFERENTIAL - Abnormal; Notable for the following components:    WBC 14.3 (*)     HG visible dilatation or calcification. PANCREAS:  No lesion, fluid collection, ductal dilatation, or atrophy. SPLEEN:  No enlargement or focal lesion. KIDNEYS:  No mass, obstruction, or calcification. ADRENALS:  No mass or enlargement.   AORTA/VASCULAR: Marina Thompson MD on 3/13/2021 at 7:29 AM              MDM      Patient was placed on a cart, an IV was established, and blood was drawn and sent to the laboratory for further evaluation. An infusion of normal saline was initiated.  Patient was observed while the la

## 2021-03-15 LAB
ANION GAP SERPL CALC-SCNC: 5 MMOL/L (ref 0–18)
BASOPHILS # BLD AUTO: 0.04 X10(3) UL (ref 0–0.2)
BASOPHILS NFR BLD AUTO: 0.4 %
BUN BLD-MCNC: 19 MG/DL (ref 7–18)
BUN/CREAT SERPL: 22.9 (ref 10–20)
CALCIUM BLD-MCNC: 8.7 MG/DL (ref 8.5–10.1)
CHLORIDE SERPL-SCNC: 102 MMOL/L (ref 98–112)
CO2 SERPL-SCNC: 29 MMOL/L (ref 21–32)
CREAT BLD-MCNC: 0.83 MG/DL
DEPRECATED RDW RBC AUTO: 40 FL (ref 35.1–46.3)
EOSINOPHIL # BLD AUTO: 0.36 X10(3) UL (ref 0–0.7)
EOSINOPHIL NFR BLD AUTO: 3.5 %
ERYTHROCYTE [DISTWIDTH] IN BLOOD BY AUTOMATED COUNT: 12.6 % (ref 11–15)
GLUCOSE BLD-MCNC: 92 MG/DL (ref 70–99)
HCT VFR BLD AUTO: 33.2 %
HGB BLD-MCNC: 10.6 G/DL
IMM GRANULOCYTES # BLD AUTO: 0.03 X10(3) UL (ref 0–1)
IMM GRANULOCYTES NFR BLD: 0.3 %
LYMPHOCYTES # BLD AUTO: 1.96 X10(3) UL (ref 1–4)
LYMPHOCYTES NFR BLD AUTO: 19.3 %
MCH RBC QN AUTO: 27.7 PG (ref 26–34)
MCHC RBC AUTO-ENTMCNC: 31.9 G/DL (ref 31–37)
MCV RBC AUTO: 86.9 FL
MONOCYTES # BLD AUTO: 0.9 X10(3) UL (ref 0.1–1)
MONOCYTES NFR BLD AUTO: 8.9 %
NEUTROPHILS # BLD AUTO: 6.86 X10 (3) UL (ref 1.5–7.7)
NEUTROPHILS # BLD AUTO: 6.86 X10(3) UL (ref 1.5–7.7)
NEUTROPHILS NFR BLD AUTO: 67.6 %
OSMOLALITY SERPL CALC.SUM OF ELEC: 284 MOSM/KG (ref 275–295)
PLATELET # BLD AUTO: 220 10(3)UL (ref 150–450)
POTASSIUM SERPL-SCNC: 3.6 MMOL/L (ref 3.5–5.1)
RBC # BLD AUTO: 3.82 X10(6)UL
SODIUM SERPL-SCNC: 136 MMOL/L (ref 136–145)
WBC # BLD AUTO: 10.2 X10(3) UL (ref 4–11)

## 2021-03-15 PROCEDURE — 85025 COMPLETE CBC W/AUTO DIFF WBC: CPT | Performed by: INTERNAL MEDICINE

## 2021-03-15 PROCEDURE — 80048 BASIC METABOLIC PNL TOTAL CA: CPT | Performed by: INTERNAL MEDICINE

## 2021-03-15 RX ORDER — ALBUTEROL SULFATE 90 UG/1
2 AEROSOL, METERED RESPIRATORY (INHALATION) EVERY 6 HOURS PRN
Status: DISCONTINUED | OUTPATIENT
Start: 2021-03-15 | End: 2021-03-22

## 2021-03-15 RX ORDER — METOPROLOL SUCCINATE 25 MG/1
25 TABLET, EXTENDED RELEASE ORAL DAILY
Status: DISCONTINUED | OUTPATIENT
Start: 2021-03-15 | End: 2021-03-22

## 2021-03-15 RX ORDER — SODIUM PHOSPHATE, DIBASIC AND SODIUM PHOSPHATE, MONOBASIC 7; 19 G/133ML; G/133ML
1 ENEMA RECTAL ONCE
Status: COMPLETED | OUTPATIENT
Start: 2021-03-15 | End: 2021-03-15

## 2021-03-15 RX ORDER — LEVOTHYROXINE SODIUM 88 UG/1
88 TABLET ORAL DAILY
Status: DISCONTINUED | OUTPATIENT
Start: 2021-03-15 | End: 2021-03-22

## 2021-03-15 RX ORDER — PANTOPRAZOLE SODIUM 40 MG/1
40 TABLET, DELAYED RELEASE ORAL
Status: DISCONTINUED | OUTPATIENT
Start: 2021-03-15 | End: 2021-03-22

## 2021-03-15 RX ORDER — MONTELUKAST SODIUM 10 MG/1
10 TABLET ORAL NIGHTLY
Status: DISCONTINUED | OUTPATIENT
Start: 2021-03-15 | End: 2021-03-22

## 2021-03-15 RX ORDER — SERTRALINE HYDROCHLORIDE 100 MG/1
100 TABLET, FILM COATED ORAL DAILY
Status: DISCONTINUED | OUTPATIENT
Start: 2021-03-15 | End: 2021-03-22

## 2021-03-15 RX ORDER — ASPIRIN 81 MG/1
81 TABLET, CHEWABLE ORAL DAILY
Status: DISCONTINUED | OUTPATIENT
Start: 2021-03-15 | End: 2021-03-22

## 2021-03-15 RX ORDER — SODIUM CHLORIDE 9 MG/ML
INJECTION, SOLUTION INTRAVENOUS CONTINUOUS
Status: DISCONTINUED | OUTPATIENT
Start: 2021-03-15 | End: 2021-03-16

## 2021-03-15 RX ORDER — GABAPENTIN 300 MG/1
300 CAPSULE ORAL DAILY
Status: DISCONTINUED | OUTPATIENT
Start: 2021-03-15 | End: 2021-03-22

## 2021-03-15 RX ORDER — ATORVASTATIN CALCIUM 20 MG/1
20 TABLET, FILM COATED ORAL NIGHTLY
Refills: 3 | Status: DISCONTINUED | OUTPATIENT
Start: 2021-03-15 | End: 2021-03-22

## 2021-03-15 NOTE — CONSULTS
[facility]    Report of Consultation    Ruddy Alvarez Patient Status:  Observation    1955 MRN [id number]   [facility] 3NW-A Attending Jack Haji DO   Hosp Day # 0 PCP Scott Valdovinos MD     Date of Admission 9 Supine AHI 17 non-supine AHI 8 Sao2 Liam 63%    • Osteopenia 12/1/2011   • PONV (postoperative nausea and vomiting)    • Pre-diabetes 3/18/2002   • Problems with swallowing     feels like something stuck in throat at all times/ now resolved- 07/2019   • Father    • Heart Disorder Father         \"HEART Mel Zimmerman"   • Other (Other) Father    • Obesity Daughter    • Other (Other) Daughter    • Allergies Son    • Other (Other) Son         ATOPIC RHINITIS   • Heart Disorder Sister         IRREGULAR  HEART  RADHA Intravenous, Q3H PRN  •  Butalbital-APAP-Caffeine (FIORICET) per tab 1 tablet, 1 tablet, Oral, Q4H PRN    Review of Systems:  GENERAL: feels well otherwise  SKIN: neg  EYES: neg  HEENT: neg  LUNGS: neg  CARDIOVASCULAR: neg  GI: as above  NEURO: neg  PSYCHE BE CONGENITAL BY RADIOLOGY     HTN, goal below 130/80     BPPV (benign paroxysmal positional vertigo)     Migraine     Eczema     Pre-diabetes     Hyperlipidemia with target LDL less than 100     Fibromyalgia     GERD (gastroesophageal reflux disease)

## 2021-03-15 NOTE — PLAN OF CARE
Patient A&Ox4, c/o pain this shift, managed with prn medication. Tolerating CLD. Has golytely at bedside. Questions answered concerns addressed, none further at this time.    Problem: Patient/Family Goals  Goal: Patient/Family Long Term Goal  Description: P including physical limitations  - Instruct pt to call for assistance with activity based on assessment  - Modify environment to reduce risk of injury  - Provide assistive devices as appropriate  - Consider OT/PT consult to assist with strengthening/mobilit

## 2021-03-15 NOTE — H&P
[facility] Hospitalist History and Physical      Patient presents with:  Abdomen/Flank Pain       PCP: Yariel Roberts MD      History of Present Illness: Patient is a 60year old female with PMH sig for Sjogren's syndrome, migraines, pAF, HTN, HLD, and G (postoperative nausea and vomiting)    • Pre-diabetes 3/18/2002   • Problems with swallowing     feels like something stuck in throat at all times/ now resolved- 07/2019   • Sleep apnea    • Vitamin D deficiency 1/13/2013      Past Surgical History:   Proc medications on file prior to encounter. Amoxicillin-Pot Clavulanate 875-125 MG Oral Tab, Take 1 tablet by mouth 2 (two) times daily for 10 days. , Disp: 20 tablet, Rfl: 0  mometasone 0.1 % External Ointment, Apply 1 Application topically 2 (two) times anibal 0  Albuterol Sulfate HFA (PROAIR HFA) 108 (90 Base) MCG/ACT Inhalation Aero Soln, Inhale 2 puffs into the lungs every 6 (six) hours as needed for Wheezing., Disp: 3 Inhaler, Rfl: 3  Ferrous Sulfate 325 (65 Fe) MG Oral Tab, Take 1 tablet (325 mg total) by m 220.0 03/15/2021    CREATSERUM 0.83 03/15/2021    BUN 19 03/15/2021     03/15/2021    K 3.6 03/15/2021     03/15/2021    CO2 29.0 03/15/2021    GLU 92 03/15/2021    CA 8.7 03/15/2021    ALB 3.8 03/14/2021    ALKPHO 108 03/14/2021    BILT 0.3 03 distend the mid descending colon. There are few scattered diverticula involving the right colon. The visualized small bowel is grossly unremarkable. There is no abscess or free air.   There is a moderate size hiatal hernia with organic axial volvulus of UTI  - UCx from 3/13 with ESBL  - R to augmentin   - will start empiric meropenem   - ID c/s to assist with definitive oral tx    # Sjogren's syndrome   - follow with Dr. Kalyan Ortiz of rheum   - controlled     # Migraine syndrome   - cont sertarline and fiocet P

## 2021-03-15 NOTE — PLAN OF CARE
Pt c/o of nausea. Received fleets enema with no BM results. Pt did drink 1/2 gallon Golytely but sopped because of nusea. Per GI , ok for gastrografin to be done 3/16.

## 2021-03-15 NOTE — CONSULTS
INFECTIOUS DISEASE CONSULTATION    Ruddy Alvarez Patient Status:  Observation    1955 MRN [id number]   [facility] 3NW-A Attending Jack Haji,    Hosp Day # 0 PCP Todd Johnson URETHROLYSIS N/A 8/26/2019    Performed by Donaldo Nicholas MD at [address] [place] [address]   • COLONOSCOPY     • COLONOSCOPY  4/12/19= Diverticulosis    Repeat 2029   • COLONOSCOPY N/A 4/12/2019    Performed by Domingo Goyal MD at [address] [address] reports that she has never smoked. She has never used smokeless tobacco. She reports previous alcohol use. She reports that she does not use drugs.       Allergies:    Triptans                RASH    Comment:CONTRAINDICATED DUE TO CORONARY ARTERY VASOSPASM tablet, Rfl: 3  Verapamil HCl  MG Oral Tab CR, Take 1 tablet (240 mg total) by mouth once daily. , Disp: 90 tablet, Rfl: 1  Sertraline HCl 100 MG Oral Tab, Take 1 tablet (100 mg total) by mouth daily. , Disp: 90 tablet, Rfl: 1  Montelukast Sodium 10 MG Temp:  [98.2 °F (36.8 °C)-98.7 °F (37.1 °C)] 98.7 °F (37.1 °C)  Pulse:  [71-80] 72  Resp:  [16-18] 18  BP: (120-133)/(54-79) 128/65  HEENT: Moist mucous membranes. Extraocular muscles are intact.   Neck: No swelling, no masses  Respiratory: Non labored, sym Pre-diabetes     Hyperlipidemia with target LDL less than 100     Fibromyalgia     GERD (gastroesophageal reflux disease)     AF (atrial fibrillation) RESOLVED SPONTANEOUSLY     Anemia, iron deficiency     Osteopenia     Vitamin D deficiency     PAF (parox

## 2021-03-16 ENCOUNTER — ANESTHESIA EVENT (OUTPATIENT)
Dept: ENDOSCOPY | Facility: HOSPITAL | Age: 66
DRG: 330 | End: 2021-03-16
Payer: COMMERCIAL

## 2021-03-16 ENCOUNTER — ANESTHESIA (OUTPATIENT)
Dept: ENDOSCOPY | Facility: HOSPITAL | Age: 66
DRG: 330 | End: 2021-03-16
Payer: COMMERCIAL

## 2021-03-16 ENCOUNTER — APPOINTMENT (OUTPATIENT)
Dept: GENERAL RADIOLOGY | Facility: HOSPITAL | Age: 66
DRG: 330 | End: 2021-03-16
Attending: INTERNAL MEDICINE
Payer: COMMERCIAL

## 2021-03-16 LAB
ANION GAP SERPL CALC-SCNC: 5 MMOL/L (ref 0–18)
BASOPHILS # BLD AUTO: 0.03 X10(3) UL (ref 0–0.2)
BASOPHILS NFR BLD AUTO: 0.3 %
BUN BLD-MCNC: 16 MG/DL (ref 7–18)
BUN/CREAT SERPL: 27.1 (ref 10–20)
CALCIUM BLD-MCNC: 8.3 MG/DL (ref 8.5–10.1)
CHLORIDE SERPL-SCNC: 102 MMOL/L (ref 98–112)
CO2 SERPL-SCNC: 29 MMOL/L (ref 21–32)
CREAT BLD-MCNC: 0.59 MG/DL
DEPRECATED RDW RBC AUTO: 39.6 FL (ref 35.1–46.3)
EOSINOPHIL # BLD AUTO: 0.17 X10(3) UL (ref 0–0.7)
EOSINOPHIL NFR BLD AUTO: 1.9 %
ERYTHROCYTE [DISTWIDTH] IN BLOOD BY AUTOMATED COUNT: 12.6 % (ref 11–15)
GLUCOSE BLD-MCNC: 82 MG/DL (ref 70–99)
HCT VFR BLD AUTO: 33.5 %
HGB BLD-MCNC: 10.6 G/DL
IMM GRANULOCYTES # BLD AUTO: 0.03 X10(3) UL (ref 0–1)
IMM GRANULOCYTES NFR BLD: 0.3 %
LYMPHOCYTES # BLD AUTO: 1.62 X10(3) UL (ref 1–4)
LYMPHOCYTES NFR BLD AUTO: 17.9 %
MCH RBC QN AUTO: 27.2 PG (ref 26–34)
MCHC RBC AUTO-ENTMCNC: 31.6 G/DL (ref 31–37)
MCV RBC AUTO: 86.1 FL
MONOCYTES # BLD AUTO: 0.78 X10(3) UL (ref 0.1–1)
MONOCYTES NFR BLD AUTO: 8.6 %
NEUTROPHILS # BLD AUTO: 6.42 X10 (3) UL (ref 1.5–7.7)
NEUTROPHILS # BLD AUTO: 6.42 X10(3) UL (ref 1.5–7.7)
NEUTROPHILS NFR BLD AUTO: 71 %
OSMOLALITY SERPL CALC.SUM OF ELEC: 282 MOSM/KG (ref 275–295)
PLATELET # BLD AUTO: 232 10(3)UL (ref 150–450)
POTASSIUM SERPL-SCNC: 3.5 MMOL/L (ref 3.5–5.1)
RBC # BLD AUTO: 3.89 X10(6)UL
SODIUM SERPL-SCNC: 136 MMOL/L (ref 136–145)
WBC # BLD AUTO: 9.1 X10(3) UL (ref 4–11)

## 2021-03-16 PROCEDURE — 80048 BASIC METABOLIC PNL TOTAL CA: CPT | Performed by: INTERNAL MEDICINE

## 2021-03-16 PROCEDURE — 0D7N8ZZ DILATION OF SIGMOID COLON, VIA NATURAL OR ARTIFICIAL OPENING ENDOSCOPIC: ICD-10-PCS | Performed by: INTERNAL MEDICINE

## 2021-03-16 PROCEDURE — 74270 X-RAY XM COLON 1CNTRST STD: CPT | Performed by: INTERNAL MEDICINE

## 2021-03-16 PROCEDURE — 85025 COMPLETE CBC W/AUTO DIFF WBC: CPT | Performed by: INTERNAL MEDICINE

## 2021-03-16 RX ORDER — SODIUM CHLORIDE, SODIUM LACTATE, POTASSIUM CHLORIDE, CALCIUM CHLORIDE 600; 310; 30; 20 MG/100ML; MG/100ML; MG/100ML; MG/100ML
INJECTION, SOLUTION INTRAVENOUS CONTINUOUS
Status: DISCONTINUED | OUTPATIENT
Start: 2021-03-16 | End: 2021-03-16 | Stop reason: HOSPADM

## 2021-03-16 RX ORDER — HYDROMORPHONE HYDROCHLORIDE 1 MG/ML
INJECTION, SOLUTION INTRAMUSCULAR; INTRAVENOUS; SUBCUTANEOUS
Status: COMPLETED
Start: 2021-03-16 | End: 2021-03-16

## 2021-03-16 RX ORDER — LIDOCAINE HYDROCHLORIDE 10 MG/ML
INJECTION, SOLUTION EPIDURAL; INFILTRATION; INTRACAUDAL; PERINEURAL AS NEEDED
Status: DISCONTINUED | OUTPATIENT
Start: 2021-03-16 | End: 2021-03-16 | Stop reason: SURG

## 2021-03-16 RX ORDER — ONDANSETRON 2 MG/ML
4 INJECTION INTRAMUSCULAR; INTRAVENOUS AS NEEDED
Status: DISCONTINUED | OUTPATIENT
Start: 2021-03-16 | End: 2021-03-16 | Stop reason: HOSPADM

## 2021-03-16 RX ORDER — SODIUM CHLORIDE, SODIUM LACTATE, POTASSIUM CHLORIDE, CALCIUM CHLORIDE 600; 310; 30; 20 MG/100ML; MG/100ML; MG/100ML; MG/100ML
INJECTION, SOLUTION INTRAVENOUS CONTINUOUS
Status: DISCONTINUED | OUTPATIENT
Start: 2021-03-16 | End: 2021-03-16

## 2021-03-16 RX ORDER — ONDANSETRON 2 MG/ML
4 INJECTION INTRAMUSCULAR; INTRAVENOUS AS NEEDED
Status: DISCONTINUED | OUTPATIENT
Start: 2021-03-16 | End: 2021-03-17

## 2021-03-16 RX ORDER — HYDROMORPHONE HYDROCHLORIDE 1 MG/ML
0.4 INJECTION, SOLUTION INTRAMUSCULAR; INTRAVENOUS; SUBCUTANEOUS EVERY 5 MIN PRN
Status: DISCONTINUED | OUTPATIENT
Start: 2021-03-16 | End: 2021-03-16 | Stop reason: HOSPADM

## 2021-03-16 RX ORDER — NALOXONE HYDROCHLORIDE 0.4 MG/ML
80 INJECTION, SOLUTION INTRAMUSCULAR; INTRAVENOUS; SUBCUTANEOUS AS NEEDED
Status: DISCONTINUED | OUTPATIENT
Start: 2021-03-16 | End: 2021-03-17

## 2021-03-16 RX ORDER — SODIUM CHLORIDE 9 MG/ML
INJECTION, SOLUTION INTRAVENOUS CONTINUOUS
Status: DISCONTINUED | OUTPATIENT
Start: 2021-03-16 | End: 2021-03-17

## 2021-03-16 RX ORDER — NALOXONE HYDROCHLORIDE 0.4 MG/ML
80 INJECTION, SOLUTION INTRAMUSCULAR; INTRAVENOUS; SUBCUTANEOUS AS NEEDED
Status: DISCONTINUED | OUTPATIENT
Start: 2021-03-16 | End: 2021-03-16 | Stop reason: HOSPADM

## 2021-03-16 RX ADMIN — SODIUM CHLORIDE: 9 INJECTION, SOLUTION INTRAVENOUS at 17:33:00

## 2021-03-16 RX ADMIN — SODIUM CHLORIDE: 9 INJECTION, SOLUTION INTRAVENOUS at 16:47:00

## 2021-03-16 RX ADMIN — LIDOCAINE HYDROCHLORIDE 30 MG: 10 INJECTION, SOLUTION EPIDURAL; INFILTRATION; INTRACAUDAL; PERINEURAL at 16:51:00

## 2021-03-16 NOTE — CONSULTS
[facility]  Report of Consultation    Kaylie Leiva Patient Status:  Inpatient    1955 MRN [id number]   [facility] ENDOSCOPY Attending Cat Zuñiga, DO   Hosp Day # 0 PCP Usha Bardales MD     [facility] for [facility], [facility] ([facility] PONV (postoperative nausea and vomiting)    • Pre-diabetes 3/18/2002   • Problems with swallowing     feels like something stuck in throat at all times/ now resolved- 07/2019   • Sleep apnea    • Vitamin D deficiency 1/13/2013     Past Surgical History: • Obesity Daughter    • Other (Other) Daughter    • Allergies Son    • Other (Other) Son         ATOPIC RHINITIS   • Heart Disorder Sister         IRREGULAR  HEART  BEAT   • Cancer Sister         gynecologic   • Other (Other) Sister         MS   • Diabet no weight loss, no fever or chills  SKIN: denies any unusual skin   HEENT: denies nasal congestion  RESPIRATORY: denies shortness of breath or cough   CARDIOVASCULAR: no palpitations   GI: nausea and left lower quadrant pain  GENITAL/: no dysuria  MUSCUL     FINDINGS:     LIVER:  No enlargement, atrophy, abnormal density, or significant focal lesion.  Simple appearing cyst in the right lobe measures 14 mm.    BILIARY:  No visible dilatation or calcification.     PANCREAS:  No lesion, fluid collection, ricky wall thickening involving the right aspect of the urinary bladder can be seen with infectious cystitis.          PROCEDURE:  XR COLON SINGLE CONTRAST (CPT=74270)       TECHNIQUE:  A single contrast barium enema examination was performed in the usual manner consistency of the stool ball is solid like a rock. 2 L of water as used to try and wash this away, but the water flush did not break up the stool ball.     Impression and Plan: sigmoid colon obstruction secondary to stool    -discussed with patient that b

## 2021-03-16 NOTE — PROGRESS NOTES
[facility] Hospitalist Progress Note                                                                   [address] [facility]  9/26/1955    SUBJECTIVE:  Pt seen and examined.   She underwent barium enema 240 mg Oral Daily   • meropenem  500 mg Intravenous Q8H   • Heparin Sodium (Porcine)  5,000 Units Subcutaneous Q8H [facility]     Continuous Infusions:   • sodium chloride 83 mL/hr at 03/16/21 0746     PRN: Albuterol Sulfate HFA, acetaminophen, ondansetron HCl, m

## 2021-03-16 NOTE — PLAN OF CARE
A&Ox4. Rates pain 4/10. Reports mild nausea. Abdomen soft and tender. Bowel sounds active. Denies passing flatus. IVF infusing. Reviewed plan of care. 2364 Patient taken to xray in stable condition. 1150 Patient back from xray in stable condition. physical needs  - Identify cognitive and physical deficits and behaviors that affect risk of falls.   - [place] fall precautions as indicated by assessment.  - Educate pt/family on patient safety including physical limitations  - Instruct pt to call for a

## 2021-03-16 NOTE — OPERATIVE REPORT
[facility]                                                                                              Colonoscopy Operative Report    Allyson Camejo Patient Status:  Inpatient     was transferred to the recovery area in stable condition. Quality of Preparation: Inadequate  Aronchick Bowel Prep Scale:  4 - poor  Findings: In the sigmoid colon, there are several tight angulations.   At about 40 cm from the anus, there is a very [place]

## 2021-03-16 NOTE — PROGRESS NOTES
[facility]                INFECTIOUS DISEASE PROGRESS NOTE    Shima Soto Patient Status:  Inpatient    1955 MRN [id number]   [facility] 3NW-A Attending Macho Ace DO   Hosp Day # 0 PCP MATTY Kumar  102 102 102   CO2 27.0 28.0 29.0 29.0   ALKPHO 116 108  --   --    AST 20 23  --   --    ALT 30 28  --   --    BILT 0.3 0.3  --   --    TP 7.8 8.0  --   --          Problem list reviewed:  Patient Active Problem List:     Hashimoto's disease     C

## 2021-03-16 NOTE — PROGRESS NOTES
GI update:    Gastrograffin enema:  FINDINGS:     COLON:  The rectum and sigmoid are empty.  There is high-grade obstruction at the junction of the distal descending colon and the proximal sigmoid.  Despite maneuvers there was not able to get contrast beyo

## 2021-03-16 NOTE — PLAN OF CARE
Patient A&Ox4, VSS. Telemetry monitoring continued NSR. IVF/abx continued. Tolerating CLD. No BM this shift. Pain managed with prn medication. Contact isolation maintained. Questions and concerns addressed, none further at this time.    Problem: Patient/Fam by assessment.  - Educate pt/family on patient safety including physical limitations  - Instruct pt to call for assistance with activity based on assessment  - Modify environment to reduce risk of injury  - Provide assistive devices as appropriate  - Consi

## 2021-03-16 NOTE — ANESTHESIA PREPROCEDURE EVALUATION
PRE-OP EVALUATION    Patient Name: Lowanda Day    Pre-op Diagnosis: colon obstruction    Procedure(s): colonoscopy w/ decompression      Surgeon(s) and Role:     Traci Rubio MD - Primary    Pre-op vitals reviewed.   Temp: 97.8 °F (36.6 °C)  Pulse: 73 PRN        Outpatient Medications:   Amoxicillin-Pot Clavulanate 875-125 MG Oral Tab, Take 1 tablet by mouth 2 (two) times daily for 10 days. , Disp: 20 tablet, Rfl: 0, 3/14/2021 at AM   mometasone 0.1 % External Ointment, Apply 1 Application topically 2 (t , 3/13/2021 at Unknown time  Cholecalciferol (VITAMIN D3 OR), Take 1 capsule by mouth daily. , Disp: , Rfl: , 3/13/2021 at Unknown time  aspirin 81 MG Oral Chew Tab, Chew 1 tablet (81 mg total) by mouth daily. , Disp: 90 tablet, Rfl: 0, 3/14/2021 at AM  Al SURGICAL HISTORY      R SHOULDER revision RCR   • OTHER SURGICAL HISTORY      B LARGE TOENAIL REMOVAL DUE TO SEVERE ONYCHOMYCOSIS   • OTHER SURGICAL HISTORY  7/2011    R  KNEE  ARTHROSCOPY   • OTHER SURGICAL HISTORY  08/26/2019    Apical suspension with ut FB  TM distance: 4 - 6 cm  Neck ROM: full Cardiovascular      Rhythm: regular  Rate: normal     Dental  Comment: No loose    Dental appliance(s): lower dentures and upper dentures       Pulmonary      Breath sounds clear to auscultation bilaterally.

## 2021-03-16 NOTE — PAYOR COMM NOTE
--------------  ADMISSION REVIEW     Payor: Juni Coleman PPO  Subscriber #:  [id number]  Authorization Number: [id number]    Began as Observation on 3/14  Admit to INPT date: 3/16/21  severe constipation causing ischemic colitis/stercoral colitis in the left col yesterday, diagnosed with SBO, pain increasing  Other systems are as noted in HPI. Constitutional and vital signs reviewed.           Physical Exam     ED Triage Vitals   BP 03/14/21 1815 133/79   Pulse 03/14/21 1815 72   Resp 03/14/21 1825 16   Temp 03/14 LIPASE - Normal   CBC WITH DIFFERENTIAL WITH PLATELET    Narrative: The following orders were created for panel order CBC WITH DIFFERENTIAL WITH PLATELET.   Procedure                               Abnormality         Status                     --------- thickening involving the splenic flexure and descending colon with pericolonic inflammatory change extending to the mid descending colon. There is a large stool ball which is seen to distend the mid descending colon.   There are few scattered diverticula i in the descending colon, which is not able to be manually disimpacted. Sigmoid and colonic inflammation noted on CT. Dr. Pedro Rodriguez recommends admission for supportive care and GoLYTELY administration, which was ordered.   Patient will be admitted to the serv given IV morphine, zofran, and bentyl. She was admitted and started on golytely. On my evaluation, pt states she still has severe abd pain and distention despite the golytely. Still has not passed gas.           No current facility-administered medic Fatty Acids (FISH OIL OR), Take 1 capsule by mouth daily. , Disp: , Rfl:   Cholecalciferol (VITAMIN D3 OR), Take 1 capsule by mouth daily. , Disp: , Rfl:   aspirin 81 MG Oral Chew Tab, Chew 1 tablet (81 mg total) by mouth daily. , Disp: 90 tablet, Rfl: 0 PROCEDURE:  CT ABDOMEN PELVIS IV CONTRAST, NO ORAL (ER)  COMPARISON:  DEJAH , CT, CT ABDOMEN+PELVIS(CONTRAST ONLY)(CPT=74177), 5/17/2020, 4:27 PM.  INDICATIONS:  abdomenal pain and back pain  TECHNIQUE:  CT scanning was performed from the dome of the diap dome is stable since previous exam.  This may be related to infectious cystitis and correlation with urinalysis is recommended. PELVIC NODES:  No adenopathy. PELVIC ORGANS:  No visible mass. Pelvic organs appropriate for patient age.   BONES:  No bony le reviewed. [facility] hospitalist to continue to follow patient while in house  A total of 70 minutes taken with patient and coordinating care. Greater than 50% face to face encounter.       Keegan Garcia DO  [facility] Hospitalist          Electronically signed by Slim Alcantar Oral Winsome Jade, RN      Montelukast Sodium (SINGULAIR) tab 10 mg     Date Action Dose Route User    3/15/2021 2002 Given 10 mg Oral Hilda Isaacs [facility]      morphINE sulfate (PF) 2 MG/ML injection 2 mg     Date Action Dose Route User    3/16/2021 1206 G Neck: supple no masses  Respiratory: Non labored, symmetric excursion, normal respirations  Cardiovascular: no irregularities in rhythm  Musculoskeletal: joints: no swelling   Integument: No lesions. No erythema. No open wounds.   Labs:      COVID-19 Lab Re may be stercoral due to irration from constipation.   - barium contrast enema showing high grade obstruction of the distal descending colon and proximal sigmoid colon.     - GI c/s appreciated   - cont CLD, add IVFs  - pain control and antiemetics  - await medication in sequence.  Follow therapeutic duplication policy.             1123-Given     1544-Given      0831-Given

## 2021-03-16 NOTE — ANESTHESIA POSTPROCEDURE EVALUATION
[facility] Patient Status:  Inpatient   Age/Gender 60year old female MRN [id number]   Location [address] [facility]. Attending Guru De Leon 1397 Day # 0 PCP Elisha Childs MD       Anesthesia Post-op Note

## 2021-03-17 ENCOUNTER — ANESTHESIA (OUTPATIENT)
Dept: SURGERY | Facility: HOSPITAL | Age: 66
DRG: 330 | End: 2021-03-17
Payer: COMMERCIAL

## 2021-03-17 ENCOUNTER — ANESTHESIA EVENT (OUTPATIENT)
Dept: SURGERY | Facility: HOSPITAL | Age: 66
DRG: 330 | End: 2021-03-17
Payer: COMMERCIAL

## 2021-03-17 LAB
ANION GAP SERPL CALC-SCNC: 6 MMOL/L (ref 0–18)
ANTIBODY SCREEN: NEGATIVE
ATRIAL RATE: 65 BPM
BUN BLD-MCNC: 10 MG/DL (ref 7–18)
BUN/CREAT SERPL: 14.7 (ref 10–20)
CALCIUM BLD-MCNC: 8 MG/DL (ref 8.5–10.1)
CHLORIDE SERPL-SCNC: 103 MMOL/L (ref 98–112)
CO2 SERPL-SCNC: 27 MMOL/L (ref 21–32)
CREAT BLD-MCNC: 0.68 MG/DL
GLUCOSE BLD-MCNC: 80 MG/DL (ref 70–99)
OSMOLALITY SERPL CALC.SUM OF ELEC: 280 MOSM/KG (ref 275–295)
P AXIS: 39 DEGREES
P-R INTERVAL: 132 MS
POTASSIUM SERPL-SCNC: 3.6 MMOL/L (ref 3.5–5.1)
Q-T INTERVAL: 430 MS
QRS DURATION: 92 MS
QTC CALCULATION (BEZET): 447 MS
R AXIS: 20 DEGREES
RH BLOOD TYPE: POSITIVE
SODIUM SERPL-SCNC: 136 MMOL/L (ref 136–145)
T AXIS: 17 DEGREES
VENTRICULAR RATE: 65 BPM

## 2021-03-17 PROCEDURE — 86850 RBC ANTIBODY SCREEN: CPT | Performed by: INTERNAL MEDICINE

## 2021-03-17 PROCEDURE — 93010 ELECTROCARDIOGRAM REPORT: CPT | Performed by: INTERNAL MEDICINE

## 2021-03-17 PROCEDURE — 86900 BLOOD TYPING SEROLOGIC ABO: CPT | Performed by: INTERNAL MEDICINE

## 2021-03-17 PROCEDURE — 88307 TISSUE EXAM BY PATHOLOGIST: CPT | Performed by: SURGERY

## 2021-03-17 PROCEDURE — 86901 BLOOD TYPING SEROLOGIC RH(D): CPT | Performed by: INTERNAL MEDICINE

## 2021-03-17 PROCEDURE — 93005 ELECTROCARDIOGRAM TRACING: CPT

## 2021-03-17 PROCEDURE — 0DBN0ZZ EXCISION OF SIGMOID COLON, OPEN APPROACH: ICD-10-PCS | Performed by: SURGERY

## 2021-03-17 PROCEDURE — 76942 ECHO GUIDE FOR BIOPSY: CPT | Performed by: INTERNAL MEDICINE

## 2021-03-17 PROCEDURE — 0D1N0Z4 BYPASS SIGMOID COLON TO CUTANEOUS, OPEN APPROACH: ICD-10-PCS | Performed by: SURGERY

## 2021-03-17 PROCEDURE — 80048 BASIC METABOLIC PNL TOTAL CA: CPT | Performed by: INTERNAL MEDICINE

## 2021-03-17 PROCEDURE — 3E0T3BZ INTRODUCTION OF ANESTHETIC AGENT INTO PERIPHERAL NERVES AND PLEXI, PERCUTANEOUS APPROACH: ICD-10-PCS | Performed by: INTERNAL MEDICINE

## 2021-03-17 RX ORDER — DIPHENHYDRAMINE HYDROCHLORIDE 50 MG/ML
12.5 INJECTION INTRAMUSCULAR; INTRAVENOUS AS NEEDED
Status: DISCONTINUED | OUTPATIENT
Start: 2021-03-17 | End: 2021-03-17 | Stop reason: HOSPADM

## 2021-03-17 RX ORDER — HYDROCODONE BITARTRATE AND ACETAMINOPHEN 5; 325 MG/1; MG/1
2 TABLET ORAL EVERY 4 HOURS PRN
Status: DISCONTINUED | OUTPATIENT
Start: 2021-03-17 | End: 2021-03-22

## 2021-03-17 RX ORDER — MIDAZOLAM HYDROCHLORIDE 1 MG/ML
1 INJECTION INTRAMUSCULAR; INTRAVENOUS EVERY 5 MIN PRN
Status: DISCONTINUED | OUTPATIENT
Start: 2021-03-17 | End: 2021-03-17 | Stop reason: HOSPADM

## 2021-03-17 RX ORDER — MEPERIDINE HYDROCHLORIDE 25 MG/ML
12.5 INJECTION INTRAMUSCULAR; INTRAVENOUS; SUBCUTANEOUS AS NEEDED
Status: DISCONTINUED | OUTPATIENT
Start: 2021-03-17 | End: 2021-03-17 | Stop reason: HOSPADM

## 2021-03-17 RX ORDER — ACETAMINOPHEN 325 MG/1
650 TABLET ORAL EVERY 4 HOURS PRN
Status: DISCONTINUED | OUTPATIENT
Start: 2021-03-17 | End: 2021-03-22

## 2021-03-17 RX ORDER — HYDROCODONE BITARTRATE AND ACETAMINOPHEN 5; 325 MG/1; MG/1
1 TABLET ORAL EVERY 4 HOURS PRN
Status: DISCONTINUED | OUTPATIENT
Start: 2021-03-17 | End: 2021-03-22

## 2021-03-17 RX ORDER — MORPHINE SULFATE 2 MG/ML
2 INJECTION, SOLUTION INTRAMUSCULAR; INTRAVENOUS EVERY 2 HOUR PRN
Status: DISCONTINUED | OUTPATIENT
Start: 2021-03-17 | End: 2021-03-22

## 2021-03-17 RX ORDER — NALOXONE HYDROCHLORIDE 0.4 MG/ML
80 INJECTION, SOLUTION INTRAMUSCULAR; INTRAVENOUS; SUBCUTANEOUS AS NEEDED
Status: DISCONTINUED | OUTPATIENT
Start: 2021-03-17 | End: 2021-03-17 | Stop reason: HOSPADM

## 2021-03-17 RX ORDER — NEOSTIGMINE METHYLSULFATE 1 MG/ML
INJECTION INTRAVENOUS AS NEEDED
Status: DISCONTINUED | OUTPATIENT
Start: 2021-03-17 | End: 2021-03-17 | Stop reason: SURG

## 2021-03-17 RX ORDER — LIDOCAINE HYDROCHLORIDE 10 MG/ML
INJECTION, SOLUTION EPIDURAL; INFILTRATION; INTRACAUDAL; PERINEURAL AS NEEDED
Status: DISCONTINUED | OUTPATIENT
Start: 2021-03-17 | End: 2021-03-17 | Stop reason: SURG

## 2021-03-17 RX ORDER — METOCLOPRAMIDE HYDROCHLORIDE 5 MG/ML
10 INJECTION INTRAMUSCULAR; INTRAVENOUS EVERY 6 HOURS PRN
Status: DISCONTINUED | OUTPATIENT
Start: 2021-03-17 | End: 2021-03-22

## 2021-03-17 RX ORDER — HYDROMORPHONE HYDROCHLORIDE 1 MG/ML
INJECTION, SOLUTION INTRAMUSCULAR; INTRAVENOUS; SUBCUTANEOUS
Status: COMPLETED
Start: 2021-03-17 | End: 2021-03-17

## 2021-03-17 RX ORDER — LABETALOL HYDROCHLORIDE 5 MG/ML
10 INJECTION, SOLUTION INTRAVENOUS EVERY 6 HOURS PRN
Status: DISCONTINUED | OUTPATIENT
Start: 2021-03-17 | End: 2021-03-22

## 2021-03-17 RX ORDER — MORPHINE SULFATE 2 MG/ML
1 INJECTION, SOLUTION INTRAMUSCULAR; INTRAVENOUS EVERY 2 HOUR PRN
Status: DISCONTINUED | OUTPATIENT
Start: 2021-03-17 | End: 2021-03-22

## 2021-03-17 RX ORDER — SODIUM CHLORIDE, SODIUM LACTATE, POTASSIUM CHLORIDE, CALCIUM CHLORIDE 600; 310; 30; 20 MG/100ML; MG/100ML; MG/100ML; MG/100ML
INJECTION, SOLUTION INTRAVENOUS CONTINUOUS
Status: DISCONTINUED | OUTPATIENT
Start: 2021-03-17 | End: 2021-03-17 | Stop reason: HOSPADM

## 2021-03-17 RX ORDER — MORPHINE SULFATE 4 MG/ML
4 INJECTION, SOLUTION INTRAMUSCULAR; INTRAVENOUS EVERY 2 HOUR PRN
Status: DISCONTINUED | OUTPATIENT
Start: 2021-03-17 | End: 2021-03-22

## 2021-03-17 RX ORDER — HYDROMORPHONE HYDROCHLORIDE 1 MG/ML
0.4 INJECTION, SOLUTION INTRAMUSCULAR; INTRAVENOUS; SUBCUTANEOUS EVERY 5 MIN PRN
Status: DISCONTINUED | OUTPATIENT
Start: 2021-03-17 | End: 2021-03-17 | Stop reason: HOSPADM

## 2021-03-17 RX ORDER — DEXTROSE, SODIUM CHLORIDE, AND POTASSIUM CHLORIDE 5; .45; .15 G/100ML; G/100ML; G/100ML
INJECTION INTRAVENOUS CONTINUOUS
Status: DISCONTINUED | OUTPATIENT
Start: 2021-03-17 | End: 2021-03-19

## 2021-03-17 RX ORDER — ROCURONIUM BROMIDE 10 MG/ML
INJECTION, SOLUTION INTRAVENOUS AS NEEDED
Status: DISCONTINUED | OUTPATIENT
Start: 2021-03-17 | End: 2021-03-17 | Stop reason: SURG

## 2021-03-17 RX ORDER — GLYCOPYRROLATE 0.2 MG/ML
INJECTION, SOLUTION INTRAMUSCULAR; INTRAVENOUS AS NEEDED
Status: DISCONTINUED | OUTPATIENT
Start: 2021-03-17 | End: 2021-03-17 | Stop reason: SURG

## 2021-03-17 RX ORDER — ONDANSETRON 2 MG/ML
4 INJECTION INTRAMUSCULAR; INTRAVENOUS AS NEEDED
Status: DISCONTINUED | OUTPATIENT
Start: 2021-03-17 | End: 2021-03-17 | Stop reason: HOSPADM

## 2021-03-17 RX ORDER — ALBUTEROL SULFATE 2.5 MG/3ML
2.5 SOLUTION RESPIRATORY (INHALATION) AS NEEDED
Status: DISCONTINUED | OUTPATIENT
Start: 2021-03-17 | End: 2021-03-17 | Stop reason: HOSPADM

## 2021-03-17 RX ORDER — KETOROLAC TROMETHAMINE 30 MG/ML
30 INJECTION, SOLUTION INTRAMUSCULAR; INTRAVENOUS EVERY 6 HOURS PRN
Status: DISPENSED | OUTPATIENT
Start: 2021-03-17 | End: 2021-03-19

## 2021-03-17 RX ORDER — SODIUM CHLORIDE 9 MG/ML
INJECTION, SOLUTION INTRAVENOUS CONTINUOUS
Status: DISCONTINUED | OUTPATIENT
Start: 2021-03-17 | End: 2021-03-19

## 2021-03-17 RX ORDER — SODIUM CHLORIDE, SODIUM LACTATE, POTASSIUM CHLORIDE, CALCIUM CHLORIDE 600; 310; 30; 20 MG/100ML; MG/100ML; MG/100ML; MG/100ML
INJECTION, SOLUTION INTRAVENOUS CONTINUOUS PRN
Status: DISCONTINUED | OUTPATIENT
Start: 2021-03-17 | End: 2021-03-17 | Stop reason: SURG

## 2021-03-17 RX ORDER — ONDANSETRON 2 MG/ML
INJECTION INTRAMUSCULAR; INTRAVENOUS
Status: COMPLETED
Start: 2021-03-17 | End: 2021-03-17

## 2021-03-17 RX ORDER — DEXAMETHASONE SODIUM PHOSPHATE 4 MG/ML
VIAL (ML) INJECTION AS NEEDED
Status: DISCONTINUED | OUTPATIENT
Start: 2021-03-17 | End: 2021-03-17 | Stop reason: SURG

## 2021-03-17 RX ORDER — ONDANSETRON 2 MG/ML
4 INJECTION INTRAMUSCULAR; INTRAVENOUS EVERY 6 HOURS PRN
Status: DISCONTINUED | OUTPATIENT
Start: 2021-03-17 | End: 2021-03-22

## 2021-03-17 RX ADMIN — ROCURONIUM BROMIDE 30 MG: 10 INJECTION, SOLUTION INTRAVENOUS at 14:51:00

## 2021-03-17 RX ADMIN — LIDOCAINE HYDROCHLORIDE 50 MG: 10 INJECTION, SOLUTION EPIDURAL; INFILTRATION; INTRACAUDAL; PERINEURAL at 14:47:00

## 2021-03-17 RX ADMIN — SODIUM CHLORIDE, SODIUM LACTATE, POTASSIUM CHLORIDE, CALCIUM CHLORIDE: 600; 310; 30; 20 INJECTION, SOLUTION INTRAVENOUS at 14:51:00

## 2021-03-17 RX ADMIN — SODIUM CHLORIDE, SODIUM LACTATE, POTASSIUM CHLORIDE, CALCIUM CHLORIDE: 600; 310; 30; 20 INJECTION, SOLUTION INTRAVENOUS at 15:39:00

## 2021-03-17 RX ADMIN — DEXAMETHASONE SODIUM PHOSPHATE 4 MG: 4 MG/ML VIAL (ML) INJECTION at 14:52:00

## 2021-03-17 RX ADMIN — NEOSTIGMINE METHYLSULFATE 5 MG: 1 INJECTION INTRAVENOUS at 16:13:00

## 2021-03-17 RX ADMIN — GLYCOPYRROLATE 0.8 MG: 0.2 INJECTION, SOLUTION INTRAMUSCULAR; INTRAVENOUS at 16:13:00

## 2021-03-17 NOTE — ANESTHESIA PREPROCEDURE EVALUATION
PRE-OP EVALUATION    Patient Name: Giovanni Roth    Pre-op Diagnosis: Obstruction, colon ([address] [J96.137]    Procedure(s):      Surgeon(s) and Role:     James Coleman MD - Primary    Pre-op vitals reviewed.   Temp: 98.6 °F (37 °C)  Pulse: 62  Resp: 20 HCl (ZOFRAN) injection 4 mg, 4 mg, Intravenous, Q6H PRN  [COMPLETED] Dicyclomine HCl (BENTYL) 10 MG/ML injection 10 mg, 10 mg, Intramuscular, Once  morphINE sulfate (PF) 2 MG/ML injection 2 mg, 2 mg, Intravenous, Q3H PRN  Butalbital-APAP-Caffeine (FIORICET (four) hours as needed for Headaches (at onset). , Disp: , Rfl: , 3/13/2021 at Unknown time  Calcium Carbonate-Vitamin D 600-400 MG-UNIT Oral Tab, Take 1 tablet by mouth daily. , Disp: , Rfl: , 3/13/2021 at Unknown time  Omega-3 Fatty Acids (FISH OIL OR), Ta of normal.   5. Right atrium: The atrium was mildly dilated. 6. Pulmonary arteries: PA peak pressure: 31mm Hg (S).      Impressions:  This study is compared with previous dated 07-02-05: Compared   to the prior study, there has been no significant interva Alcohol use: Not Currently      Alcohol/week: 0.0 - 2.0 standard drinks      Comment: social      Drug use: Unknown     Available pre-op labs reviewed.   Lab Results   Component Value Date    WBC 9.1 03/16/2021    WBC 7.74 03/03/2021    RBC 3.89 03/16/2021

## 2021-03-17 NOTE — PLAN OF CARE
Pre op teaching done. Consent obtained. Poc updated, pt verbalized understanding.   Problem: PAIN - ADULT  Goal: Verbalizes/displays adequate comfort level or patient's stated pain goal  Description: INTERVENTIONS:  - Encourage pt to monitor pain and reques

## 2021-03-17 NOTE — ANESTHESIA POSTPROCEDURE EVALUATION
[facility] Patient Status:  Inpatient   Age/Gender 60year old female MRN [id number]   [facility] SURGERY Attending [address] , Guru Preston [address] Day # 1 PCP Yariel Roberts MD       Anesthesia Post-op Note

## 2021-03-17 NOTE — ANESTHESIA PROCEDURE NOTES
Airway  Date/Time: 3/17/2021 2:56 PM  Urgency: elective      General Information and Staff    Patient location during procedure: OR  Anesthesiologist: Tawny Corea MD  Performed: anesthesiologist     Indications and Patient Condition  Indications for ai

## 2021-03-17 NOTE — PROGRESS NOTES
[facility] Hospitalist Progress Note                                                                   [address] [facility]  9/26/1955    SUBJECTIVE:  Pt seen and examined.   She underwent c-scope yeste • Sertraline HCl  100 mg Oral Daily   • atorvastatin  20 mg Oral Nightly   • Verapamil HCl ER  240 mg Oral Daily   • meropenem  500 mg Intravenous Q8H   • Heparin Sodium (Porcine)  5,000 Units Subcutaneous Q8H [facility]     Continuous Infusions:   • sodium chl

## 2021-03-17 NOTE — PROGRESS NOTES
[facility]  Progress Note    Cezar Poe Patient Status:  Inpatient    1955 MRN [id number]   [facility] 3NW-A Attending Promise Archibald DO   Hosp Day # 1 PCP Tony Harrell MD     Subjective:    Patient reports c reference range     Blood type A+     Hyponatremia     Anemia     Metabolic alkalosis     Hyperglycemia     Viral URI     Dyspnea, unspecified type     Bronchospasm     Sjogren's syndrome with keratoconjunctivitis sicca (HCC)     Hand arthritis     Intract

## 2021-03-17 NOTE — ANESTHESIA PROCEDURE NOTES
[facility]  Performed by: Rui Banerjee MD  Authorized by: Rui Banerjee MD       General Information and Staff    Start Time:  3/17/2021 4:09 PM  End Time:  3/17/2021 4:14 PM  Anesthesiologist:  Rui Banerjee MD  Performed by:   Anesthesiologist

## 2021-03-17 NOTE — PLAN OF CARE
Pt a/ox4 upon assessment, room air, GERARDO w/cpap, NSR on telemetry. Cramping abd pain and mild nausea -meds given w/relief. Denies passing flatus. Bowel sounds hypoactive. Voiding freely. NPO after MN. Ivf and abx infusing. POC discussed and pt compliant.  Ca frequently for physical needs  - Identify cognitive and physical deficits and behaviors that affect risk of falls.   - [place] fall precautions as indicated by assessment.  - Educate pt/family on patient safety including physical limitations  - Instruct p

## 2021-03-18 LAB
ANION GAP SERPL CALC-SCNC: 4 MMOL/L (ref 0–18)
BASOPHILS # BLD AUTO: 0.02 X10(3) UL (ref 0–0.2)
BASOPHILS # BLD AUTO: 0.03 X10(3) UL (ref 0–0.2)
BASOPHILS NFR BLD AUTO: 0.2 %
BASOPHILS NFR BLD AUTO: 0.3 %
BUN BLD-MCNC: 11 MG/DL (ref 7–18)
BUN/CREAT SERPL: 20 (ref 10–20)
CALCIUM BLD-MCNC: 7.9 MG/DL (ref 8.5–10.1)
CHLORIDE SERPL-SCNC: 104 MMOL/L (ref 98–112)
CO2 SERPL-SCNC: 28 MMOL/L (ref 21–32)
CREAT BLD-MCNC: 0.55 MG/DL
DEPRECATED RDW RBC AUTO: 38.3 FL (ref 35.1–46.3)
DEPRECATED RDW RBC AUTO: 39.3 FL (ref 35.1–46.3)
EOSINOPHIL # BLD AUTO: 0 X10(3) UL (ref 0–0.7)
EOSINOPHIL # BLD AUTO: 0.01 X10(3) UL (ref 0–0.7)
EOSINOPHIL NFR BLD AUTO: 0 %
EOSINOPHIL NFR BLD AUTO: 0.1 %
ERYTHROCYTE [DISTWIDTH] IN BLOOD BY AUTOMATED COUNT: 12.5 % (ref 11–15)
ERYTHROCYTE [DISTWIDTH] IN BLOOD BY AUTOMATED COUNT: 12.6 % (ref 11–15)
GLUCOSE BLD-MCNC: 89 MG/DL (ref 70–99)
HAV IGM SER QL: 2.3 MG/DL (ref 1.6–2.6)
HCT VFR BLD AUTO: 28.2 %
HCT VFR BLD AUTO: 30.5 %
HGB BLD-MCNC: 10.1 G/DL
HGB BLD-MCNC: 9.1 G/DL
IMM GRANULOCYTES # BLD AUTO: 0.07 X10(3) UL (ref 0–1)
IMM GRANULOCYTES # BLD AUTO: 0.08 X10(3) UL (ref 0–1)
IMM GRANULOCYTES NFR BLD: 0.6 %
IMM GRANULOCYTES NFR BLD: 0.6 %
LYMPHOCYTES # BLD AUTO: 0.85 X10(3) UL (ref 1–4)
LYMPHOCYTES # BLD AUTO: 1.56 X10(3) UL (ref 1–4)
LYMPHOCYTES NFR BLD AUTO: 13.8 %
LYMPHOCYTES NFR BLD AUTO: 6.5 %
MCH RBC QN AUTO: 27.7 PG (ref 26–34)
MCH RBC QN AUTO: 27.8 PG (ref 26–34)
MCHC RBC AUTO-ENTMCNC: 32.3 G/DL (ref 31–37)
MCHC RBC AUTO-ENTMCNC: 33.1 G/DL (ref 31–37)
MCV RBC AUTO: 84 FL
MCV RBC AUTO: 85.7 FL
MONOCYTES # BLD AUTO: 0.72 X10(3) UL (ref 0.1–1)
MONOCYTES # BLD AUTO: 1.07 X10(3) UL (ref 0.1–1)
MONOCYTES NFR BLD AUTO: 5.5 %
MONOCYTES NFR BLD AUTO: 9.5 %
NEUTROPHILS # BLD AUTO: 11.38 X10 (3) UL (ref 1.5–7.7)
NEUTROPHILS # BLD AUTO: 11.38 X10(3) UL (ref 1.5–7.7)
NEUTROPHILS # BLD AUTO: 8.57 X10 (3) UL (ref 1.5–7.7)
NEUTROPHILS # BLD AUTO: 8.57 X10(3) UL (ref 1.5–7.7)
NEUTROPHILS NFR BLD AUTO: 75.7 %
NEUTROPHILS NFR BLD AUTO: 87.2 %
OSMOLALITY SERPL CALC.SUM OF ELEC: 281 MOSM/KG (ref 275–295)
PLATELET # BLD AUTO: 239 10(3)UL (ref 150–450)
PLATELET # BLD AUTO: 254 10(3)UL (ref 150–450)
POTASSIUM SERPL-SCNC: 4.1 MMOL/L (ref 3.5–5.1)
RBC # BLD AUTO: 3.29 X10(6)UL
RBC # BLD AUTO: 3.63 X10(6)UL
SODIUM SERPL-SCNC: 136 MMOL/L (ref 136–145)
WBC # BLD AUTO: 11.3 X10(3) UL (ref 4–11)
WBC # BLD AUTO: 13.1 X10(3) UL (ref 4–11)

## 2021-03-18 PROCEDURE — 85025 COMPLETE CBC W/AUTO DIFF WBC: CPT | Performed by: SURGERY

## 2021-03-18 PROCEDURE — 99213 OFFICE O/P EST LOW 20 MIN: CPT

## 2021-03-18 PROCEDURE — 83735 ASSAY OF MAGNESIUM: CPT | Performed by: INTERNAL MEDICINE

## 2021-03-18 PROCEDURE — 80048 BASIC METABOLIC PNL TOTAL CA: CPT | Performed by: INTERNAL MEDICINE

## 2021-03-18 PROCEDURE — 85025 COMPLETE CBC W/AUTO DIFF WBC: CPT | Performed by: INTERNAL MEDICINE

## 2021-03-18 NOTE — PAYOR COMM NOTE
--------------  CONTINUED STAY REVIEW    Payor: DANIELLE PPO  Subscriber #:  [id number]  Authorization Number: [id number]    Admit date: 3/16/21  Admit time:  9:52 AM    Admitting Physician: Stephania Vaz DO  Attending Physician:  Magaly Morales MD  [facility] 1642 Given 25 mcg Intravenous Mike Masters RN    3/17/2021 1629 Given 25 mcg Intravenous Mike Masters RN      gabapentin (NEURONTIN) cap 300 mg     Date Action Dose Route User    3/18/2021 0547 Given 300 mg Oral Mary Grace Caraballo RN      glycopyrrolate (RO Ainsley Galan RN    3/17/2021 1310 New Bag 500 mg Intravenous Angelic Maza RN      Metoprolol Succinate ER (Toprol XL) 24 hr tab 25 mg     Date Action Dose Route User    3/18/2021 0944 Given 25 mg Oral Damaris Glez RN      Montelukast Sodium (SIN Intravenous Chaz Finley RN      succinylcholine chloride (ANECTINE) injection     Date Action Dose Route User    3/17/2021 1447 Given 80 mg Intravenous Piedad López MD      Verapamil HCl ER (CALAN-SR) CR tab 240 mg     Date Action Dose Route User

## 2021-03-18 NOTE — PROGRESS NOTES
[facility] Hospitalist Progress Note                                                                   [address] [facility]  9/26/1955    SUBJECTIVE: Ms. Ambrocio Ramirez feels well today.  Some soreness from tee Oral Nightly   • Pantoprazole Sodium  40 mg Oral BID AC   • Sertraline HCl  100 mg Oral Daily   • atorvastatin  20 mg Oral Nightly   • Verapamil HCl ER  240 mg Oral Daily   • meropenem  500 mg Intravenous Q8H   • Heparin Sodium (Porcine)  5,000 Units Subcu protocol  --clear liquid diet, advance diet per surgery  --ambualte, subcutaneous heparin, IS    Dispo: ADOD 1-2 days to home pending postoperative course.

## 2021-03-18 NOTE — CM/SW NOTE
03/18/21 1400   CM/SW Referral Data   Referral Source Physician   Reason for Referral Discharge planning   Informant Patient; Children   Patient Info   Patient's Mental Status Alert;Oriented   Patient's [address]   Number of Levels in Home 1

## 2021-03-18 NOTE — DIETARY NOTE
[facility]    NUTRITION ASSESSMENT    Pt does not meet malnutrition criteria.     NUTRITION INTERVENTION:    1. RD nutrition Care Plan- Monitor need for ONS (oral nutritional supplements), Educated patient/family on colostomy diet guidelines and Provid No  Cultural/Ethnic/Synagogue Preferences Addresses: Yes    GI SYSTEM REVIEW: abdominal pain    NUTRITION RELATED PHYSICAL FINDINGS:     1. Body Fat/Muscle Mass: well nourished      2.  Fluid Accumulation: no    NUTRITION PRESCRIPTION:   Calories: 7414-4469

## 2021-03-18 NOTE — CONSULTS
[facility]  Report of Inpatient Ostomy Consultation     Porfirio Lagunas Patient Status:  Inpatient    1955 MRN [id number]   [facility] 3NW-A 320/320-A Attending Shanice Dawn MD   Hosp Day # 2 PCP Boris Aranda MD by Cierra Davila MD at [facility] MAIN OR   • COLONOSCOPY     • COLONOSCOPY  4/12/19= Diverticulosis    Repeat 2029   • COLONOSCOPY N/A 4/12/2019    Performed by Elias Ye MD at [facility] ENDOSCOPY   • COLONOSCOPY WITH DECOMPRESSION N/A 3/16/2021    Performed Pouching system:one piece  Able to care for stoma: No      Assessment of Learning Readiness:  Barriers/Limitations:  Pain      Type of ostomy:  colostomy    Post-Operative Teaching:  DEMO RETURN DEMONSTRATION     Remove and re-apply clamp  no no   Empty/

## 2021-03-18 NOTE — PROGRESS NOTES
[facility]                INFECTIOUS DISEASE PROGRESS NOTE    Romeo Chiu Patient Status:  Inpatient    1955 MRN [id number]   [facility] 3NW-A Attending Miguel Plascencia DO   Hosp Day # 2 PCP MATTY Cruz 8.8   < > 8.3* 8.0* 7.9*   ALB 3.8  --  3.8  --   --   --   --    *   < > 134*   < > 136 136 136   K 3.9   < > 3.8   < > 3.5 3.6 4.1      < > 102   < > 102 103 104   CO2 27.0   < > 28.0   < > 29.0 27.0 28.0   ALKPHO 116  --  108  --   --   --

## 2021-03-18 NOTE — PROGRESS NOTES
[facility]  Progress Note    Devi Grewal Patient Status:  Inpatient    1955 MRN [id number]   [facility] 3NW-A Attending Layla London DO   Hosp Day # 2 PCP Bhavesh Isaac MD     Subjective:  Patient is doing we

## 2021-03-18 NOTE — PROGRESS NOTES
[facility]    Progress Note    Cameronmary Sen Patient Status:  Inpatient    1955 MRN [id number]   [facility] 3NW-A Attending Glory Guzmán MD   Hosp Day # 2 PCP Becky Bodwen MD     Subjective:  Varinder Chatterjee is URI     Dyspnea, unspecified type     Bronchospasm     Sjogren's syndrome with keratoconjunctivitis sicca (HCC)     Hand arthritis     Intractable abdominal pain     Colitis     Obstipation      61 y/o with impacted stool ball in the sigmoid unable to be b

## 2021-03-18 NOTE — PLAN OF CARE
Problem: Patient/Family Goals  Goal: Patient/Family Long Term Goal  Description: Patient's Long Term Goal: Discharge    Interventions:  - Tolerate a diet   - See additional Care Plan goals for specific interventions  Outcome: Not Progressing  Goal: Patie devices as appropriate  - Consider OT/PT consult to assist with strengthening/mobility  - Encourage toileting schedule  Outcome: Progressing     Problem: DISCHARGE PLANNING  Goal: Discharge to home or other facility with appropriate resources  Description:

## 2021-03-18 NOTE — OPERATIVE REPORT
[facility]    PATIENT'S NAME: Anynicolas Huerta   ATTENDING PHYSICIAN: Kal Dinero DO   OPERATING PHYSICIAN: Felecia Trujillo M.D.    PATIENT ACCOUNT#:   [de-identified]    LOCATION:  [address] [place] [address]  MEDICAL RECORD #:   [id number]       DATE OF BIRTH:  [birth year] small bowel was all normal in caliber. It was packed with lap sponges cephalad, and then the pelvic area was explored, and upon identifying the sigmoid colon, there was a large stool that was causing the obstruction.   Some of the adhesions to the distal s

## 2021-03-18 NOTE — HOME CARE LIAISON
[facility] rec'd referral from [address] [place] [address] to offer choice and HH on dc.   Due to staffing [facility] is unable to accept this referral at this time    Thanks  Brien Lorenz

## 2021-03-18 NOTE — PLAN OF CARE
Pt a/ox4 upon assessment, pale skin color, 3LNC, NSR on telemetry. Colostomy LQ site leaking bloody drainage - appliance changed 3x and reinforcement applied per MD. Stoma red and moist draining stool and bloody drainage.  Midline w/aquacel leaking and rein Problem: SAFETY ADULT - FALL  Goal: Free from fall injury  Description: INTERVENTIONS:  - Assess pt frequently for physical needs  - Identify cognitive and physical deficits and behaviors that affect risk of falls.   - [place] fall precautions as indica

## 2021-03-19 LAB
ANION GAP SERPL CALC-SCNC: 4 MMOL/L (ref 0–18)
BILIRUB UR QL STRIP.AUTO: NEGATIVE
BUN BLD-MCNC: 10 MG/DL (ref 7–18)
BUN/CREAT SERPL: 19.2 (ref 10–20)
CALCIUM BLD-MCNC: 7.8 MG/DL (ref 8.5–10.1)
CHLORIDE SERPL-SCNC: 105 MMOL/L (ref 98–112)
CLARITY UR REFRACT.AUTO: CLEAR
CO2 SERPL-SCNC: 27 MMOL/L (ref 21–32)
CREAT BLD-MCNC: 0.52 MG/DL
DEPRECATED RDW RBC AUTO: 39.7 FL (ref 35.1–46.3)
ERYTHROCYTE [DISTWIDTH] IN BLOOD BY AUTOMATED COUNT: 12.9 % (ref 11–15)
GLUCOSE BLD-MCNC: 87 MG/DL (ref 70–99)
GLUCOSE UR STRIP.AUTO-MCNC: NEGATIVE MG/DL
HCT VFR BLD AUTO: 25.1 %
HGB BLD-MCNC: 8.3 G/DL
KETONES UR STRIP.AUTO-MCNC: NEGATIVE MG/DL
LEUKOCYTE ESTERASE UR QL STRIP.AUTO: NEGATIVE
MCH RBC QN AUTO: 28.3 PG (ref 26–34)
MCHC RBC AUTO-ENTMCNC: 33.1 G/DL (ref 31–37)
MCV RBC AUTO: 85.7 FL
NITRITE UR QL STRIP.AUTO: NEGATIVE
OSMOLALITY SERPL CALC.SUM OF ELEC: 280 MOSM/KG (ref 275–295)
PH UR STRIP.AUTO: 6 [PH] (ref 5–8)
PLATELET # BLD AUTO: 201 10(3)UL (ref 150–450)
POTASSIUM SERPL-SCNC: 3.7 MMOL/L (ref 3.5–5.1)
PROT UR STRIP.AUTO-MCNC: NEGATIVE MG/DL
RBC # BLD AUTO: 2.93 X10(6)UL
RBC UR QL AUTO: NEGATIVE
SODIUM SERPL-SCNC: 136 MMOL/L (ref 136–145)
SP GR UR STRIP.AUTO: 1.01 (ref 1–1.03)
UROBILINOGEN UR STRIP.AUTO-MCNC: <2 MG/DL
WBC # BLD AUTO: 7.7 X10(3) UL (ref 4–11)

## 2021-03-19 PROCEDURE — 80048 BASIC METABOLIC PNL TOTAL CA: CPT | Performed by: HOSPITALIST

## 2021-03-19 PROCEDURE — 85027 COMPLETE CBC AUTOMATED: CPT | Performed by: HOSPITALIST

## 2021-03-19 PROCEDURE — 99213 OFFICE O/P EST LOW 20 MIN: CPT

## 2021-03-19 PROCEDURE — 81003 URINALYSIS AUTO W/O SCOPE: CPT | Performed by: HOSPITALIST

## 2021-03-19 RX ORDER — IBUPROFEN 600 MG/1
600 TABLET ORAL EVERY 6 HOURS PRN
Status: DISCONTINUED | OUTPATIENT
Start: 2021-03-19 | End: 2021-03-22

## 2021-03-19 NOTE — CM/SW NOTE
[facility] unable to staff this pt so referrals for [place] [address] sent out in [address] [place] [address] Ave- await responses.       Maximo Kohler LCSW  /Discharge Planner  [phone]

## 2021-03-19 NOTE — CM/SW NOTE
[facility]- they can see pt on Monday for first visit. Abhijit Souza LCSW  /Discharge Planner  [phone]     updated daughter and pt of above.   Sofia Leo, 03/19/21, 4:43 PM

## 2021-03-19 NOTE — PROGRESS NOTES
[facility] Hospitalist Progress Note                                                                   [address] [facility]  9/26/1955    SUBJECTIVE: Ms. Donell Seip having some increased pain today, some mg Oral Daily   • Montelukast Sodium  10 mg Oral Nightly   • Pantoprazole Sodium  40 mg Oral BID AC   • Sertraline HCl  100 mg Oral Daily   • atorvastatin  20 mg Oral Nightly   • Verapamil HCl ER  240 mg Oral Daily   • meropenem  500 mg Intravenous Q8H   • statin    GERD  --stable, continue PPI    FEN/Prophy  --dc IVFs  --replete lytes per protocol  --full liquid diet, advance diet as tolerated  --ambulate, subcutaneous heparin, IS    Dispo: ADOD 1-2 days to home pending postoperative course.

## 2021-03-19 NOTE — PROGRESS NOTES
[facility]  Report of Inpatient Ostomy Progress     Trenton Solis Patient Status:  Inpatient    1955 MRN [id number]   [facility] 3NW-A 320/320-A Attending Yonatan Roberts MD   Hosp Day # 3 PCP Margaret Red MD [facility] ENDOSCOPY   • COLONOSCOPY WITH DECOMPRESSION N/A 3/16/2021    Performed by Annella Homans, MD at [facility] ENDOSCOPY   • ESOPHAGOGASTRODUODENOSCOPY (EGD) N/A 4/12/2019    Performed by Donna Brink MD at [facility] ENDOSCOPY   • EXPLORATORY LAPAROTOMY, SIGMOID RESECTION no    Assessment of Learning Readiness:  Barriers/Limitations:  Pain      Type of ostomy:  colostomy    Post-Operative Teaching:  DEMO RETURN DEMONSTRATION     Remove and re-apply clamp  yes no   Empty/clean pouch  yes no   Measure/cut stomal opening  yes

## 2021-03-19 NOTE — PLAN OF CARE
Problem: Patient/Family Goals  Goal: Patient/Family Long Term Goal  Description: Patient's Long Term Goal: Discharge    Interventions:  - Tolerate a diet   - See additional Care Plan goals for specific interventions  Outcome: Progressing  Goal: Patient/F as appropriate  - Consider OT/PT consult to assist with strengthening/mobility  - Encourage toileting schedule  Outcome: Progressing     Problem: DISCHARGE PLANNING  Goal: Discharge to home or other facility with appropriate resources  Description: Ramonita Paula

## 2021-03-19 NOTE — PAYOR COMM NOTE
--------------  CONTINUED STAY REVIEW------REQUESTING ADDITIONAL DAY 3/19      Payor: [facility] ARYAN GARVIN  Subscriber #:  [id number]  Authorization Number: [id number]    Admit date: 3/16/21  Admit time:  9:52 AM    Admitting Physician: DO Pieter Pineda 7. 9* 7.8*   MG  --   --   --  2.3  --    GLU 92 82 80 89 87              Recent Labs   Lab 03/13/21  0457 03/14/21  1827   ALT 30 28   AST 20 23   ALB 3.8 3.8         No results for input(s): PGLU in the last 168 hours.     Meds:   Scheduled:   • aspirin monitor with repeat CBC in AM     Leukocytosis  --acute stress reaction  --treating UTI as above  --resolved today, monitor with repeat CBC in AM     Sjogren's syndrome   --stable, continue current medications     Migraine syndrome   --stable, continue cur Sodium tab 88 mcg     Date Action Dose Route User    3/19/2021 0842 Given 88 mcg Oral Agnieszka Cage, RN      Meropenem (MERREM) 500 mg in sodium chloride 0.9% 100 mL IVPB-MBP     Date Action Dose Route User    3/19/2021 1155 New Bag 500 mg Intravenous Burns Maha

## 2021-03-19 NOTE — PROGRESS NOTES
[facility]                INFECTIOUS DISEASE PROGRESS NOTE    Shira Payne Patient Status:  Inpatient    1955 MRN [id number]   [facility] 3NW-A Attending Rosalia Mora DO   Hosp Day # 3 PCP MATTY Bhatti BUN 19*   < > 26*   < > 10 11 10   CREATSERUM 0.88   < > 1.28*   < > 0.68 0.55 0.52*   GFRAA 80   < > 51*   < > 107 115 117   GFRNAA 70   < > 44*   < > 93 99 101   CA 8.8   < > 8.8   < > 8.0* 7.9* 7.8*   ALB 3.8  --  3.8  --   --   --   --    *   < impaction    2. UTI E.  Coli ESBL on meropenem  Completed abx  UA today negative    Please call back if need arises      Will Copeland MD  [facility]  [phone]

## 2021-03-19 NOTE — PLAN OF CARE
Patient A&Ox4, VSS, Sinus lex overnight, asymptomatic, CPAP on overnight. C/o mild abdominal discomfort, managed with prn medication. IVF/abx continued. Colostomy in place, stoma red/moist, appliance changed x1 this shift.  Midline incision aquacel with s appropriate  Outcome: Progressing     Problem: RISK FOR INFECTION - ADULT  Goal: Absence of fever/infection during anticipated neutropenic period  Description: INTERVENTIONS  - Monitor WBC  - Administer growth factors as ordered  - Implement neutropenic gu

## 2021-03-19 NOTE — PROGRESS NOTES
[facility]  Progress Note    Romeo Chiu Patient Status:  Inpatient    1955 MRN [id number]   [facility] 3NW-A Attending Troy Garland MD   Hosp Day # 3 PCP Nicci Dial MD     Subjective:  Patient is awake and al

## 2021-03-20 LAB
ANION GAP SERPL CALC-SCNC: 2 MMOL/L (ref 0–18)
BUN BLD-MCNC: 5 MG/DL (ref 7–18)
BUN/CREAT SERPL: 10.4 (ref 10–20)
CALCIUM BLD-MCNC: 8.4 MG/DL (ref 8.5–10.1)
CHLORIDE SERPL-SCNC: 104 MMOL/L (ref 98–112)
CO2 SERPL-SCNC: 31 MMOL/L (ref 21–32)
CREAT BLD-MCNC: 0.48 MG/DL
DEPRECATED RDW RBC AUTO: 39.9 FL (ref 35.1–46.3)
ERYTHROCYTE [DISTWIDTH] IN BLOOD BY AUTOMATED COUNT: 12.9 % (ref 11–15)
GLUCOSE BLD-MCNC: 84 MG/DL (ref 70–99)
HCT VFR BLD AUTO: 26.3 %
HGB BLD-MCNC: 8.6 G/DL
MCH RBC QN AUTO: 28.1 PG (ref 26–34)
MCHC RBC AUTO-ENTMCNC: 32.7 G/DL (ref 31–37)
MCV RBC AUTO: 85.9 FL
OSMOLALITY SERPL CALC.SUM OF ELEC: 280 MOSM/KG (ref 275–295)
PLATELET # BLD AUTO: 235 10(3)UL (ref 150–450)
POTASSIUM SERPL-SCNC: 3.3 MMOL/L (ref 3.5–5.1)
RBC # BLD AUTO: 3.06 X10(6)UL
SODIUM SERPL-SCNC: 137 MMOL/L (ref 136–145)
WBC # BLD AUTO: 6.4 X10(3) UL (ref 4–11)

## 2021-03-20 PROCEDURE — 80048 BASIC METABOLIC PNL TOTAL CA: CPT | Performed by: HOSPITALIST

## 2021-03-20 PROCEDURE — 85027 COMPLETE CBC AUTOMATED: CPT | Performed by: HOSPITALIST

## 2021-03-20 RX ORDER — POTASSIUM CHLORIDE 20 MEQ/1
40 TABLET, EXTENDED RELEASE ORAL ONCE
Status: COMPLETED | OUTPATIENT
Start: 2021-03-20 | End: 2021-03-20

## 2021-03-20 NOTE — PROGRESS NOTES
[facility]  Progress Note    Parrish Crowder Patient Status:  Inpatient    1955 MRN [id number]   [facility] 3NW-A Attending Radha Andre MD   Hosp Day # 4 PCP Soco Zepeda MD     Subjective:  Patient is tolerating d

## 2021-03-20 NOTE — PROGRESS NOTES
[facility] Hospitalist Progress Note     [facility]      SUBJECTIVE:  Noting a lot of pain this morning  Is having some ostomy output    OBJECTIVE:  Temp:  [97.8 °F (36.6 °C)-98.6 °F (37 °C)] 97.8 °F (36.6 °C)  Pulse:  [64-77] 66  Resp:  [18-20] 18  BP: (1 ORAL (ER), 3/13/2021, 6:33 AM.  INDICATIONS:  use GASTROGRAFFIN enema NOT barium. pt with severe constipation - r/o obstruction. PATIENT STATED HISTORY: (As transcribed by Technologist)  Patient has severe constipation.  Her last bowel movement was 5 days Patient complains of LLQ pain that radiates to back for 1 day. CONTRAST USED:  100cc of Omnipaque 350  FINDINGS:  LIVER:  No enlargement, atrophy, abnormal density, or significant focal lesion. Simple appearing cyst in the right lobe measures 14 mm.  ADONAY 2. Mild wall thickening involving the right aspect of the urinary bladder can be seen with infectious cystitis.   Findings discussed with the ER by the vision radiologist.   Dictated by (CST): Gabriel Chahal MD on 3/13/2021 at 7:20 AM     Finalized by (CST): tablet, Oral, Q4H PRN         Assessment/Plan:     Ms. Sri Farmer is a 59 yo female who presented with large bowel obstruction due to fecal impaction     # Large bowel obstruction due to fecal impaction  - GI evaluated and attempted to disimpact with colonosc

## 2021-03-20 NOTE — PLAN OF CARE
Pt axo4, pain 8/10 gave pain meds, isolation for VRE, abdominal incision with abd pad small amount of draining, and left sided ostomy, draining brown semi formed stool, patient voiding clear yellow urine.   Pt L hand PIV saline locked, [facility], fall precautions as indicated by assessment.  - Educate pt/family on patient safety including physical limitations  - Instruct pt to call for assistance with activity based on assessment  - Modify environment to reduce risk of injury  - Provide assistive d

## 2021-03-20 NOTE — PROGRESS NOTES
Vss. Pt a&ox3. Pt on ra with 02 sats wnl. Lungs cta. Pt denies difficulty breathing or sob. Pt denies chest pain. Denies n/v. Tolerating low residue diet well for breakfast. Pt passing some flatus and small amount of loose stool through colostomy.  Stoma pi

## 2021-03-21 LAB
ANION GAP SERPL CALC-SCNC: 4 MMOL/L (ref 0–18)
BUN BLD-MCNC: 6 MG/DL (ref 7–18)
BUN/CREAT SERPL: 10.3 (ref 10–20)
CALCIUM BLD-MCNC: 8.8 MG/DL (ref 8.5–10.1)
CHLORIDE SERPL-SCNC: 102 MMOL/L (ref 98–112)
CO2 SERPL-SCNC: 32 MMOL/L (ref 21–32)
CREAT BLD-MCNC: 0.58 MG/DL
DEPRECATED RDW RBC AUTO: 40.7 FL (ref 35.1–46.3)
ERYTHROCYTE [DISTWIDTH] IN BLOOD BY AUTOMATED COUNT: 12.9 % (ref 11–15)
GLUCOSE BLD-MCNC: 83 MG/DL (ref 70–99)
HCT VFR BLD AUTO: 28.2 %
HGB BLD-MCNC: 9 G/DL
MCH RBC QN AUTO: 27.8 PG (ref 26–34)
MCHC RBC AUTO-ENTMCNC: 31.9 G/DL (ref 31–37)
MCV RBC AUTO: 87 FL
OSMOLALITY SERPL CALC.SUM OF ELEC: 283 MOSM/KG (ref 275–295)
PLATELET # BLD AUTO: 262 10(3)UL (ref 150–450)
POTASSIUM SERPL-SCNC: 3.6 MMOL/L (ref 3.5–5.1)
RBC # BLD AUTO: 3.24 X10(6)UL
SODIUM SERPL-SCNC: 138 MMOL/L (ref 136–145)
WBC # BLD AUTO: 6.3 X10(3) UL (ref 4–11)

## 2021-03-21 PROCEDURE — 80048 BASIC METABOLIC PNL TOTAL CA: CPT | Performed by: HOSPITALIST

## 2021-03-21 PROCEDURE — 85027 COMPLETE CBC AUTOMATED: CPT | Performed by: HOSPITALIST

## 2021-03-21 RX ORDER — GABAPENTIN 300 MG/1
CAPSULE ORAL
Qty: 360 CAPSULE | Refills: 1 | Status: SHIPPED | COMMUNITY
Start: 2021-03-21 | End: 2021-07-14 | Stop reason: ALTCHOICE

## 2021-03-21 RX ORDER — SIMETHICONE 80 MG
80 TABLET,CHEWABLE ORAL 4 TIMES DAILY PRN
Status: DISCONTINUED | OUTPATIENT
Start: 2021-03-21 | End: 2021-03-22

## 2021-03-21 NOTE — PROGRESS NOTES
[facility] Hospitalist Progress Note     [facility]      SUBJECTIVE:  Continues to have cramping pain  Small amount of ostomy output    OBJECTIVE:  Temp:  [97.3 °F (36.3 °C)-98.1 °F (36.7 °C)] 98.1 °F (36.7 °C)  Pulse:  [53-76] 69  Resp:  [18] 18  BP: (110 ORAL (ER), 3/13/2021, 6:33 AM.  INDICATIONS:  use GASTROGRAFFIN enema NOT barium. pt with severe constipation - r/o obstruction. PATIENT STATED HISTORY: (As transcribed by Technologist)  Patient has severe constipation.  Her last bowel movement was 5 days Patient complains of LLQ pain that radiates to back for 1 day. CONTRAST USED:  100cc of Omnipaque 350  FINDINGS:  LIVER:  No enlargement, atrophy, abnormal density, or significant focal lesion. Simple appearing cyst in the right lobe measures 14 mm.  ADONAY 2. Mild wall thickening involving the right aspect of the urinary bladder can be seen with infectious cystitis.   Findings discussed with the ER by the vision radiologist.   Dictated by (CST): Carmen Valerio MD on 3/13/2021 at 7:20 AM     Finalized by (CST): Subcutaneous, Q8H [address]  acetaminophen (TYLENOL) tab 650 mg, 650 mg, Oral, Q6H PRN  Butalbital-APAP-Caffeine (FIORICET) per tab 1 tablet, 1 tablet, Oral, Q4H PRN         Assessment/Plan:     Ms. Yemi Oates is a 61 yo female who presented with large bowel obstru

## 2021-03-21 NOTE — PLAN OF CARE
Pt a&o x 4. Cheerful & cooperative w/ care  [place] precautions maintained  Colostomy producing soft brown stool  Voiding w/out difficulty  Norco for pain  Up ad amanda  Surgical staples cdi, well approximated & hallie.            Problem: Patient/Family Goals  Goal guidelines  3/21/2021 1332 by Adam Portillo RN  Outcome: Progressing  3/21/2021 1331 by Adam Portillo RN  Outcome: Progressing     Problem: SAFETY ADULT - FALL  Goal: Free from fall injury  Description: INTERVENTIONS:  - Assess pt frequently for ph site(s) healing without S/S of infection  Description: INTERVENTIONS:  - Assess and document risk factors for pressure ulcer development  - Assess and document skin integrity  - Assess and document dressing/incision, wound bed, drain sites and surrounding

## 2021-03-21 NOTE — PLAN OF CARE
Pt is A&O, VSS, with moderate c/o pain to abdomen- medication given. Pt is on RA with bilateral clear diminished lung sounds, . GERARDO with CPAP. NSR on telemetry. Abdomen is soft and tender with active bowel sounds.  LLQ colostomy bag putting out gas and l injury  Description: INTERVENTIONS:  - Assess pt frequently for physical needs  - Identify cognitive and physical deficits and behaviors that affect risk of falls.   - [place] fall precautions as indicated by assessment.  - Educate pt/family on patient sa

## 2021-03-21 NOTE — PROGRESS NOTES
[facility]  Progress Note    Messi Spencer Patient Status:  Inpatient    1955 MRN [id number]   [facility] 3NW-A Attending José Miguel Causey MD   Hosp Day # 5 PCP Freeman Hayes MD     Subjective:  Patient is tolerating

## 2021-03-22 VITALS
SYSTOLIC BLOOD PRESSURE: 146 MMHG | OXYGEN SATURATION: 98 % | HEART RATE: 60 BPM | HEIGHT: 67 IN | WEIGHT: 170 LBS | DIASTOLIC BLOOD PRESSURE: 75 MMHG | BODY MASS INDEX: 26.68 KG/M2 | RESPIRATION RATE: 18 BRPM | TEMPERATURE: 98 F

## 2021-03-22 RX ORDER — SIMETHICONE 80 MG
80 TABLET,CHEWABLE ORAL 3 TIMES DAILY PRN
Qty: 15 TABLET | Refills: 0 | Status: SHIPPED | OUTPATIENT
Start: 2021-03-22

## 2021-03-22 RX ORDER — HYDROCODONE BITARTRATE AND ACETAMINOPHEN 5; 325 MG/1; MG/1
1 TABLET ORAL EVERY 4 HOURS PRN
Qty: 20 TABLET | Refills: 0 | Status: SHIPPED | OUTPATIENT
Start: 2021-03-22 | End: 2021-04-01

## 2021-03-22 RX ORDER — ONDANSETRON 4 MG/1
4 TABLET, FILM COATED ORAL EVERY 8 HOURS PRN
Qty: 20 TABLET | Refills: 0 | Status: SHIPPED | OUTPATIENT
Start: 2021-03-22 | End: 2021-03-29

## 2021-03-22 NOTE — PROGRESS NOTES
NURSING DISCHARGE NOTE    Discharged Home via Wheelchair. Accompanied by Support staff  Belongings Taken by patient/family. PT DC VIA WHEELCHAIR IN STABLE CONDITION. REVIEWED DC INSTRUCTIONS WITH PT, SHE UNDERSTOOD AND HAD NO FURTHER QUESTIONS.  REM

## 2021-03-22 NOTE — PLAN OF CARE
Problem: Patient/Family Goals  Goal: Patient/Family Long Term Goal  Description: Patient's Long Term Goal: Discharge    Interventions:  - Tolerate a diet   - See additional Care Plan goals for specific interventions  Outcome: Progressing  Goal: Patient/F as appropriate  - Consider OT/PT consult to assist with strengthening/mobility  - Encourage toileting schedule  Outcome: Progressing     Problem: DISCHARGE PLANNING  Goal: Discharge to home or other facility with appropriate resources  Description: Mark Aviles

## 2021-03-22 NOTE — PROGRESS NOTES
[facility]  Progress Note    Denton Nearing Patient Status:  Inpatient    1955 MRN [id number]   [facility] 3NW-A Attending Yina King MD   Hosp Day # 6 PCP Malia Alcala MD     Subjective:    Patient reports some y/o S/P:  Exploratory laparotomy, sigmoid resection, and colostomy    Plan:    Reviewed postop restrictions  Ok for dc today  F/u in office in 1 week  All questions answered    Hailey Rockwell [address] Surgery  3/22/2021

## 2021-03-22 NOTE — PROGRESS NOTES
Pt resting in bed, easy non labored breathing on ra. cpox and telemetry in place. Vs wnl. Up ad amanda. Steady gait. Iv hl. Voids adequate amount. Midline abd. Incision with abd dressing and tape. C/d/i. Colostomy with yelllowish drainage in appliance. Pt nicky

## 2021-03-22 NOTE — PAYOR COMM NOTE
--------------  CONTINUED STAY REVIEW------REQUESTING ADDITIONAL DAYS 3/20, 3/21      Payor: DANIELLE GARVIN  Subscriber #:  [id number]  Authorization Number: [id number]    Admit date: 3/16/21  Admit time:  9:52 AM    Admitting Physician: Mac Cooper DO AST 23   ALB 3.8         No results for input(s): PGLU in the last 168 hours.     Imaging:  XR COLON SINGLE CONTRAST (CPT=74270)     Result Date: 3/16/2021  PROCEDURE:  XR COLON SINGLE CONTRAST (CPT=74270)  TECHNIQUE:  A single contrast barium enema exam performed from the dome of the diaphragm to the pubic symphysis with non-ionic intravenous contrast material. Post contrast coronal MPR imaging was performed. Dose reduction techniques were used.  Dose information is transmitted to the ACR ([employer] for patient age. BONES:  No bony lesion or fracture. LUNG BASES:  No visible pulmonary or pleural disease. OTHER:  Negative.              CONCLUSION:   1.   Moderate length of wall thickening and pericolonic inflammatory change involving the splenic flex 40 mg, Oral, BID AC  Sertraline HCl (ZOLOFT) tab 100 mg, 100 mg, Oral, Daily  atorvastatin (LIPITOR) tab 20 mg, 20 mg, Oral, Nightly  Verapamil HCl ER (CALAN-SR) CR tab 240 mg, 240 mg, Oral, Daily  Heparin Sodium (Porcine) 5000 UNIT/ML injection 5,000 Unit pain  Small amount of ostomy output     OBJECTIVE:  Temp:  [97.3 °F (36.3 °C)-98.1 °F (36.7 °C)] 98.1 °F (36.7 °C)  Pulse:  [53-76] 69  Resp:  [18] 18  BP: (110-144)/(58-77) 110/58     Intake/Output:     Intake/Output Summary (Last 24 hours) at 3/21/2021 1 GASTROGRAFFIN enema NOT barium. pt with severe constipation - r/o obstruction. PATIENT STATED HISTORY: (As transcribed by Technologist)  Patient has severe constipation. Her last bowel movement was 5 days ago.  She has bilateral upper quadrant abdominal p back for 1 day. CONTRAST USED:  100cc of Omnipaque 350  FINDINGS:  LIVER:  No enlargement, atrophy, abnormal density, or significant focal lesion. Simple appearing cyst in the right lobe measures 14 mm. BILIARY:  No visible dilatation or calcification. aspect of the urinary bladder can be seen with infectious cystitis.   Findings discussed with the ER by the vision radiologist.   Dictated by (CST): George Cuellar MD on 3/13/2021 at 7:20 AM     Finalized by (CST): George Cuellar MD on 3/13/2021 at 7:29 AM (TYLENOL) tab 650 mg, 650 mg, Oral, Q6H PRN  Butalbital-APAP-Caffeine (FIORICET) per tab 1 tablet, 1 tablet, Oral, Q4H PRN            Assessment/Plan:      Ms. Sandie Mccall is a 59 yo female who presented with large bowel obstruction due to fecal impaction    Action Dose Route User    3/22/2021 0985 Given 300 mg Oral Mildred Briggs, RN      Heparin Sodium (Porcine) 5000 UNIT/ML injection 5,000 Units     Date Action Dose Route User    3/22/2021 1273 Given 5,000 Units Subcutaneous (Left Lower Abdomen) Fermín Arriaga

## 2021-03-22 NOTE — DISCHARGE SUMMARY
General Medicine Discharge Summary     Patient ID:  Rima Ortega  60year old  9/26/1955    Admit date: 3/14/2021    Discharge date and time: 3/22/21    Attending Physician: Sarabjit Valdez MD     Primary Care Physician: Jeison Schwartz MD HOSPITALIST  IP CONSULT TO INFECTIOUS DISEASE  IP CONSULT TO GENERAL SURGERY  CONSULT TO WOUND OSTOMY  IP CONSULT TO SOCIAL WORK    Operative Procedures: Procedure(s) (LRB):  EXPLORATORY LAPAROTOMY, SIGMOID RESECTION AND OSTOMY (N/A)     Disposition: home aspirin 81 MG Oral Chew Tab  Chew 1 tablet (81 mg total) by mouth daily. Albuterol Sulfate HFA (PROAIR HFA) 108 (90 Base) MCG/ACT Inhalation Aero Soln  Inhale 2 puffs into the lungs every 6 (six) hours as needed for Wheezing.     Ferrous Sulfate 325

## 2021-03-22 NOTE — CM/SW NOTE
RN states pt is ready for discharge. SW sent AVS to [facility], and made them aware of pt's discharge.

## 2021-03-23 NOTE — PAYOR COMM NOTE
--------------  DISCHARGE REVIEW    Payor: DANIELLE GARVIN  Subscriber #:  [id number]  Authorization Number: [id number]    Admit date: 3/16/21  Admit time:   9:52 AM  Discharge Date: 3/22/2021  5:36 PM     Admitting Physician: Radhames Tam DO  Attending Ph culture with ESBL E. Coli UTI  - completed IV meropenem per ID recs  - repeat UA --> negative for sign of infection     # Acute blood loss anemia  - as expected in postoperative period  - Hgb stable this AM     # Leukocytosis  - acute stress reaction and U total) by mouth daily. simvastatin 40 MG Oral Tab  Take 1 tablet (40 mg total) by mouth nightly. Pantoprazole Sodium 40 MG Oral Tab EC  Take 1 tablet (40 mg total) by mouth 2 (two) times daily before meals.     Estradiol 0.1 MG/GM Vaginal Cream  Place

## 2021-03-23 NOTE — CM/SW NOTE
03/23/21 1300   Discharge disposition   Expected discharge disposition Home-Health   Name of Maciej Sandoval 70   Discharge transportation Private car   Pt d/c'd 3/22 with [facility] via private vehicle.

## 2021-03-25 ENCOUNTER — TELEPHONE (OUTPATIENT)
Dept: WOUND CARE | Facility: HOSPITAL | Age: 66
End: 2021-03-25

## 2021-03-25 NOTE — TELEPHONE ENCOUNTER
[facility]  Outpatient Wound Care Contact Note  Checked in with patient to see if she has any questions or concerns regarding ostomy care. Patient states home health is coming today so they can change it together.  She states she has no questions or co

## 2021-04-29 ENCOUNTER — HOSPITAL ENCOUNTER (EMERGENCY)
Facility: HOSPITAL | Age: 66
Discharge: HOME | End: 2021-04-29
Attending: EMERGENCY MEDICINE
Payer: COMMERCIAL

## 2021-04-29 VITALS
TEMPERATURE: 97 F | HEART RATE: 61 BPM | HEIGHT: 67 IN | RESPIRATION RATE: 16 BRPM | BODY MASS INDEX: 25.9 KG/M2 | DIASTOLIC BLOOD PRESSURE: 76 MMHG | OXYGEN SATURATION: 100 % | SYSTOLIC BLOOD PRESSURE: 137 MMHG | WEIGHT: 165 LBS

## 2021-04-29 DIAGNOSIS — Z98.890 POST-OPERATIVE STATE: Primary | ICD-10-CM

## 2021-04-29 PROCEDURE — 80053 COMPREHEN METABOLIC PANEL: CPT | Performed by: EMERGENCY MEDICINE

## 2021-04-29 PROCEDURE — 96360 HYDRATION IV INFUSION INIT: CPT

## 2021-04-29 PROCEDURE — 99284 EMERGENCY DEPT VISIT MOD MDM: CPT

## 2021-04-29 PROCEDURE — 85025 COMPLETE CBC W/AUTO DIFF WBC: CPT | Performed by: EMERGENCY MEDICINE

## 2021-04-29 RX ORDER — SODIUM CHLORIDE 9 MG/ML
125 INJECTION, SOLUTION INTRAVENOUS CONTINUOUS
Status: DISCONTINUED | OUTPATIENT
Start: 2021-04-29 | End: 2021-04-29

## 2021-04-29 NOTE — ED PROVIDER NOTES
Patient Seen in: [facility] Emergency Department      History   Patient presents with:  Rectal Problem    Stated Complaint: stool coming from rectum x 4 today, had colostomy placed 4-5 weeks ago for daniel*    HPI/Subjective:   HPI    This is a 60 fem 130/80 8/18/2011   • Hyperlipidemia LDL goal < 100 7/18/2011   • Migraine 7/18/2001   • GERARDO (obstructive sleep apnea) 9/11/2020 PSG    AHI 10 RDI 11 REM AHI 9 Supine AHI 17 non-supine AHI 8 Sao2 Liam 63%    • Osteopenia 12/1/2011   • PONV (postoperative n as noted in HPI. Constitutional and vital signs reviewed. All other systems reviewed and negative except as noted above.     Physical Exam     ED Triage Vitals [04/29/21 1206]   /85   Pulse 77   Resp 16   Temp 96.8 °F (36 °C)   Temp src Temporal obtained. CBC: White blood cell count 6.8. Hemoglobin 11. Platelet 702. CMP: 113. Creatinine 0.8. Glucose 90. Bicarb 26. Rapid Covid negative    Discussed case with Dr. Junior Rose.   She states passing stool per rectum is normal.  She had copious

## 2021-04-29 NOTE — ED INITIAL ASSESSMENT (HPI)
Pt states having colostomy surgery 5 weeks ago. Pt states today stool was coming out of her rectum instead of into the colostomy bag.

## 2021-05-18 NOTE — ADDENDUM NOTE
Addendum  created 05/18/21 1075 by Ant Land MD    Clinical Note Signed, Intraprocedure Blocks edited

## 2021-06-15 NOTE — PLAN OF CARE
Pt back from pacu. Pt states pain os tolerable at the moment. No nausea. Colostomy with scant bloody drainage. Lower midline incision oozing, per pacu RN, surgeon is aware and we can keep reinforcing drainage with gauze.  Poc updated, pt verbalized Dyan 36.3

## 2021-07-14 RX ORDER — ACETAMINOPHEN 500 MG
1000 TABLET ORAL ONCE
Status: CANCELLED | OUTPATIENT
Start: 2021-07-14 | End: 2021-07-14

## 2021-07-16 ENCOUNTER — EKG ENCOUNTER (OUTPATIENT)
Dept: LAB | Age: 66
End: 2021-07-16
Attending: SURGERY
Payer: COMMERCIAL

## 2021-07-16 ENCOUNTER — LABORATORY ENCOUNTER (OUTPATIENT)
Dept: LAB | Age: 66
End: 2021-07-16
Attending: SURGERY
Payer: COMMERCIAL

## 2021-07-16 DIAGNOSIS — Z90.49 HISTORY OF COLON RESECTION: ICD-10-CM

## 2021-07-16 LAB
ANION GAP SERPL CALC-SCNC: 4 MMOL/L (ref 0–18)
ATRIAL RATE: 58 BPM
BUN BLD-MCNC: 17 MG/DL (ref 7–18)
BUN/CREAT SERPL: 20.2 (ref 10–20)
CALCIUM BLD-MCNC: 9.5 MG/DL (ref 8.5–10.1)
CHLORIDE SERPL-SCNC: 103 MMOL/L (ref 98–112)
CO2 SERPL-SCNC: 29 MMOL/L (ref 21–32)
CREAT BLD-MCNC: 0.84 MG/DL
DEPRECATED RDW RBC AUTO: 42.1 FL (ref 35.1–46.3)
ERYTHROCYTE [DISTWIDTH] IN BLOOD BY AUTOMATED COUNT: 13.5 % (ref 11–15)
GLUCOSE BLD-MCNC: 94 MG/DL (ref 70–99)
HCT VFR BLD AUTO: 37.8 %
HGB BLD-MCNC: 11.7 G/DL
MCH RBC QN AUTO: 26.4 PG (ref 26–34)
MCHC RBC AUTO-ENTMCNC: 31 G/DL (ref 31–37)
MCV RBC AUTO: 85.3 FL
OSMOLALITY SERPL CALC.SUM OF ELEC: 283 MOSM/KG (ref 275–295)
P AXIS: 66 DEGREES
P-R INTERVAL: 180 MS
PLATELET # BLD AUTO: 262 10(3)UL (ref 150–450)
POTASSIUM SERPL-SCNC: 4.4 MMOL/L (ref 3.5–5.1)
Q-T INTERVAL: 434 MS
QRS DURATION: 94 MS
QTC CALCULATION (BEZET): 426 MS
R AXIS: 66 DEGREES
RBC # BLD AUTO: 4.43 X10(6)UL
SODIUM SERPL-SCNC: 136 MMOL/L (ref 136–145)
T AXIS: 68 DEGREES
VENTRICULAR RATE: 58 BPM
WBC # BLD AUTO: 8.2 X10(3) UL (ref 4–11)

## 2021-07-16 PROCEDURE — 85027 COMPLETE CBC AUTOMATED: CPT

## 2021-07-16 PROCEDURE — 80048 BASIC METABOLIC PNL TOTAL CA: CPT

## 2021-07-16 PROCEDURE — 93010 ELECTROCARDIOGRAM REPORT: CPT | Performed by: INTERNAL MEDICINE

## 2021-07-16 PROCEDURE — 93005 ELECTROCARDIOGRAM TRACING: CPT

## 2021-07-16 PROCEDURE — 36415 COLL VENOUS BLD VENIPUNCTURE: CPT

## 2021-07-23 ENCOUNTER — LAB ENCOUNTER (OUTPATIENT)
Dept: LAB | Age: 66
End: 2021-07-23
Attending: SURGERY
Payer: COMMERCIAL

## 2021-07-23 DIAGNOSIS — Z90.49 HISTORY OF COLON RESECTION: ICD-10-CM

## 2021-07-24 LAB — SARS-COV-2 RNA RESP QL NAA+PROBE: NOT DETECTED

## 2021-07-26 ENCOUNTER — HOSPITAL ENCOUNTER (INPATIENT)
Facility: HOSPITAL | Age: 66
LOS: 4 days | Discharge: HOME | DRG: 331 | End: 2021-07-30
Attending: SURGERY | Admitting: SURGERY
Payer: COMMERCIAL

## 2021-07-26 ENCOUNTER — ANESTHESIA EVENT (OUTPATIENT)
Dept: SURGERY | Facility: HOSPITAL | Age: 66
DRG: 331 | End: 2021-07-26
Payer: COMMERCIAL

## 2021-07-26 ENCOUNTER — ANESTHESIA (OUTPATIENT)
Dept: SURGERY | Facility: HOSPITAL | Age: 66
DRG: 331 | End: 2021-07-26
Payer: COMMERCIAL

## 2021-07-26 DIAGNOSIS — Z93.3 COLOSTOMY PRESENT (HCC): ICD-10-CM

## 2021-07-26 DIAGNOSIS — Z90.49 H/O PARTIAL RESECTION OF COLON: Primary | ICD-10-CM

## 2021-07-26 DIAGNOSIS — Z90.49 HISTORY OF COLON RESECTION: ICD-10-CM

## 2021-07-26 PROCEDURE — 88307 TISSUE EXAM BY PATHOLOGIST: CPT | Performed by: SURGERY

## 2021-07-26 PROCEDURE — 88304 TISSUE EXAM BY PATHOLOGIST: CPT | Performed by: SURGERY

## 2021-07-26 PROCEDURE — 0WQF0ZZ REPAIR ABDOMINAL WALL, OPEN APPROACH: ICD-10-PCS | Performed by: SURGERY

## 2021-07-26 PROCEDURE — 5A09357 ASSISTANCE WITH RESPIRATORY VENTILATION, LESS THAN 24 CONSECUTIVE HOURS, CONTINUOUS POSITIVE AIRWAY PRESSURE: ICD-10-PCS | Performed by: HOSPITALIST

## 2021-07-26 PROCEDURE — 3E0T3BZ INTRODUCTION OF ANESTHETIC AGENT INTO PERIPHERAL NERVES AND PLEXI, PERCUTANEOUS APPROACH: ICD-10-PCS | Performed by: ANESTHESIOLOGY

## 2021-07-26 PROCEDURE — 0DBP0ZZ EXCISION OF RECTUM, OPEN APPROACH: ICD-10-PCS | Performed by: SURGERY

## 2021-07-26 PROCEDURE — S0030 INJECTION, METRONIDAZOLE: HCPCS

## 2021-07-26 PROCEDURE — 0DBM0ZZ EXCISION OF DESCENDING COLON, OPEN APPROACH: ICD-10-PCS | Performed by: SURGERY

## 2021-07-26 PROCEDURE — 94660 CPAP INITIATION&MGMT: CPT

## 2021-07-26 PROCEDURE — 76942 ECHO GUIDE FOR BIOPSY: CPT | Performed by: ANESTHESIOLOGY

## 2021-07-26 RX ORDER — PHENYLEPHRINE HCL 10 MG/ML
VIAL (ML) INJECTION AS NEEDED
Status: DISCONTINUED | OUTPATIENT
Start: 2021-07-26 | End: 2021-07-26 | Stop reason: SURG

## 2021-07-26 RX ORDER — MONTELUKAST SODIUM 10 MG/1
10 TABLET ORAL NIGHTLY
COMMUNITY
End: 2021-09-14

## 2021-07-26 RX ORDER — ATORVASTATIN CALCIUM 20 MG/1
20 TABLET, FILM COATED ORAL NIGHTLY
Refills: 3 | Status: DISCONTINUED | OUTPATIENT
Start: 2021-07-26 | End: 2021-07-30

## 2021-07-26 RX ORDER — HYDROMORPHONE HYDROCHLORIDE 1 MG/ML
INJECTION, SOLUTION INTRAMUSCULAR; INTRAVENOUS; SUBCUTANEOUS
Status: COMPLETED
Start: 2021-07-26 | End: 2021-07-26

## 2021-07-26 RX ORDER — METRONIDAZOLE 500 MG/100ML
500 INJECTION, SOLUTION INTRAVENOUS ONCE
Status: COMPLETED | OUTPATIENT
Start: 2021-07-26 | End: 2021-07-26

## 2021-07-26 RX ORDER — CEFTRIAXONE 2 G/1
INJECTION, POWDER, FOR SOLUTION INTRAMUSCULAR; INTRAVENOUS
Status: DISPENSED
Start: 2021-07-26 | End: 2021-07-26

## 2021-07-26 RX ORDER — PANTOPRAZOLE SODIUM 40 MG/1
40 TABLET, DELAYED RELEASE ORAL
COMMUNITY
End: 2021-09-13

## 2021-07-26 RX ORDER — NALOXONE HYDROCHLORIDE 0.4 MG/ML
80 INJECTION, SOLUTION INTRAMUSCULAR; INTRAVENOUS; SUBCUTANEOUS AS NEEDED
Status: DISCONTINUED | OUTPATIENT
Start: 2021-07-26 | End: 2021-07-26 | Stop reason: HOSPADM

## 2021-07-26 RX ORDER — HEPARIN SODIUM 5000 [USP'U]/ML
INJECTION, SOLUTION INTRAVENOUS; SUBCUTANEOUS
Status: COMPLETED
Start: 2021-07-26 | End: 2021-07-26

## 2021-07-26 RX ORDER — METOCLOPRAMIDE HYDROCHLORIDE 5 MG/ML
10 INJECTION INTRAMUSCULAR; INTRAVENOUS EVERY 6 HOURS PRN
Status: DISCONTINUED | OUTPATIENT
Start: 2021-07-26 | End: 2021-07-30

## 2021-07-26 RX ORDER — ALBUTEROL SULFATE 90 UG/1
2 AEROSOL, METERED RESPIRATORY (INHALATION) EVERY 6 HOURS PRN
Status: DISCONTINUED | OUTPATIENT
Start: 2021-07-26 | End: 2021-07-30

## 2021-07-26 RX ORDER — HYDROMORPHONE HYDROCHLORIDE 1 MG/ML
0.4 INJECTION, SOLUTION INTRAMUSCULAR; INTRAVENOUS; SUBCUTANEOUS EVERY 2 HOUR PRN
Status: DISCONTINUED | OUTPATIENT
Start: 2021-07-26 | End: 2021-07-30

## 2021-07-26 RX ORDER — GLYCOPYRROLATE 0.2 MG/ML
INJECTION, SOLUTION INTRAMUSCULAR; INTRAVENOUS AS NEEDED
Status: DISCONTINUED | OUTPATIENT
Start: 2021-07-26 | End: 2021-07-26 | Stop reason: SURG

## 2021-07-26 RX ORDER — METRONIDAZOLE 500 MG/100ML
INJECTION, SOLUTION INTRAVENOUS
Status: DISPENSED
Start: 2021-07-26 | End: 2021-07-26

## 2021-07-26 RX ORDER — MONTELUKAST SODIUM 10 MG/1
10 TABLET ORAL NIGHTLY
Status: DISCONTINUED | OUTPATIENT
Start: 2021-07-26 | End: 2021-07-30

## 2021-07-26 RX ORDER — ACETAMINOPHEN 500 MG
1000 TABLET ORAL ONCE AS NEEDED
Status: DISCONTINUED | OUTPATIENT
Start: 2021-07-26 | End: 2021-07-26 | Stop reason: HOSPADM

## 2021-07-26 RX ORDER — HYDROCODONE BITARTRATE AND ACETAMINOPHEN 5; 325 MG/1; MG/1
1 TABLET ORAL AS NEEDED
Status: DISCONTINUED | OUTPATIENT
Start: 2021-07-26 | End: 2021-07-26 | Stop reason: HOSPADM

## 2021-07-26 RX ORDER — HYDROCODONE BITARTRATE AND ACETAMINOPHEN 5; 325 MG/1; MG/1
2 TABLET ORAL AS NEEDED
Status: DISCONTINUED | OUTPATIENT
Start: 2021-07-26 | End: 2021-07-26 | Stop reason: HOSPADM

## 2021-07-26 RX ORDER — HYDROMORPHONE HYDROCHLORIDE 1 MG/ML
0.4 INJECTION, SOLUTION INTRAMUSCULAR; INTRAVENOUS; SUBCUTANEOUS EVERY 5 MIN PRN
Status: DISCONTINUED | OUTPATIENT
Start: 2021-07-26 | End: 2021-07-26 | Stop reason: HOSPADM

## 2021-07-26 RX ORDER — OXYCODONE HYDROCHLORIDE 10 MG/1
10 TABLET ORAL EVERY 4 HOURS PRN
Status: DISCONTINUED | OUTPATIENT
Start: 2021-07-26 | End: 2021-07-30

## 2021-07-26 RX ORDER — LEVOTHYROXINE SODIUM 88 UG/1
88 TABLET ORAL
COMMUNITY
End: 2021-09-14

## 2021-07-26 RX ORDER — FAMOTIDINE 20 MG/1
20 TABLET ORAL 2 TIMES DAILY
Status: DISCONTINUED | OUTPATIENT
Start: 2021-07-26 | End: 2021-07-30

## 2021-07-26 RX ORDER — BISACODYL 10 MG
10 SUPPOSITORY, RECTAL RECTAL
Status: DISCONTINUED | OUTPATIENT
Start: 2021-07-26 | End: 2021-07-30

## 2021-07-26 RX ORDER — HYDROMORPHONE HYDROCHLORIDE 1 MG/ML
0.8 INJECTION, SOLUTION INTRAMUSCULAR; INTRAVENOUS; SUBCUTANEOUS EVERY 2 HOUR PRN
Status: DISCONTINUED | OUTPATIENT
Start: 2021-07-26 | End: 2021-07-30

## 2021-07-26 RX ORDER — IBUPROFEN 600 MG/1
600 TABLET ORAL EVERY 6 HOURS SCHEDULED
Status: DISCONTINUED | OUTPATIENT
Start: 2021-07-27 | End: 2021-07-30

## 2021-07-26 RX ORDER — HEPARIN SODIUM 5000 [USP'U]/ML
5000 INJECTION, SOLUTION INTRAVENOUS; SUBCUTANEOUS EVERY 12 HOURS SCHEDULED
Status: DISCONTINUED | OUTPATIENT
Start: 2021-07-27 | End: 2021-07-30

## 2021-07-26 RX ORDER — ONDANSETRON 2 MG/ML
INJECTION INTRAMUSCULAR; INTRAVENOUS AS NEEDED
Status: DISCONTINUED | OUTPATIENT
Start: 2021-07-26 | End: 2021-07-26 | Stop reason: SURG

## 2021-07-26 RX ORDER — FAMOTIDINE 10 MG/ML
20 INJECTION, SOLUTION INTRAVENOUS 2 TIMES DAILY
Status: DISCONTINUED | OUTPATIENT
Start: 2021-07-26 | End: 2021-07-30

## 2021-07-26 RX ORDER — NEOSTIGMINE METHYLSULFATE 1 MG/ML
INJECTION INTRAVENOUS AS NEEDED
Status: DISCONTINUED | OUTPATIENT
Start: 2021-07-26 | End: 2021-07-26 | Stop reason: SURG

## 2021-07-26 RX ORDER — SODIUM CHLORIDE 9 MG/ML
INJECTION, SOLUTION INTRAVENOUS CONTINUOUS
Status: DISCONTINUED | OUTPATIENT
Start: 2021-07-26 | End: 2021-07-26

## 2021-07-26 RX ORDER — HEPARIN SODIUM 5000 [USP'U]/ML
5000 INJECTION, SOLUTION INTRAVENOUS; SUBCUTANEOUS ONCE
Status: COMPLETED | OUTPATIENT
Start: 2021-07-26 | End: 2021-07-26

## 2021-07-26 RX ORDER — LEVOTHYROXINE SODIUM 88 UG/1
88 TABLET ORAL
Status: DISCONTINUED | OUTPATIENT
Start: 2021-07-27 | End: 2021-07-30

## 2021-07-26 RX ORDER — SODIUM PHOSPHATE, DIBASIC AND SODIUM PHOSPHATE, MONOBASIC 7; 19 G/133ML; G/133ML
1 ENEMA RECTAL ONCE AS NEEDED
Status: DISCONTINUED | OUTPATIENT
Start: 2021-07-26 | End: 2021-07-30

## 2021-07-26 RX ORDER — OXYCODONE HYDROCHLORIDE 5 MG/1
5 TABLET ORAL EVERY 4 HOURS PRN
Status: DISCONTINUED | OUTPATIENT
Start: 2021-07-26 | End: 2021-07-30

## 2021-07-26 RX ORDER — KETOROLAC TROMETHAMINE 15 MG/ML
15 INJECTION, SOLUTION INTRAMUSCULAR; INTRAVENOUS EVERY 6 HOURS
Status: COMPLETED | OUTPATIENT
Start: 2021-07-26 | End: 2021-07-27

## 2021-07-26 RX ORDER — DOCUSATE SODIUM 100 MG/1
100 CAPSULE, LIQUID FILLED ORAL 2 TIMES DAILY
Status: DISCONTINUED | OUTPATIENT
Start: 2021-07-26 | End: 2021-07-30

## 2021-07-26 RX ORDER — ONDANSETRON 2 MG/ML
4 INJECTION INTRAMUSCULAR; INTRAVENOUS AS NEEDED
Status: DISCONTINUED | OUTPATIENT
Start: 2021-07-26 | End: 2021-07-26 | Stop reason: HOSPADM

## 2021-07-26 RX ORDER — METOCLOPRAMIDE HYDROCHLORIDE 5 MG/ML
10 INJECTION INTRAMUSCULAR; INTRAVENOUS AS NEEDED
Status: DISCONTINUED | OUTPATIENT
Start: 2021-07-26 | End: 2021-07-26 | Stop reason: HOSPADM

## 2021-07-26 RX ORDER — POLYETHYLENE GLYCOL 3350 17 G/17G
17 POWDER, FOR SOLUTION ORAL DAILY PRN
Status: DISCONTINUED | OUTPATIENT
Start: 2021-07-26 | End: 2021-07-30

## 2021-07-26 RX ORDER — ROCURONIUM BROMIDE 10 MG/ML
INJECTION, SOLUTION INTRAVENOUS AS NEEDED
Status: DISCONTINUED | OUTPATIENT
Start: 2021-07-26 | End: 2021-07-26 | Stop reason: SURG

## 2021-07-26 RX ORDER — 0.9 % SODIUM CHLORIDE 0.9 %
INTRAVENOUS SOLUTION INTRAVENOUS
Status: COMPLETED
Start: 2021-07-26 | End: 2021-07-26

## 2021-07-26 RX ORDER — ONDANSETRON 2 MG/ML
INJECTION INTRAMUSCULAR; INTRAVENOUS
Status: COMPLETED
Start: 2021-07-26 | End: 2021-07-26

## 2021-07-26 RX ORDER — ACETAMINOPHEN 325 MG/1
650 TABLET ORAL
Status: DISCONTINUED | OUTPATIENT
Start: 2021-07-26 | End: 2021-07-30

## 2021-07-26 RX ORDER — ONDANSETRON 2 MG/ML
4 INJECTION INTRAMUSCULAR; INTRAVENOUS EVERY 6 HOURS PRN
Status: DISCONTINUED | OUTPATIENT
Start: 2021-07-26 | End: 2021-07-30

## 2021-07-26 RX ORDER — DEXTROSE, SODIUM CHLORIDE, SODIUM LACTATE, POTASSIUM CHLORIDE, AND CALCIUM CHLORIDE 5; .6; .31; .03; .02 G/100ML; G/100ML; G/100ML; G/100ML; G/100ML
INJECTION, SOLUTION INTRAVENOUS CONTINUOUS
Status: DISCONTINUED | OUTPATIENT
Start: 2021-07-26 | End: 2021-07-30

## 2021-07-26 RX ORDER — LIDOCAINE HYDROCHLORIDE 10 MG/ML
INJECTION, SOLUTION EPIDURAL; INFILTRATION; INTRACAUDAL; PERINEURAL AS NEEDED
Status: DISCONTINUED | OUTPATIENT
Start: 2021-07-26 | End: 2021-07-26 | Stop reason: SURG

## 2021-07-26 RX ORDER — DEXAMETHASONE SODIUM PHOSPHATE 4 MG/ML
VIAL (ML) INJECTION AS NEEDED
Status: DISCONTINUED | OUTPATIENT
Start: 2021-07-26 | End: 2021-07-26 | Stop reason: SURG

## 2021-07-26 RX ORDER — SERTRALINE HYDROCHLORIDE 100 MG/1
100 TABLET, FILM COATED ORAL DAILY
Status: DISCONTINUED | OUTPATIENT
Start: 2021-07-27 | End: 2021-07-30

## 2021-07-26 RX ORDER — SODIUM CHLORIDE, SODIUM LACTATE, POTASSIUM CHLORIDE, CALCIUM CHLORIDE 600; 310; 30; 20 MG/100ML; MG/100ML; MG/100ML; MG/100ML
INJECTION, SOLUTION INTRAVENOUS CONTINUOUS
Status: DISCONTINUED | OUTPATIENT
Start: 2021-07-26 | End: 2021-07-26 | Stop reason: HOSPADM

## 2021-07-26 RX ADMIN — SODIUM CHLORIDE: 9 INJECTION, SOLUTION INTRAVENOUS at 11:40:00

## 2021-07-26 RX ADMIN — LIDOCAINE HYDROCHLORIDE 25 MG: 10 INJECTION, SOLUTION EPIDURAL; INFILTRATION; INTRACAUDAL; PERINEURAL at 11:01:00

## 2021-07-26 RX ADMIN — PHENYLEPHRINE HCL 100 MCG: 10 MG/ML VIAL (ML) INJECTION at 11:48:00

## 2021-07-26 RX ADMIN — SODIUM CHLORIDE: 9 INJECTION, SOLUTION INTRAVENOUS at 13:43:00

## 2021-07-26 RX ADMIN — PHENYLEPHRINE HCL 100 MCG: 10 MG/ML VIAL (ML) INJECTION at 12:59:00

## 2021-07-26 RX ADMIN — PHENYLEPHRINE HCL 100 MCG: 10 MG/ML VIAL (ML) INJECTION at 12:39:00

## 2021-07-26 RX ADMIN — SODIUM CHLORIDE: 9 INJECTION, SOLUTION INTRAVENOUS at 12:13:00

## 2021-07-26 RX ADMIN — GLYCOPYRROLATE 0.4 MG: 0.2 INJECTION, SOLUTION INTRAMUSCULAR; INTRAVENOUS at 13:28:00

## 2021-07-26 RX ADMIN — METRONIDAZOLE 500 MG: 500 INJECTION, SOLUTION INTRAVENOUS at 11:01:00

## 2021-07-26 RX ADMIN — PHENYLEPHRINE HCL 300 MCG: 10 MG/ML VIAL (ML) INJECTION at 13:13:00

## 2021-07-26 RX ADMIN — ONDANSETRON 4 MG: 2 INJECTION INTRAMUSCULAR; INTRAVENOUS at 12:37:00

## 2021-07-26 RX ADMIN — NEOSTIGMINE METHYLSULFATE 2 MG: 1 INJECTION INTRAVENOUS at 13:28:00

## 2021-07-26 RX ADMIN — ROCURONIUM BROMIDE 10 MG: 10 INJECTION, SOLUTION INTRAVENOUS at 12:47:00

## 2021-07-26 RX ADMIN — DEXAMETHASONE SODIUM PHOSPHATE 4 MG: 4 MG/ML VIAL (ML) INJECTION at 11:45:00

## 2021-07-26 RX ADMIN — ROCURONIUM BROMIDE 30 MG: 10 INJECTION, SOLUTION INTRAVENOUS at 11:01:00

## 2021-07-26 RX ADMIN — SODIUM CHLORIDE: 9 INJECTION, SOLUTION INTRAVENOUS at 12:43:00

## 2021-07-26 RX ADMIN — PHENYLEPHRINE HCL 100 MCG: 10 MG/ML VIAL (ML) INJECTION at 11:25:00

## 2021-07-26 RX ADMIN — SODIUM CHLORIDE: 9 INJECTION, SOLUTION INTRAVENOUS at 12:24:00

## 2021-07-26 NOTE — PROGRESS NOTES
NURSING ADMISSION NOTE      Patient admitted via bed  Oriented to room. Safety precautions initiated. Bed in low position. Call light in reach.     Patient arrived from PACU in stable condition

## 2021-07-26 NOTE — ANESTHESIA PROCEDURE NOTES
Airway  Date/Time: 7/26/2021 11:06 AM  Urgency: elective      General Information and Staff    Patient location during procedure: OR  Anesthesiologist: Natali Espinoza MD  Performed: anesthesiologist     Indications and Patient Condition  Indications for
Regional Block  Performed by: Mary Garcia MD  Authorized by: Mary Garcia MD       General Information and Staff    Start Time:  7/26/2021 1:20 PM  End Time:  7/26/2021 1:27 PM  Anesthesiologist:  Mary Garcia MD  Performed by:   Anesthesiol
25-Jul-2017

## 2021-07-26 NOTE — PLAN OF CARE
Patient is drowsy coming back up from PACU. Hx of GERARDO. On 3L of oxygen via NC. Abdomen is soft/ non-distended. Midline incision covered with aquacel dressing- c/d/I. Mcgraw catheter draining cloudy/ yellow urine. Hx of ESBL in urine.  Contact isolation maint

## 2021-07-26 NOTE — H&P
[facility] Hospitalist H&P       CC: No chief complaint on file.        PCP: Jalil Davila MD    History of Present Illness: 59 y/o w hx of afib, depression, fibromyalgia w hx of fecal impaction and large bowel obstruction s/p colostomy 3/22/21 now her ENDOSCOPY   • HYSTERECTOMY  08/26/2019   • OTHER      Left wrist/ nerve release- 2018   • OTHER SURGICAL HISTORY      R PULMONARY LOBE-removed partial lobe AS INFANT DUE TO PNEUMONIA   • OTHER SURGICAL HISTORY      DENTAL   • OTHER SURGICAL HISTORY      CE Oral Chew Tab, Chew 1 tablet (80 mg total) by mouth 3 (three) times daily as needed for FLATULENCE., Disp: 15 tablet, Rfl: 0  mometasone 0.1 % External Ointment, Apply 1 Application topically 2 (two) times daily as needed (for 3-5 days prn rash). , Disp: 45 • Cancer Sister         gynecologic   • Other (Other) Sister         MS   • Diabetes Mother    • Diabetes Maternal Grandmother    • Diabetes Maternal Grandfather    • Cancer Other         NONE       Review of Systems  Comprehensive ROS reviewed and negat hx of afib, depression, fibromyalgia w hx of fecal impaction and large bowel obstruction s/p colostomy 3/22/21 now here for reversal    hx of fecal impaction and large bowel obstruction s/p colostomy 3/22/21 now here for reversal  -mgmt per gen surg  -olegario

## 2021-07-26 NOTE — H&P
5/30/2021    No chief complaint on file.       HPI:    Varinder Chatterjee is a 60year old female who presents for evaluation for colostoimy reversal  She is s/p colon resection and colostomy for sigmoid obstruction  3-  She has been doing well  She wo ENDOSCOPY   • HYSTERECTOMY  08/26/2019   • OTHER      Left wrist/ nerve release- 2018   • OTHER SURGICAL HISTORY      R PULMONARY LOBE-removed partial lobe AS INFANT DUE TO PNEUMONIA   • OTHER SURGICAL HISTORY      DENTAL   • OTHER SURGICAL HISTORY      CE supple, no JVD  RESPIRATORY: clear to auscultation  CARDIOVASCULAR: RRR  ABDOMEN: normal active BS, soft and no tenderness, no mass  Colostomy left mid abdomin  Midline scar well healed  LYMPHATIC: no lymphadenopathy  EXTREMITIES: no cyanosis or edema

## 2021-07-26 NOTE — ANESTHESIA POSTPROCEDURE EVALUATION
[facility] Patient Status:  Inpatient   Age/Gender 60year old female MRN [id number]   Location [facility] Attending Arabella Miranda MD   Hosp Day # 0 PCP Margot Manzano MD       Anesthesia Po

## 2021-07-26 NOTE — ANESTHESIA PREPROCEDURE EVALUATION
PRE-OP EVALUATION    Patient Name: Lori Parsons    Admit Diagnosis: History of colon resection [Z90.49]  Colostomy present ([facility] [address] [Z93.3]    Pre-op Diagnosis: History of colon resection [Z90.49]  Colostomy present ([facility] [address] [Z93.3]    EXPLORATORY LAPAROTOMY HISTORY      CERVICAL HNP SURGERY   • OTHER SURGICAL HISTORY      R SHOULDER RCR   • OTHER SURGICAL HISTORY      R SHOULDER revision RCR   • OTHER SURGICAL HISTORY      B LARGE TOENAIL REMOVAL DUE TO SEVERE ONYCHOMYCOSIS   • OTHER SURGICAL HISTORY  7/2011

## 2021-07-26 NOTE — BRIEF OP NOTE
Pre-Operative Diagnosis: History of colonic obstruction  S/p sigmoid colon resection and colostomy       Post-Operative Diagnosis: s/p sigmoid colon resection  Parastomal hernia     Procedure Performed:   Exploratory laparotomy  Low anterior colon anastomo

## 2021-07-27 LAB
ALBUMIN SERPL-MCNC: 3 G/DL (ref 3.4–5)
ALBUMIN/GLOB SERPL: 0.8 {RATIO} (ref 1–2)
ALP LIVER SERPL-CCNC: 71 U/L
ALT SERPL-CCNC: 23 U/L
ANION GAP SERPL CALC-SCNC: 3 MMOL/L (ref 0–18)
AST SERPL-CCNC: 16 U/L (ref 15–37)
BILIRUB SERPL-MCNC: 0.4 MG/DL (ref 0.1–2)
BUN BLD-MCNC: 16 MG/DL (ref 7–18)
BUN/CREAT SERPL: 19.3 (ref 10–20)
CALCIUM BLD-MCNC: 8.2 MG/DL (ref 8.5–10.1)
CHLORIDE SERPL-SCNC: 106 MMOL/L (ref 98–112)
CO2 SERPL-SCNC: 28 MMOL/L (ref 21–32)
CREAT BLD-MCNC: 0.83 MG/DL
DEPRECATED RDW RBC AUTO: 42.9 FL (ref 35.1–46.3)
ERYTHROCYTE [DISTWIDTH] IN BLOOD BY AUTOMATED COUNT: 14.1 % (ref 11–15)
GLOBULIN PLAS-MCNC: 3.6 G/DL (ref 2.8–4.4)
GLUCOSE BLD-MCNC: 117 MG/DL (ref 70–99)
HCT VFR BLD AUTO: 31.7 %
HGB BLD-MCNC: 9.9 G/DL
M PROTEIN MFR SERPL ELPH: 6.6 G/DL (ref 6.4–8.2)
MCH RBC QN AUTO: 26.1 PG (ref 26–34)
MCHC RBC AUTO-ENTMCNC: 31.2 G/DL (ref 31–37)
MCV RBC AUTO: 83.6 FL
OSMOLALITY SERPL CALC.SUM OF ELEC: 286 MOSM/KG (ref 275–295)
PLATELET # BLD AUTO: 201 10(3)UL (ref 150–450)
POTASSIUM SERPL-SCNC: 4.4 MMOL/L (ref 3.5–5.1)
RBC # BLD AUTO: 3.79 X10(6)UL
SODIUM SERPL-SCNC: 137 MMOL/L (ref 136–145)
WBC # BLD AUTO: 13.6 X10(3) UL (ref 4–11)

## 2021-07-27 PROCEDURE — 97530 THERAPEUTIC ACTIVITIES: CPT

## 2021-07-27 PROCEDURE — 85027 COMPLETE CBC AUTOMATED: CPT | Performed by: PHYSICIAN ASSISTANT

## 2021-07-27 PROCEDURE — 80053 COMPREHEN METABOLIC PANEL: CPT | Performed by: PHYSICIAN ASSISTANT

## 2021-07-27 PROCEDURE — 97165 OT EVAL LOW COMPLEX 30 MIN: CPT

## 2021-07-27 PROCEDURE — 97535 SELF CARE MNGMENT TRAINING: CPT

## 2021-07-27 PROCEDURE — 97161 PT EVAL LOW COMPLEX 20 MIN: CPT

## 2021-07-27 NOTE — PHYSICAL THERAPY NOTE
PHYSICAL THERAPY EVALUATION - INPATIENT     Room Number: 323/323-A  Evaluation Date: 7/27/2021  Type of Evaluation: Initial  Physician Order: PT Eval and Treat    Presenting Problem: Expl Lap, Colostomy revision on 7/26/21  Reason for Therapy: Mobility feels like something stuck in throat at all times/ now resolved- 07/2019   • Sleep apnea     cpap   • Visual impairment     glasses prn   • Vitamin D deficiency 1/13/2013       Past Surgical History  Past Surgical History:   Procedure Laterality Date goal is not walk much today yet, but agreeable to do so tomorrow. OBJECTIVE  Precautions:  Other (Comment) (AB incision)  Fall Risk: Standard fall risk    WEIGHT BEARING RESTRICTION  Weight Bearing Restriction: None                PAIN ASSESSMENT  Ratin tested  Comment : n/a      Skilled Therapy Provided: Rec'd pt in supine. During eval and treat, pt on room air.      Bed Mobility:  Rolling right = NT   Rolling left = NT    Supine to sit = mod indep   Sit to supine = NT     Transfer Mobility:  Sit to st from skilled inpatient PT to address the above deficits to assist patient in returning to prior level of function. DISCHARGE RECOMMENDATIONS  PT Discharge Recommendations: Home      PLAN  PT Treatment Plan: Bed mobility; Patient education;Gait training;S

## 2021-07-27 NOTE — PROGRESS NOTES
[facility] Hospitalist Progress Note     CC: Hospital Follow up    PCP: Sharon Dawkins MD       Assessment/Plan:     Active Problems:    H/O partial resection of colon    61 y/o w hx of afib, depression, fibromyalgia w hx of fecal impaction and large bow dry  EXT: no edema      Data Review:       Labs:     Recent Labs   Lab 07/27/21  0603   RBC 3.79*   HGB 9.9*   HCT 31.7*   MCV 83.6   MCH 26.1   MCHC 31.2   RDW 14.1   WBC 13.6*   .0         Recent Labs   Lab 07/27/21  0603   *   BUN 16   CRE

## 2021-07-27 NOTE — PROGRESS NOTES
[facility]  Progress Note    Ashtyn Benítez Patient Status:  Inpatient    1955 MRN [id number]   [facility] 3NW-A Attending Lizbeth Gillespie MD   Hosp Day # 1 PCP Cristobal Chauhan MD     Subjective:    Patient sitting up in left wrist  Global 10/16/2018     Orthopedic aftercare     Stiffness of left wrist joint     Uterovaginal prolapse     Stress incontinence     Urge incontinence     Troponin I above reference range     Blood type A+     Hyponatremia     Anemia     Metaboli

## 2021-07-27 NOTE — PROGRESS NOTES
Pt is Ax4, vss, afebrile, Contact isolation for ESBL (urine), /Tele-NSR, PT/OT, IVF infusing, up w/ asst. Midline w/ staples covered w/ aquacel, scant amt of shadowing noted on dressing, transverse incision covered w/ aquacel, c/d/i.  Pt states pain is m

## 2021-07-27 NOTE — OPERATIVE REPORT
[facility]    PATIENT'S NAME: Kyler Elida   ATTENDING PHYSICIAN: Van Brooks M.D. OPERATING PHYSICIAN: Van Brooks M.D.    PATIENT ACCOUNT#:   [de-identified]    LOCATION:  [address] [place] [address]  MEDICAL RECORD #:   [id number]       DATE OF BIRTH the level of the posterior fascia with 75 ARIN stapler. I mobilized the left colon from its lateral peritoneal attachments to the splenic flexure. There was a stool ball in the descending colon about the size of a plum.   I was able to milk this to the sta

## 2021-07-27 NOTE — PROGRESS NOTES
Pt resting in bed. IVF infusing per mar. Pt tolerating clear liquid diet. Pt's urine output has increased; tidwell catheter to remain in place until tomorrow per surgery. Up with SB. Pt has sat up in the chair and ambulated in room.  KARI and dee dee cooper

## 2021-07-27 NOTE — OCCUPATIONAL THERAPY NOTE
OCCUPATIONAL THERAPY EVALUATION - INPATIENT     Room Number: 323/323-A  Evaluation Date: 7/27/2021  Type of Evaluation: Initial  Presenting Problem: colostomy reversal    Physician Order: IP Consult to Occupational Therapy  Reason for Therapy: ADL/IADL Dys Date   • ANGIOGRAM  7/31/11    OM spasm   • APPENDECTOMY     • COLONOSCOPY     • COLONOSCOPY  4/12/19= Diverticulosis    Repeat 2029   • COLONOSCOPY N/A 4/12/2019    Procedure: COLONOSCOPY;  Surgeon: Elias Ye MD;  Location: [facility] ENDOSCOPY   • [address] [place] [address] Loop self-stated goal is not stated    OBJECTIVE  Precautions:  Other (Comment) (AB incision)  Fall Risk: Standard fall risk    WEIGHT BEARING RESTRICTION  Weight Bearing Restriction: None                PAIN ASSESSMENT  Rating: Unable to rate  Location:  (incis suggested that patient sleep in a recliner if available, patient stated that she has a recliner and did this after her last admission. Patient needed min A for trunk for supine to sit due to pain. HOB was raised prior to transfer.   Patient able to stand w assistance during recovery when stable for hospital discharge    Patient Complexity  Occupational Profile/Medical History LOW - Brief history including review of medical or therapy records    Specific performance deficits impacting engagement in ADL/IADL L

## 2021-07-28 NOTE — PLAN OF CARE
Pt alert and oriented x4. Resting in bed. Pt ambulated in room with assistance. Pt advanced to full liquid diet per surgery PA. Pt received prn antiemetic for nausea; pt reported it provided relief. Receiving prn medication for pain. Tele, NSR HR 80's.  Pt factors as ordered  - Implement neutropenic guidelines  Outcome: Progressing     Problem: SAFETY ADULT - FALL  Goal: Free from fall injury  Description: INTERVENTIONS:  - Assess pt frequently for physical needs  - Identify cognitive and physical deficits a measures as appropriate  - Advance diet as tolerated, if ordered  - Obtain nutritional consult as needed  - Evaluate fluid balance  Outcome: Progressing  Goal: Maintains or returns to baseline bowel function  Description: INTERVENTIONS:  - Assess bowel fun

## 2021-07-28 NOTE — PROGRESS NOTES
[facility]  Progress Note    Chito Large Patient Status:  Inpatient    1955 MRN [id number]   [facility] 3NW-A Attending Leander Cole MD   Hosp Day # 2 PCP Sukhdeep Rivera MD     Subjective:    Patient reports passi Intractable abdominal pain     Colitis     Obstipation     H/O partial resection of colon      Impression:     59 y/o POD # 2 Exploratory laparotomy, resection of old ostomy, resection of portion of descending colon, resection of portion of rectum, low ant

## 2021-07-28 NOTE — PROGRESS NOTES
[facility] Hospitalist Progress Note     CC: Hospital Follow up    PCP: Papo Brian MD       Assessment/Plan:     Active Problems:    H/O partial resection of colon    59 y/o w hx of afib, depression, fibromyalgia w hx of fecal impaction and large bow dry  EXT: no edema      Data Review:       Labs:     Recent Labs   Lab 07/27/21  0603   RBC 3.79*   HGB 9.9*   HCT 31.7*   MCV 83.6   MCH 26.1   MCHC 31.2   RDW 14.1   WBC 13.6*   .0         Recent Labs   Lab 07/27/21  0603   *   BUN 16   CRE

## 2021-07-28 NOTE — PLAN OF CARE
Pt axox4, resting in bed, abdomen is soft tender to touch, two acqucel dressing on abdomen with scant drainage, lungs are clear, pain 5/10 gave pain meds, pt tidwell draining clear yellow urine, PIV d5 LR, clear liquid, cpap at night, tele normal sinus.   Pt INTERVENTIONS:  - Assess pt frequently for physical needs  - Identify cognitive and physical deficits and behaviors that affect risk of falls.   - [place] fall precautions as indicated by assessment.  - Educate pt/family on patient safety including physic Maintains or returns to baseline bowel function  Description: INTERVENTIONS:  - Assess bowel function  - Maintain adequate hydration with IV or PO as ordered and tolerated  - Evaluate effectiveness of GI medications  - Encourage mobilization and activity

## 2021-07-29 PROBLEM — G47.33 OSA ON CPAP: Status: ACTIVE | Noted: 2021-07-29

## 2021-07-29 PROBLEM — Z99.89 OSA ON CPAP: Status: ACTIVE | Noted: 2021-07-29

## 2021-07-29 PROCEDURE — 97530 THERAPEUTIC ACTIVITIES: CPT

## 2021-07-29 PROCEDURE — 94660 CPAP INITIATION&MGMT: CPT

## 2021-07-29 PROCEDURE — 97116 GAIT TRAINING THERAPY: CPT

## 2021-07-29 RX ORDER — HYDROCODONE BITARTRATE AND ACETAMINOPHEN 5; 325 MG/1; MG/1
1 TABLET ORAL EVERY 4 HOURS PRN
Qty: 20 TABLET | Refills: 0 | Status: SHIPPED | OUTPATIENT
Start: 2021-07-29 | End: 2021-08-08

## 2021-07-29 RX ORDER — BUTALBITAL, ACETAMINOPHEN AND CAFFEINE 50; 325; 40 MG/1; MG/1; MG/1
1 TABLET ORAL EVERY 4 HOURS PRN
Status: DISCONTINUED | OUTPATIENT
Start: 2021-07-29 | End: 2021-07-30

## 2021-07-29 NOTE — PLAN OF CARE
Problem: Patient/Family Goals  Goal: Patient/Family Long Term Goal  Description: Patient's Long Term Goal: Discharge Home    Interventions:  - Pain Tolerance  -Diet Tolerance  - See additional Care Plan goals for specific interventions  Outcome: Progress assessment  - Modify environment to reduce risk of injury  - Provide assistive devices as appropriate  - Consider OT/PT consult to assist with strengthening/mobility  - Encourage toileting schedule  Outcome: Progressing     Problem: [address] routine/schedule  - Consider collaborating with pharmacy to review patient's medication profile  Outcome: Progressing

## 2021-07-29 NOTE — OCCUPATIONAL THERAPY NOTE
OCCUPATIONAL THERAPY TREATMENT NOTE - INPATIENT     Room Number: 323/323-A  Session: 1   Number of Visits to Meet Established Goals: 3    Presenting Problem: colostomy reversal    History related to current admission: Admitted from home 7/26/21 for planned 4/12/19= Diverticulosis    Repeat 2029   • COLONOSCOPY N/A 4/12/2019    Procedure: COLONOSCOPY;  Surgeon: Chintan Pierce MD;  Location: [address] [place] [address] ENDOSCOPY   • COLONOSCOPY N/A 3/16/2021    Procedure: COLONOSCOPY WITH DECOMPRESSION;  Surgeon: Dalton Adams MD;  Jasmin Dumonter upper body clothing?: None  -   Taking care of personal grooming such as brushing teeth?: None  -   Eating meals?: None    AM-PAC Score:  Score: 24  Approx Degree of Impairment: 0%  Standardized Score (AM-PAC Scale): 57.54  CMS Modifier (G-Code): CH    FUN Home (assistance during recovery as needed)  OT Device Recommendations: Reacher;Sock aid;Long-handled shoehorn;Long-handled sponge    PLAN  OT Treatment Plan: Energy conservation/work simplification techniques;ADL training;Functional transfer training; Ariana

## 2021-07-29 NOTE — PROGRESS NOTES
[facility]  Progress Note    Antoine Dooley Patient Status:  Inpatient    1955 MRN [id number]   [facility] 3NW-A Attending Shamar Teran MD   Hosp Day # 3 PCP Sharon Dawkins MD     Subjective:    Patient reports pain CPAP      Impression:     59 y/o S/P: Exploratory laparotomy, resection of old ostomy, resection of portion of descending colon, resection of portion of rectum, low anterior anastomosis, repair of parastomal hernia  Afebrile, tolerating full liquids  Pain

## 2021-07-29 NOTE — PROGRESS NOTES
[facility] Hospitalist Progress Note     CC: Hospital Follow up    PCP: Maribel Hill MD       Assessment/Plan:     Active Problems:    H/O partial resection of colon    GERARDO on CPAP    61 y/o w hx of afib, depression, fibromyalgia w hx of fecal impactio intact  Psych: Affect- normal  SKIN: warm, dry  EXT: no edema      Data Review:       Labs:     Recent Labs   Lab 07/27/21  0603   RBC 3.79*   HGB 9.9*   HCT 31.7*   MCV 83.6   MCH 26.1   MCHC 31.2   RDW 14.1   WBC 13.6*   .0         Recent Labs   L

## 2021-07-29 NOTE — PROGRESS NOTES
0140- Per patient and Surgery MD NEIL saw patient and reported that patient will be discharged tomorrow.

## 2021-07-29 NOTE — PHYSICAL THERAPY NOTE
PHYSICAL THERAPY TREATMENT NOTE - INPATIENT    Room Number: 323/323-A     Session: 1   Number of Visits to Meet Established Goals: 2    Presenting Problem: Expl Lap, Colostomy revision on 7/26/21    Problem List  Active Problems:    H/O partial resection Carmen Morrison MD;  Location: [facility] ENDOSCOPY   • HYSTERECTOMY  08/26/2019   • OTHER      Left wrist/ nerve release- 2018   • OTHER SURGICAL HISTORY      R PULMONARY LOBE-removed partial lobe AS INFANT DUE TO PNEUMONIA   • OTHER SURGICAL HISTORY      DENTAL   • OTHER None   -   Moving from lying on back to sitting on the side of the bed?: None   How much help from another person does the patient currently need. ..   -   Moving to and from a bed to a chair (including a wheelchair)?: None   -   Need to walk in hospital ro mobility; Patient education;Gait training;Stoop training;Stair training;Balance training; Other (Comment) (pain management)  Rehab Potential : Good  Frequency (Obs): 3-5x/week    CURRENT GOALS   Goal #1 Patient is able to demonstrate supine - sit EOB @ level

## 2021-07-29 NOTE — PLAN OF CARE
Pt axox4, resting in bed, pt ambulating to toilet, voiding clear yellow urine, abdomen is soft, rounded, midline incision with staples FLOR, transverse abdomen incision with sutures FLOR both are c/d/I.  Pt on full liquid diet, lungs clear, passing gas and be neutropenic guidelines  Outcome: Progressing     Problem: SAFETY ADULT - FALL  Goal: Free from fall injury  Description: INTERVENTIONS:  - Assess pt frequently for physical needs  - Identify cognitive and physical deficits and behaviors that affect risk of diet as tolerated, if ordered  - Obtain nutritional consult as needed  - Evaluate fluid balance  Outcome: Progressing  Goal: Maintains or returns to baseline bowel function  Description: INTERVENTIONS:  - Assess bowel function  - Maintain adequate hydratio

## 2021-07-29 NOTE — PROGRESS NOTES
Pt alert and oriented x4. IV SL. Advanced to soft diet for lunch. Receiving prn medication per mar. Voiding. Up with SB assist. Ambulated in halls. ML and transverse incision with staples and sutures, C/D/I. Tele, NSR HR 80's. RA, .  Plan of care discuss

## 2021-07-30 VITALS
DIASTOLIC BLOOD PRESSURE: 60 MMHG | SYSTOLIC BLOOD PRESSURE: 108 MMHG | WEIGHT: 168.19 LBS | TEMPERATURE: 98 F | OXYGEN SATURATION: 95 % | HEART RATE: 67 BPM | RESPIRATION RATE: 18 BRPM | HEIGHT: 67 IN | BODY MASS INDEX: 26.4 KG/M2

## 2021-07-30 RX ORDER — ONDANSETRON 4 MG/1
4 TABLET, ORALLY DISINTEGRATING ORAL EVERY 4 HOURS PRN
Qty: 3 TABLET | Refills: 0 | Status: SHIPPED | OUTPATIENT
Start: 2021-07-30 | End: 2021-11-03 | Stop reason: ALTCHOICE

## 2021-07-30 NOTE — PROGRESS NOTES
NURSING DISCHARGE NOTE    Discharged Home via Wheelchair. Accompanied by Support staff. Belongings Taken by patient/family.

## 2021-07-30 NOTE — PLAN OF CARE
Patient A/O X 4, VSS. Passing gas, denies pain after pain medication. Tolerating soft diet well. Midline staples CDI. CHG wipe daily.      Problem: Patient/Family Goals  Goal: Patient/Family Long Term Goal  Description: Patient's Long Term Goal: Discharge H assessment.  - Educate pt/family on patient safety including physical limitations  - Instruct pt to call for assistance with activity based on assessment  - Modify environment to reduce risk of injury  - Provide assistive devices as appropriate  - Consider effectiveness of GI medications  - Encourage mobilization and activity  - Obtain nutritional consult as needed  - Establish a toileting routine/schedule  - Consider collaborating with pharmacy to review patient's medication profile  Outcome: Progressing

## 2021-07-30 NOTE — PROGRESS NOTES
[facility]  Progress Note    Leander Quinonez Patient Status:  Inpatient    1955 MRN [id number]   [facility] 3NW-A Attending Denette Castleman, MD   Hosp Day # 4 PCP Damaris Kay MD     Subjective:    Patient reports pain of colon     GERARDO on CPAP      Impression:     59 y/o S/P: Exploratory laparotomy, resection of old ostomy, resection of portion of descending colon, resection of portion of rectum, low anterior anastomosis, repair of parastomal hernia  Pain controlled  Kim

## 2021-07-30 NOTE — PLAN OF CARE
Patient complains of pain treated with scheduled ibuprofen/tylenol and prn oxycodone. No nausea. Tolerating regular diet. Voids to bathroom  +flatus. No bm. VSS. Safety maintained.

## 2021-07-31 NOTE — DISCHARGE SUMMARY
[address] Tera Saul Patient Status:  Inpatient    1955 MRN [id number]   [facility] 3NW-A Attending No att. providers found   Hosp Day # 4 PCP Neal Umana MD     Date of Admission:  Albuterol Sulfate  (90 Base) MCG/ACT Aers  Commonly known as: ProAir HFA      Inhale 2 puffs into the lungs every 6 (six) hours as needed for Wheezing.    Quantity: 3 Inhaler  Refills: 3     aspirin 81 MG Chew      Chew 1 tablet (81 mg total) by mo CALAN-SR      Take 1 tablet (240 mg total) by mouth once daily. Quantity: 90 tablet  Refills: 1     VITAMIN D3 OR      Take 1 capsule by mouth daily.    Refills: 0        STOP taking these medications    metRONIDAZOLE 500 MG Tabs  Commonly known as: Thea --> po norco on discharge     Afib  -rate control with metoprolol and verapamil   -not on ac     Htn  -SBP stable  -ok to resume metoprolol and verapamil   -d/w pt that she should check blood pressure in am and if low to hold off BP medications until blood

## 2021-08-02 NOTE — PAYOR COMM NOTE
--------------  DISCHARGE REVIEW    Payor: DANIELLE GARVIN  Subscriber #:  [id number]  Authorization Number: [id number]    Admit date: 7/26/21  Admit time:   8:38 AM  Discharge Date: 7/30/2021  3:18 PM     Admitting Physician: Leander Cole MD  Attending Phys details   HYDROcodone-acetaminophen 5-325 MG Tabs  Commonly known as: [address]  Notes to patient: This medication contains tylenol. Do not exceed 4000 mg tylenol per day. Take 1 tablet by mouth every 4 (four) hours as needed.    Stop taking on: August 8, daily before meals. Refills: 0     sertraline 100 MG Tabs  Commonly known as: ZOLOFT      Take 1 tablet (100 mg total) by mouth daily.    Quantity: 90 tablet  Refills: 1     simethicone 80 MG Chew  Commonly known as: MYLICON      Chew 1 tablet (80 mg tota rebound or guarding. Neurologic: No focal neurological deficits. Musculoskeletal: Moves all extremities. Extremities: No edema.   -----------------------------------------------------------------------------------------------  PATIENT DISCHARGE INSTRUCT

## 2021-08-03 NOTE — PAYOR COMM NOTE
--------------  CONTINUED STAY REVIEW    Payor: DANIELLE GARVIN  Subscriber #:  [id number]  Authorization Number: [id number]    Admit date: 7/26/21  Admit time:  8:38 AM    FAXING CLINICAL UPDATE FOR 7/29/21  Assessment/Plan:    61 y/o w hx of afib, fibromyalgi

## 2021-09-30 NOTE — RESPIRATORY THERAPY NOTE
Goal: To achieve optimal ventilation and oxygenation.     INTERVENTIONS:  • Assess for changes in respiratory status  • Assess for changes in mentation and behavior  • Position to facilitate oxygenation and minimize respiratory effort  • Oxygen supplementat Isaias Flannery MD, MD    9/30/2021

## 2021-10-05 ENCOUNTER — HOSPITAL ENCOUNTER (OUTPATIENT)
Age: 66
Discharge: HOME | End: 2021-10-05
Attending: EMERGENCY MEDICINE
Payer: MEDICARE

## 2021-10-05 ENCOUNTER — APPOINTMENT (OUTPATIENT)
Dept: GENERAL RADIOLOGY | Age: 66
End: 2021-10-05
Attending: EMERGENCY MEDICINE
Payer: MEDICARE

## 2021-10-05 VITALS
HEART RATE: 74 BPM | TEMPERATURE: 97 F | BODY MASS INDEX: 26 KG/M2 | WEIGHT: 165 LBS | RESPIRATION RATE: 18 BRPM | OXYGEN SATURATION: 97 % | DIASTOLIC BLOOD PRESSURE: 74 MMHG | SYSTOLIC BLOOD PRESSURE: 114 MMHG

## 2021-10-05 DIAGNOSIS — S92.901A CLOSED FRACTURE OF RIGHT FOOT, INITIAL ENCOUNTER: Primary | ICD-10-CM

## 2021-10-05 PROCEDURE — 99214 OFFICE O/P EST MOD 30 MIN: CPT

## 2021-10-05 PROCEDURE — 28470 CLTX METATARSAL FX WO MNP EA: CPT

## 2021-10-05 PROCEDURE — 73630 X-RAY EXAM OF FOOT: CPT | Performed by: EMERGENCY MEDICINE

## 2021-10-05 PROCEDURE — 99213 OFFICE O/P EST LOW 20 MIN: CPT

## 2021-10-05 NOTE — ED PROVIDER NOTES
Patient Seen in: [facility]      History   Patient presents with: Foot Injury    Stated Complaint: sore throat    Subjective:   HPI    Patient is a pleasant 30-year-old female, presenting for evaluation of right foot pain.     Patient sta History:   Procedure Laterality Date   • ANGIOGRAM  7/31/11    OM spasm   • APPENDECTOMY     • COLONOSCOPY     • COLONOSCOPY  4/12/19= Diverticulosis    Repeat 2029   • COLONOSCOPY N/A 4/12/2019    Procedure: COLONOSCOPY;  Surgeon: Og An MD;  Lele Holland [address]   Temp 97.2 °F (36.2 °C) (Temporal)   Resp 18   Wt 74.8 kg   LMP 09/20/2009   SpO2 97%   BMI 25.84 kg/m²         Physical Exam  Vitals and nursing note reviewed. Constitutional:       Appearance: Normal appearance.    HENT:      Head: Normocephalic and Finalized by (CST): Dahlia Dial MD on 10/05/2021 at 2:01 PM            MDM      Patient was placed on the cart and evaluated. X-ray of the right foot was obtained. Results of the x-ray were reviewed and discussed with the patient.   Diagnosis of foot fr

## 2021-10-05 NOTE — ED INITIAL ASSESSMENT (HPI)
Right foot pain - states she banged her foot against the wall while cleaning the windows 4 days ago.  Pain started 2-3 days ago

## 2021-12-10 PROBLEM — D47.2 MGUS (MONOCLONAL GAMMOPATHY OF UNKNOWN SIGNIFICANCE): Status: ACTIVE | Noted: 2021-12-10

## 2022-03-01 NOTE — PLAN OF CARE
-- DO NOT REPLY / DO NOT REPLY ALL --  -- Message is from the [facility]--    General Patient Message      Reason for Call: Patient's son wants call to confirm which blood pressure medication patient is supposed to be taking. The son called the pharmacy and  they state there was two different ones on file. Also he needs the cholestorol medication confirmed as well.     Caller Information       Type Contact Phone    03/01/2022 02:58 PM CST Phone (Incoming) ZEINAB LIANG (Emergency Contact) [phone]          Alternative phone number: none    Turnaround time given to caller:   \"This message will be sent to [state Provider's name]. The clinical team will fulfill your request as soon as they review your message.\"     Problem: Patient/Family Goals  Goal: Patient/Family Long Term Goal  Description  Patient's Long Term Goal: to discharge home    Interventions:  - IV steroids  - See additional Care Plan goals for specific interventions   Outcome: Progressing  Goal: Patie discharge plan: Home    F/U appointment scheduled within 7 days. .. [] [facility] ([facility]     [] Pulmonologist     [] Primary Care Physician     [] Other Specialty    Watched the home discharge recovery videos related to diagnosis. ..      []

## 2022-05-31 NOTE — BRIEF OP NOTE
Pre-Operative Diagnosis: Obstruction, colon ([facility]) [K56.609]     Post-Operative Diagnosis: Obstruction, colon ([address] [K56.609]      Procedure Performed:   Ex lap, sigmoid resection and colostomy    Surgeon(s) and Role:     Rebeka Cid MD - Primary    A
157.48

## 2022-09-27 ENCOUNTER — APPOINTMENT (OUTPATIENT)
Dept: GENERAL RADIOLOGY | Facility: HOSPITAL | Age: 67
End: 2022-09-27
Attending: EMERGENCY MEDICINE

## 2022-09-27 ENCOUNTER — HOSPITAL ENCOUNTER (EMERGENCY)
Facility: HOSPITAL | Age: 67
Discharge: HOME | End: 2022-09-27
Attending: EMERGENCY MEDICINE

## 2022-09-27 VITALS
HEIGHT: 67 IN | TEMPERATURE: 98 F | WEIGHT: 165 LBS | SYSTOLIC BLOOD PRESSURE: 166 MMHG | OXYGEN SATURATION: 97 % | BODY MASS INDEX: 25.9 KG/M2 | HEART RATE: 70 BPM | DIASTOLIC BLOOD PRESSURE: 82 MMHG | RESPIRATION RATE: 17 BRPM
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
## 2022-09-27 DIAGNOSIS — S62.101A RIGHT WRIST FRACTURE, CLOSED, INITIAL ENCOUNTER: Primary | ICD-10-CM

## 2022-09-27 DIAGNOSIS — S86.912A KNEE STRAIN, LEFT, INITIAL ENCOUNTER: ICD-10-CM

## 2022-09-27 LAB — SARS-COV-2 RNA RESP QL NAA+PROBE: NOT DETECTED

## 2022-09-27 PROCEDURE — 96374 THER/PROPH/DIAG INJ IV PUSH: CPT

## 2022-09-27 PROCEDURE — 73560 X-RAY EXAM OF KNEE 1 OR 2: CPT | Performed by: EMERGENCY MEDICINE

## 2022-09-27 PROCEDURE — 99285 EMERGENCY DEPT VISIT HI MDM: CPT

## 2022-09-27 PROCEDURE — 25605 CLTX DST RDL FX/EPHYS SEP W/: CPT

## 2022-09-27 PROCEDURE — 96361 HYDRATE IV INFUSION ADD-ON: CPT

## 2022-09-27 PROCEDURE — 73110 X-RAY EXAM OF WRIST: CPT | Performed by: EMERGENCY MEDICINE

## 2022-09-27 PROCEDURE — 96375 TX/PRO/DX INJ NEW DRUG ADDON: CPT

## 2022-09-27 PROCEDURE — 96376 TX/PRO/DX INJ SAME DRUG ADON: CPT

## 2022-09-27 RX ORDER — HYDROCODONE BITARTRATE AND ACETAMINOPHEN 5; 325 MG/1; MG/1
1 TABLET ORAL EVERY 6 HOURS PRN
Qty: 10 TABLET | Refills: 0 | Status: SHIPPED | OUTPATIENT
Start: 2022-09-27

## 2022-09-27 RX ORDER — MORPHINE SULFATE 4 MG/ML
4 INJECTION, SOLUTION INTRAMUSCULAR; INTRAVENOUS ONCE
Status: COMPLETED | OUTPATIENT
Start: 2022-09-27 | End: 2022-09-27

## 2022-09-27 RX ORDER — SODIUM CHLORIDE 9 MG/ML
125 INJECTION, SOLUTION INTRAVENOUS CONTINUOUS
Status: DISCONTINUED | OUTPATIENT
Start: 2022-09-27 | End: 2022-09-27

## 2022-09-27 RX ORDER — ONDANSETRON 4 MG/1
4 TABLET, ORALLY DISINTEGRATING ORAL EVERY 4 HOURS PRN
Qty: 10 TABLET | Refills: 0 | Status: SHIPPED | OUTPATIENT
Start: 2022-09-27 | End: 2022-10-04

## 2022-09-27 RX ORDER — HYDROCODONE BITARTRATE AND ACETAMINOPHEN 5; 325 MG/1; MG/1
1-2 TABLET ORAL EVERY 6 HOURS PRN
Qty: 10 TABLET | Refills: 0 | Status: SHIPPED | OUTPATIENT
Start: 2022-09-27 | End: 2022-09-27

## 2022-09-27 RX ORDER — ONDANSETRON 4 MG/1
4 TABLET, ORALLY DISINTEGRATING ORAL EVERY 4 HOURS PRN
Qty: 10 TABLET | Refills: 0 | Status: SHIPPED | OUTPATIENT
Start: 2022-09-27 | End: 2022-09-27

## 2022-09-27 RX ORDER — KETOROLAC TROMETHAMINE 15 MG/ML
15 INJECTION, SOLUTION INTRAMUSCULAR; INTRAVENOUS ONCE
Status: COMPLETED | OUTPATIENT
Start: 2022-09-27 | End: 2022-09-27

## 2022-11-06 ENCOUNTER — APPOINTMENT (OUTPATIENT)
Dept: GENERAL RADIOLOGY | Age: 67
End: 2022-11-06
Attending: PHYSICIAN ASSISTANT
Payer: MEDICARE

## 2022-11-06 ENCOUNTER — HOSPITAL ENCOUNTER (OUTPATIENT)
Age: 67
Discharge: HOME | End: 2022-11-06
Payer: MEDICARE

## 2022-11-06 VITALS
HEART RATE: 125 BPM | RESPIRATION RATE: 18 BRPM | BODY MASS INDEX: 25.58 KG/M2 | TEMPERATURE: 99 F | WEIGHT: 163 LBS | HEIGHT: 67 IN | DIASTOLIC BLOOD PRESSURE: 81 MMHG | OXYGEN SATURATION: 92 % | SYSTOLIC BLOOD PRESSURE: 123 MMHG

## 2022-11-06 DIAGNOSIS — J20.9 ACUTE BRONCHITIS WITH WHEEZING: ICD-10-CM

## 2022-11-06 DIAGNOSIS — B34.9 VIRAL SYNDROME: Primary | ICD-10-CM

## 2022-11-06 LAB
POCT INFLUENZA A: NEGATIVE
POCT INFLUENZA B: NEGATIVE
SARS-COV-2 RNA RESP QL NAA+PROBE: NOT DETECTED

## 2022-11-06 PROCEDURE — 99214 OFFICE O/P EST MOD 30 MIN: CPT

## 2022-11-06 PROCEDURE — 71046 X-RAY EXAM CHEST 2 VIEWS: CPT | Performed by: PHYSICIAN ASSISTANT

## 2022-11-06 PROCEDURE — 94640 AIRWAY INHALATION TREATMENT: CPT

## 2022-11-06 PROCEDURE — 87502 INFLUENZA DNA AMP PROBE: CPT | Performed by: PHYSICIAN ASSISTANT

## 2022-11-06 RX ORDER — ALBUTEROL SULFATE 2.5 MG/3ML
2.5 SOLUTION RESPIRATORY (INHALATION) EVERY 4 HOURS PRN
Qty: 30 EACH | Refills: 0 | Status: SHIPPED | OUTPATIENT
Start: 2022-11-06 | End: 2022-12-06

## 2022-11-06 RX ORDER — IBUPROFEN 600 MG/1
600 TABLET ORAL ONCE
Status: COMPLETED | OUTPATIENT
Start: 2022-11-06 | End: 2022-11-06

## 2022-11-06 RX ORDER — IPRATROPIUM BROMIDE AND ALBUTEROL SULFATE 2.5; .5 MG/3ML; MG/3ML
3 SOLUTION RESPIRATORY (INHALATION) ONCE
Status: COMPLETED | OUTPATIENT
Start: 2022-11-06 | End: 2022-11-06

## 2022-11-06 RX ORDER — PREDNISONE 20 MG/1
40 TABLET ORAL DAILY
Qty: 10 TABLET | Refills: 0 | Status: SHIPPED | OUTPATIENT
Start: 2022-11-07 | End: 2022-11-12

## 2022-11-06 RX ORDER — ALBUTEROL SULFATE 2.5 MG/3ML
2.5 SOLUTION RESPIRATORY (INHALATION) EVERY 4 HOURS PRN
Qty: 30 EACH | Refills: 0 | Status: SHIPPED | OUTPATIENT
Start: 2022-11-06 | End: 2022-11-06

## 2022-11-06 RX ORDER — PREDNISONE 20 MG/1
60 TABLET ORAL ONCE
Status: COMPLETED | OUTPATIENT
Start: 2022-11-06 | End: 2022-11-06

## 2022-11-06 NOTE — ED INITIAL ASSESSMENT (HPI)
Patient presents to IC with c/o cough(nonproductive)sob,sore throat and sinus congestion. h/o RSV pneumonia. Babysits grandkids Febrile today 100.5

## 2022-11-06 NOTE — DISCHARGE INSTRUCTIONS
Use albuterol nebulizer or inhaler every 4-6 hours start oral steroids tomorrow Tylenol as needed for fever push fluids and rest

## 2023-03-03 NOTE — PLAN OF CARE
Patient is Aox4. Patient maintaining O2 sats on RA, nebs, IV steroids. Patient is NSR on tele. Receiving eliquis per cards. Patient voids and is upself. Patient given robitussin w/ codeine d/t a cough. Given medications d/t headaches per MAR.  Encouraged to Quality 110: Preventive Care And Screening: Influenza Immunization: Influenza Immunization Administered during Influenza season Detail Level: Detailed services based on physician/LIP order or complex needs related to functional status, cognitive ability or social support system  Outcome: Progressing     Problem: RESPIRATORY - ADULT  Goal: Achieves optimal ventilation and oxygenation  Description  INTERVE

## 2023-08-30 ENCOUNTER — APPOINTMENT (OUTPATIENT)
Dept: GENERAL RADIOLOGY | Age: 68
End: 2023-08-30
Attending: NURSE PRACTITIONER
Payer: MEDICARE

## 2023-08-30 ENCOUNTER — HOSPITAL ENCOUNTER (OUTPATIENT)
Age: 68
Discharge: HOME | End: 2023-08-30
Payer: MEDICARE

## 2023-08-30 VITALS
TEMPERATURE: 97 F | WEIGHT: 160 LBS | DIASTOLIC BLOOD PRESSURE: 71 MMHG | OXYGEN SATURATION: 97 % | HEART RATE: 58 BPM | RESPIRATION RATE: 16 BRPM | BODY MASS INDEX: 25 KG/M2 | SYSTOLIC BLOOD PRESSURE: 134 MMHG

## 2023-08-30 DIAGNOSIS — S90.31XA CONTUSION OF RIGHT FOOT, INITIAL ENCOUNTER: Primary | ICD-10-CM

## 2023-08-30 DIAGNOSIS — S92.909A CLOSED FRACTURE OF FOOT, UNSPECIFIED LATERALITY, INITIAL ENCOUNTER: ICD-10-CM

## 2023-08-30 PROCEDURE — 73630 X-RAY EXAM OF FOOT: CPT | Performed by: NURSE PRACTITIONER

## 2023-08-30 PROCEDURE — 99213 OFFICE O/P EST LOW 20 MIN: CPT

## 2023-08-30 PROCEDURE — 29515 APPLICATION SHORT LEG SPLINT: CPT

## 2023-08-30 NOTE — ED INITIAL ASSESSMENT (HPI)
Patient c/o right foot second toe pain with swelling X1 week. Patient states she hit it on a [place] [employer] while trying to get in.

## 2023-12-24 ENCOUNTER — HOSPITAL ENCOUNTER (OUTPATIENT)
Age: 68
Discharge: HOME | End: 2023-12-24
Payer: MEDICARE

## 2023-12-24 VITALS
TEMPERATURE: 98 F | HEART RATE: 86 BPM | OXYGEN SATURATION: 98 % | DIASTOLIC BLOOD PRESSURE: 81 MMHG | RESPIRATION RATE: 18 BRPM | SYSTOLIC BLOOD PRESSURE: 135 MMHG

## 2023-12-24 DIAGNOSIS — B34.9 VIRAL ILLNESS: Primary | ICD-10-CM

## 2023-12-24 PROCEDURE — 99213 OFFICE O/P EST LOW 20 MIN: CPT

## 2023-12-24 PROCEDURE — 99212 OFFICE O/P EST SF 10 MIN: CPT

## 2023-12-24 PROCEDURE — 87502 INFLUENZA DNA AMP PROBE: CPT | Performed by: NURSE PRACTITIONER

## 2023-12-24 NOTE — ED INITIAL ASSESSMENT (HPI)
C/o sinus problem for 4 days. Pt reports nasal congestion, ear/head pressure. Pt reports family was sick. Pt reports otc meds used.

## 2024-03-20 PROBLEM — J06.9 VIRAL URI: Status: RESOLVED | Noted: 2020-01-18 | Resolved: 2024-03-20

## 2024-04-01 NOTE — PLAN OF CARE
Problem: Patient/Family Goals  Goal: Patient/Family Long Term Goal  Description: Patient's Long Term Goal: Discharge Home    Interventions:  - Pain Tolerance  -Diet Tolerance  - See additional Care Plan goals for specific interventions  Outcome: Progress assessment  - Modify environment to reduce risk of injury  - Provide assistive devices as appropriate  - Consider OT/PT consult to assist with strengthening/mobility  - Encourage toileting schedule  Outcome: Progressing     Problem: [address] routine/schedule  - Consider collaborating with pharmacy to review patient's medication profile  Outcome: Progressing Patient is a 32 y/o M c/o wound check. patient here for wound check to left 2nd and 3rd digit. Patient reports having sutures placed 5 days ago and insturcted to come back for wound check and to continue to take abx.

## 2024-06-29 ENCOUNTER — HOSPITAL ENCOUNTER (EMERGENCY)
Facility: HOSPITAL | Age: 69
Discharge: HOME | End: 2024-06-29
Attending: EMERGENCY MEDICINE
Payer: MEDICARE

## 2024-06-29 ENCOUNTER — APPOINTMENT (OUTPATIENT)
Dept: GENERAL RADIOLOGY | Facility: HOSPITAL | Age: 69
End: 2024-06-29
Attending: EMERGENCY MEDICINE
Payer: MEDICARE

## 2024-06-29 ENCOUNTER — APPOINTMENT (OUTPATIENT)
Dept: CT IMAGING | Facility: HOSPITAL | Age: 69
End: 2024-06-29
Attending: EMERGENCY MEDICINE
Payer: MEDICARE

## 2024-06-29 VITALS
OXYGEN SATURATION: 99 % | HEIGHT: 67 IN | BODY MASS INDEX: 26.37 KG/M2 | WEIGHT: 168 LBS | SYSTOLIC BLOOD PRESSURE: 135 MMHG | HEART RATE: 58 BPM | RESPIRATION RATE: 16 BRPM | TEMPERATURE: 98 F | DIASTOLIC BLOOD PRESSURE: 80 MMHG

## 2024-06-29 DIAGNOSIS — R07.89 CHEST PAIN, ATYPICAL: Primary | ICD-10-CM

## 2024-06-29 LAB
ALBUMIN SERPL-MCNC: 4 G/DL (ref 3.4–5)
ALBUMIN/GLOB SERPL: 0.9 {RATIO} (ref 1–2)
ALP LIVER SERPL-CCNC: 118 U/L
ALT SERPL-CCNC: 29 U/L
ANION GAP SERPL CALC-SCNC: 6 MMOL/L (ref 0–18)
AST SERPL-CCNC: 22 U/L (ref 15–37)
BASOPHILS # BLD AUTO: 0.06 X10(3) UL (ref 0–0.2)
BASOPHILS NFR BLD AUTO: 0.8 %
BILIRUB SERPL-MCNC: 0.5 MG/DL (ref 0.1–2)
BUN BLD-MCNC: 11 MG/DL (ref 9–23)
CALCIUM BLD-MCNC: 9.2 MG/DL (ref 8.5–10.1)
CHLORIDE SERPL-SCNC: 103 MMOL/L (ref 98–112)
CO2 SERPL-SCNC: 28 MMOL/L (ref 21–32)
CREAT BLD-MCNC: 0.86 MG/DL
D DIMER PPP FEU-MCNC: 1.94 UG/ML FEU (ref ?–0.67)
EGFRCR SERPLBLD CKD-EPI 2021: 74 ML/MIN/1.73M2 (ref 60–?)
EOSINOPHIL # BLD AUTO: 0.53 X10(3) UL (ref 0–0.7)
EOSINOPHIL NFR BLD AUTO: 6.9 %
ERYTHROCYTE [DISTWIDTH] IN BLOOD BY AUTOMATED COUNT: 12 %
GLOBULIN PLAS-MCNC: 4.3 G/DL (ref 2.8–4.4)
GLUCOSE BLD-MCNC: 97 MG/DL (ref 70–99)
HCT VFR BLD AUTO: 38.4 %
HGB BLD-MCNC: 13.1 G/DL
IMM GRANULOCYTES # BLD AUTO: 0.03 X10(3) UL (ref 0–1)
IMM GRANULOCYTES NFR BLD: 0.4 %
LYMPHOCYTES # BLD AUTO: 1.91 X10(3) UL (ref 1–4)
LYMPHOCYTES NFR BLD AUTO: 24.7 %
MCH RBC QN AUTO: 29 PG (ref 26–34)
MCHC RBC AUTO-ENTMCNC: 34.1 G/DL (ref 31–37)
MCV RBC AUTO: 85 FL
MONOCYTES # BLD AUTO: 0.81 X10(3) UL (ref 0.1–1)
MONOCYTES NFR BLD AUTO: 10.5 %
NEUTROPHILS # BLD AUTO: 4.38 X10 (3) UL (ref 1.5–7.7)
NEUTROPHILS # BLD AUTO: 4.38 X10(3) UL (ref 1.5–7.7)
NEUTROPHILS NFR BLD AUTO: 56.7 %
OSMOLALITY SERPL CALC.SUM OF ELEC: 283 MOSM/KG (ref 275–295)
PLATELET # BLD AUTO: 228 10(3)UL (ref 150–450)
POTASSIUM SERPL-SCNC: 3.4 MMOL/L (ref 3.5–5.1)
PROT SERPL-MCNC: 8.3 G/DL (ref 6.4–8.2)
RBC # BLD AUTO: 4.52 X10(6)UL
SODIUM SERPL-SCNC: 137 MMOL/L (ref 136–145)
TROPONIN I SERPL HS-MCNC: 8 NG/L
WBC # BLD AUTO: 7.7 X10(3) UL (ref 4–11)

## 2024-06-29 PROCEDURE — 96361 HYDRATE IV INFUSION ADD-ON: CPT

## 2024-06-29 PROCEDURE — 71045 X-RAY EXAM CHEST 1 VIEW: CPT | Performed by: EMERGENCY MEDICINE

## 2024-06-29 PROCEDURE — 96360 HYDRATION IV INFUSION INIT: CPT

## 2024-06-29 PROCEDURE — 93005 ELECTROCARDIOGRAM TRACING: CPT

## 2024-06-29 PROCEDURE — 85025 COMPLETE CBC W/AUTO DIFF WBC: CPT | Performed by: EMERGENCY MEDICINE

## 2024-06-29 PROCEDURE — 84484 ASSAY OF TROPONIN QUANT: CPT | Performed by: EMERGENCY MEDICINE

## 2024-06-29 PROCEDURE — 99285 EMERGENCY DEPT VISIT HI MDM: CPT

## 2024-06-29 PROCEDURE — 80053 COMPREHEN METABOLIC PANEL: CPT | Performed by: EMERGENCY MEDICINE

## 2024-06-29 PROCEDURE — 93010 ELECTROCARDIOGRAM REPORT: CPT

## 2024-06-29 PROCEDURE — 71275 CT ANGIOGRAPHY CHEST: CPT | Performed by: EMERGENCY MEDICINE

## 2024-06-29 PROCEDURE — 85379 FIBRIN DEGRADATION QUANT: CPT | Performed by: EMERGENCY MEDICINE

## 2024-06-29 NOTE — DISCHARGE INSTRUCTIONS
Diet and activity as tolerated.    You may take Tylenol or Advil for pain.    Return to the emergency department for new or worsening symptoms.

## 2024-06-29 NOTE — ED INITIAL ASSESSMENT (HPI)
Pt c/o cp last night, woke pt up out of sleep. Pain comes and goes on left side of chest. Does not radiate down the arm. + SOB. Denies nausea. Denies cardiac hx. Pt took 81 mg aspirin this morning. +tightness is what pt feels.

## 2024-06-29 NOTE — ED PROVIDER NOTES
Patient Seen in: [facility] Emergency Department      History     Chief Complaint   Patient presents with    Chest Pain Angina     Stated Complaint:     Subjective:   HPI    68-year-old female presents to the emergency department for evaluation of chest pain and difficulty breathing.  The patient stated that she had chest pressure in the anterior chest and also to the right side of her chest which began awakening her from sleep around 2 AM and has been constant since that time.  The patient also states that she has felt shortness of breath with exertion over the past couple of days.  No fever, cough or colored sputum production.  Patient has chronic bilateral leg pain, no worse than usual.  Remote history of a superficial thrombophlebitis.  Not currently anticoagulated.  No diaphoresis or nausea.    Objective:   Past Medical History:    AF (atrial fibrillation) RESOLVED SPONTANEOUSLY    Anemia, iron deficiency    Blood disorder    borderline hemopheliac-     BPPV (benign paroxysmal positional vertigo)    Deep vein thrombosis (HCC)    superficial only in leg from varicose vein    Depression DURING GRIEF REACTION AFTER SPOUSE'S PASSING.    denies- on sertraline for migraines    Disorder of thyroid    Eczema    Fibromyalgia    GERD (gastroesophageal reflux disease)    High blood pressure    High cholesterol    History of recent hospitalization    in Jan 2019-at - Breathing and heart problem    HTN, goal below 130/80    Hyperlipidemia LDL goal < 100    Migraine    GERARDO (obstructive sleep apnea)    AHI 10 RDI 11 REM AHI 9 Supine AHI 17 non-supine AHI 8 Sao2 Liam 63%     Osteopenia    PONV (postoperative nausea and vomiting)    Pre-diabetes    Problems with swallowing    feels like something stuck in throat at all times/ now resolved- 07/2019    Sleep apnea    cpap    Visual impairment    glasses prn    Vitamin D deficiency              Past Surgical History:   Procedure Laterality Date    Angiogram  7/31/11     OM spasm    Appendectomy      Colonoscopy      Colonoscopy  19= Diverticulosis    Repeat     Colonoscopy N/A 2019    Procedure: COLONOSCOPY;  Surgeon: Silvio Barnhart MD;  Location:  ENDOSCOPY    Colonoscopy N/A 3/16/2021    Procedure: COLONOSCOPY WITH DECOMPRESSION;  Surgeon: Heber Coffman MD;  Location:  ENDOSCOPY    Hysterectomy  2019    Other      Left wrist/ nerve release- 2018    Other surgical history      R PULMONARY LOBE-removed partial lobe AS INFANT DUE TO PNEUMONIA    Other surgical history      DENTAL    Other surgical history      CERVICAL HNP SURGERY    Other surgical history      R SHOULDER RCR    Other surgical history      R SHOULDER revision RCR    Other surgical history      B LARGE TOENAIL REMOVAL DUE TO SEVERE ONYCHOMYCOSIS    Other surgical history  2011    R  KNEE  ARTHROSCOPY    Other surgical history  2019    Apical suspension with uterosacral ligament suspension (Modified Cervantes culdoplasty), anterior colporraphy, transvaginal mid-urethral SOLYX sling,  cystoscopy- Dr. Walker     Other surgical history  2021    colostomy bag reversal    Salpingectomy Bilateral 2019    Tonsillectomy      T&A    Upper gi endoscopy performed  19= Hiatal hernia                Social History     Socioeconomic History    Marital status:      Spouse name: , MARILYN    Number of children: 2    Years of education: HIGH [employer]   Occupational History    Occupation:      Comment: [employer]   Tobacco Use    Smoking status: Never    Smokeless tobacco: Never   Vaping Use    Vaping status: Never Used   Substance and Sexual Activity    Alcohol use: Not Currently    Drug use: Never    Sexual activity: Not Currently     Partners: Male     Birth control/protection: Hysterectomy   Other Topics Concern     Service No    Blood Transfusions No    Caffeine Concern No     Comment: 2 SERVINGS TEA/D.    Weight Concern No    Special Diet No    Exercise Yes      Comment:  LOTS OF WALKING AND STANDING AT WORK; ALSO EXERCISES  WITH DTR.    Bike Helmet No    Seat Belt Yes   Social History Narrative    . LIVES WITH DAUGHTER. SINGLE FAMILY HOUSE.              Review of Systems    Positive for stated Chief Complaint: Chest Pain Angina    Other systems are as noted in HPI.  Constitutional and vital signs reviewed.      All other systems reviewed and negative except as noted above.    Physical Exam     ED Triage Vitals   BP 06/29/24 1138 146/87   Pulse 06/29/24 1138 63   Resp 06/29/24 1138 20   Temp 06/29/24 1140 97.9 °F (36.6 °C)   Temp src 06/29/24 1140 Oral   SpO2 06/29/24 1138 100 %   O2 Device 06/29/24 1138 None (Room air)       Current Vitals:   Vital Signs  BP: 135/80  Pulse: 58  Resp: 16  Temp: 97.9 °F (36.6 °C)  Temp src: Oral  MAP (mmHg): 96    Oxygen Therapy  SpO2: 99 %  O2 Device: None (Room air)            Physical Exam    General appearance: This is a middle-aged female sitting on a gurney.  Vital signs were reviewed per nurses notes.  Monitor reveals a sinus rhythm rate in the 50s.  Initial blood pressure was 135/90.  Patient is afebrile.  Pulse oximetry is 99% on room air.  HEENT: Normocephalic atraumatic.  Anicteric sclera.  Oral mucosa is moist.  Oropharynx is normal.  Neck: No adenopathy or thyromegaly.  Lungs are clear to auscultation.  Heart exam: Normal S1-S2 without extra sounds or murmurs.  Regular rate and rhythm.  Chest wall is nontender.  Abdomen is soft and nontender without masses or rebound.  Extremities are atraumatic.  Skin is dry without rashes or lesions.  Neuroexam: Awake, conversive and moving all 4 extremities well.    ED Course     Labs Reviewed   COMP METABOLIC PANEL (14) - Abnormal; Notable for the following components:       Result Value    Potassium 3.4 (*)     Total Protein 8.3 (*)     A/G Ratio 0.9 (*)     All other components within normal limits   D-DIMER - Abnormal; Notable for the following components:    D-Dimer 1.94 (*)      All other components within normal limits   TROPONIN I HIGH SENSITIVITY - Normal   CBC WITH DIFFERENTIAL WITH PLATELET    Narrative:     The following orders were created for panel order CBC With Differential With Platelet.  Procedure                               Abnormality         Status                     ---------                               -----------         ------                     CBC W/ DIFFERENTIAL[[id number]]                              Final result                 Please view results for these tests on the individual orders.   RAINBOW DRAW LAVENDER   RAINBOW DRAW LIGHT GREEN   RAINBOW DRAW BLUE   CBC W/ DIFFERENTIAL     EKG    Rate, intervals and axes as noted on EKG Report.  Rate: 63  Rhythm: Sinus Rhythm  Reading: Sinus arrhythmia.  Normal EKG.  Agree with EKG report.                 Intravenous access was obtained.  Laboratory studies were drawn.  Troponin was normal but D-dimer was elevated.  CTA of the chest was performed after chest x-ray was obtained.    CT ANGIOGRAPHY, CHEST (CPT=71275)    Result Date: 6/29/2024  PROCEDURE:  CT ANGIOGRAPHY, CHEST (CPT=71275)  COMPARISON:  [facility] CT, CT ANGIOGRAPHY, CHEST (CPT=71275), 4/17/2020, 7:14 AM.  INDICATIONS:  Chest pain and shortness of breath.  Elevated D-dimer.  Rule out PE.  TECHNIQUE:  IV contrast-enhanced multislice CT angiography is performed through the pulmonary arterial anatomy. 3D volume renderings are generated.  Dose reduction techniques were used. Dose information is transmitted to the ACR (American College of Radiology) NRDR (National Radiology Data Registry) which includes the Dose Index Registry.  PATIENT STATED HISTORY:(As transcribed by Technologist)  Patient is here for chest heaviness and shortness of breath.   CONTRAST USED:  80cc of Isovue 370  FINDINGS:  VASCULATURE:  No visible pulmonary arterial thrombus or attenuation.  THORACIC AORTA:  No aneurysm or visible dissection.  LUNGS:  Scattered bilateral subsegmental and  discoid atelectasis in the lower lobes.  No focal consolidation.  MEDIASTINUM:  There is a large hiatal hernia containing the majority of the stomach.  There is a heterogeneous enhancement of the thyroid gland with retrosternal extension of thyroid goiter.  CARDIAC:  No significant pericardial effusion PLEURA:  No pneumothorax or effusion.  CHEST WALL:  No enlarged axillary adenopathy.  LIMITED ABDOMEN:  Hepatic cyst at the dome of the right lobe of the liver measuring 1.7 cm. BONES:  Mild endplate hypertrophic changes thoracic spine.            CONCLUSION:  No evidence for pulmonary embolism.  No aortic dissection.  Large hiatal hernia containing the majority of the stomach.  Retrosternal thyroid goiter.  Scattered bilateral atelectasis.   LOCATION:  [facility]   Dictated by (CST): Kemi Mandujano MD on 6/29/2024 at 1:52 PM     Finalized by (CST): Kemi Mandujano MD on 6/29/2024 at 1:55 PM       XR CHEST AP PORTABLE  (CPT=71045)    Result Date: 6/29/2024  PROCEDURE:  XR CHEST AP PORTABLE  (CPT=71045)  TECHNIQUE:  AP chest radiograph was obtained.  COMPARISON:  [facility], XR CHEST PA + LAT CHEST (CLU=[id number]), 11/06/2022, 8:58 AM.  INDICATIONS:  chest pain  PATIENT STATED HISTORY: (As transcribed by Technologist)  patient states left sided chest discomfort consistently since yesterday and sharp intermittent pains on the right side.    FINDINGS:  Lungs are clear.  Cardiac silhouette is of normal size.  There is a hiatal hernia noted at the GE junction, projecting over the cardiac silhouette, similar to the prior exam.  Normal pulmonary vasculature.  No pleural disease.  No acute process.            CONCLUSION:  Lungs are clear.  No acute process or significant interval changes.  Stable retrocardiac hiatal hernia.   LOCATION:  [facility]      Dictated by (CST): Jose Ivan DO on 6/29/2024 at 12:15 PM     Finalized by (CST): Jose Ivan DO on 6/29/2024 at 12:16 PM       I personally reviewed the images myself and went  over results with patient.    I viewed the chest x-ray and CTA of the chest films myself.  No evidence of pneumonia or pneumothorax.  There is presence of a large hiatal hernia.    Test results and treatment plan were discussed prior to disposition.       MDM      #1.  Atypical chest pain.  Hours of symptoms with a normal troponin suggest this is not myocardial ischemia.  No evidence of pneumonia, pneumothorax or pulmonary embolism.  Symptoms may be related to hiatal hernia.  Recommended outpatient follow-up.  Cautioned to return for new or worsening symptoms.                                   Medical Decision Making      Disposition and Plan     Clinical Impression:  1. Chest pain, atypical         Disposition:  Discharge  6/29/2024  1:58 pm    Follow-up:  Rachana Rodríguez MD  [address] [address] [phone]    Call in 2 day(s)  As needed          Medications Prescribed:  Current Discharge Medication List

## 2024-06-30 LAB
ATRIAL RATE: 63 BPM
P AXIS: 62 DEGREES
P-R INTERVAL: 172 MS
Q-T INTERVAL: 422 MS
QRS DURATION: 92 MS
QTC CALCULATION (BEZET): 431 MS
R AXIS: 57 DEGREES
T AXIS: 67 DEGREES
VENTRICULAR RATE: 63 BPM

## 2025-04-16 NOTE — ED INITIAL ASSESSMENT (HPI)
Is This A New Presentation, Or A Follow-Up?: Skin Lesion
Swelling since yesterday to BLE, history of superficial clot
How Severe Is Your Skin Lesion?: moderate

## 2025-05-03 ENCOUNTER — APPOINTMENT (OUTPATIENT)
Dept: CT IMAGING | Age: 70
End: 2025-05-03
Attending: NURSE PRACTITIONER
Payer: MEDICARE

## 2025-05-03 ENCOUNTER — APPOINTMENT (OUTPATIENT)
Dept: GENERAL RADIOLOGY | Age: 70
End: 2025-05-03
Attending: NURSE PRACTITIONER
Payer: MEDICARE

## 2025-05-03 ENCOUNTER — HOSPITAL ENCOUNTER (OUTPATIENT)
Age: 70
Discharge: HOME | End: 2025-05-03
Payer: MEDICARE

## 2025-05-03 VITALS
RESPIRATION RATE: 22 BRPM | TEMPERATURE: 99 F | HEART RATE: 106 BPM | OXYGEN SATURATION: 96 % | DIASTOLIC BLOOD PRESSURE: 75 MMHG | SYSTOLIC BLOOD PRESSURE: 133 MMHG

## 2025-05-03 DIAGNOSIS — J18.9 COMMUNITY ACQUIRED PNEUMONIA OF RIGHT MIDDLE LOBE OF LUNG: Primary | ICD-10-CM

## 2025-05-03 LAB
#MXD IC: 1.3 X10ˆ3/UL (ref 0.1–1)
ATRIAL RATE: 109 BPM
BUN BLD-MCNC: 8 MG/DL (ref 7–18)
CHLORIDE BLD-SCNC: 98 MMOL/L (ref 98–112)
CO2 BLD-SCNC: 24 MMOL/L (ref 21–32)
CREAT BLD-MCNC: 0.9 MG/DL (ref 0.55–1.02)
DDIMER WHOLE BLOOD: 464 NG/ML DDU (ref ?–400)
EGFRCR SERPLBLD CKD-EPI 2021: 70 ML/MIN/1.73M2 (ref 60–?)
GLUCOSE BLD-MCNC: 105 MG/DL (ref 70–99)
HCT VFR BLD AUTO: 39.1 % (ref 35–48)
HCT VFR BLD CALC: 39 % (ref 34–50)
HGB BLD-MCNC: 12.6 G/DL (ref 12–16)
ISTAT IONIZED CALCIUM FOR CHEM 8: 1.11 MMOL/L (ref 1.12–1.32)
LYMPHOCYTES # BLD AUTO: 1.6 X10ˆ3/UL (ref 1–4)
LYMPHOCYTES NFR BLD AUTO: 12.4 %
MCH RBC QN AUTO: 28.1 PG (ref 26–34)
MCHC RBC AUTO-ENTMCNC: 32.2 G/DL (ref 31–37)
MCV RBC AUTO: 87.1 FL (ref 80–100)
MIXED CELL %: 9.9 %
NEUTROPHILS # BLD AUTO: 10.4 X10ˆ3/UL (ref 1.5–7.7)
NEUTROPHILS NFR BLD AUTO: 77.7 %
P AXIS: 60 DEGREES
P-R INTERVAL: 148 MS
PLATELET # BLD AUTO: 154 X10ˆ3/UL (ref 150–450)
POCT INFLUENZA A: NEGATIVE
POCT INFLUENZA B: NEGATIVE
POTASSIUM BLD-SCNC: 3.7 MMOL/L (ref 3.6–5.1)
Q-T INTERVAL: 334 MS
QRS DURATION: 88 MS
QTC CALCULATION (BEZET): 449 MS
R AXIS: 46 DEGREES
RBC # BLD AUTO: 4.49 X10ˆ6/UL (ref 3.8–5.3)
SARS-COV-2 RNA RESP QL NAA+PROBE: NOT DETECTED
SODIUM BLD-SCNC: 134 MMOL/L (ref 136–145)
T AXIS: 45 DEGREES
TROPONIN I BLD-MCNC: <0.02 NG/ML (ref ?–0.05)
VENTRICULAR RATE: 109 BPM
WBC # BLD AUTO: 13.3 X10ˆ3/UL (ref 4–11)

## 2025-05-03 PROCEDURE — 80047 BASIC METABLC PNL IONIZED CA: CPT

## 2025-05-03 PROCEDURE — 99214 OFFICE O/P EST MOD 30 MIN: CPT

## 2025-05-03 PROCEDURE — 85025 COMPLETE CBC W/AUTO DIFF WBC: CPT | Performed by: NURSE PRACTITIONER

## 2025-05-03 PROCEDURE — 96374 THER/PROPH/DIAG INJ IV PUSH: CPT

## 2025-05-03 PROCEDURE — 71275 CT ANGIOGRAPHY CHEST: CPT | Performed by: NURSE PRACTITIONER

## 2025-05-03 PROCEDURE — 85378 FIBRIN DEGRADE SEMIQUANT: CPT | Performed by: NURSE PRACTITIONER

## 2025-05-03 PROCEDURE — 93010 ELECTROCARDIOGRAM REPORT: CPT

## 2025-05-03 PROCEDURE — 93005 ELECTROCARDIOGRAM TRACING: CPT

## 2025-05-03 PROCEDURE — 71046 X-RAY EXAM CHEST 2 VIEWS: CPT | Performed by: NURSE PRACTITIONER

## 2025-05-03 PROCEDURE — 99215 OFFICE O/P EST HI 40 MIN: CPT

## 2025-05-03 PROCEDURE — 87502 INFLUENZA DNA AMP PROBE: CPT | Performed by: NURSE PRACTITIONER

## 2025-05-03 PROCEDURE — 84484 ASSAY OF TROPONIN QUANT: CPT

## 2025-05-03 RX ORDER — ALBUTEROL SULFATE 90 UG/1
2 INHALANT RESPIRATORY (INHALATION) EVERY 4 HOURS PRN
Qty: 1 EACH | Refills: 0 | Status: SHIPPED | OUTPATIENT
Start: 2025-05-03 | End: 2025-06-02

## 2025-05-03 RX ORDER — CODEINE PHOSPHATE AND GUAIFENESIN 10; 100 MG/5ML; MG/5ML
SOLUTION ORAL NIGHTLY PRN
Qty: 60 ML | Refills: 0 | Status: SHIPPED | OUTPATIENT
Start: 2025-05-03 | End: 2025-05-09

## 2025-05-03 RX ORDER — KETOROLAC TROMETHAMINE 30 MG/ML
15 INJECTION, SOLUTION INTRAMUSCULAR; INTRAVENOUS ONCE
Status: COMPLETED | OUTPATIENT
Start: 2025-05-03 | End: 2025-05-03

## 2025-05-03 RX ORDER — AZITHROMYCIN 250 MG/1
TABLET, FILM COATED ORAL
Qty: 6 TABLET | Refills: 0 | Status: SHIPPED | OUTPATIENT
Start: 2025-05-03 | End: 2025-05-08

## 2025-05-03 NOTE — ED PROVIDER NOTES
Patient Seen in: [facility]      History     Chief Complaint   Patient presents with    Cough/URI           Headache    Sore Throat     Stated Complaint: Headache    Subjective:   HPI  69-year-old with atrial fibrillation, DVT, hyperlipidemia, and migraines presents complaining of congestion, headache, body aches, and a cough for the past few days.  No fever.  Her grandkids are sick with similar symptoms at home.  Patient reports history of pneumonia and states this feels similar.    Objective:     Past Medical History:    AF (atrial fibrillation) RESOLVED SPONTANEOUSLY    Anemia, iron deficiency    Blood disorder    borderline hemopheliac-     BPPV (benign paroxysmal positional vertigo)    Deep vein thrombosis (HCC)    superficial only in leg from varicose vein    Depression DURING GRIEF REACTION AFTER SPOUSE'S PASSING.    denies- on sertraline for migraines    Disorder of thyroid    Eczema    Fibromyalgia    GERD (gastroesophageal reflux disease)    High blood pressure    High cholesterol    History of recent hospitalization    in Jan 2019-at - Breathing and heart problem    HTN, goal below 130/80    Hyperlipidemia LDL goal < 100    Migraine    GERARDO (obstructive sleep apnea)    AHI 10 RDI 11 REM AHI 9 Supine AHI 17 non-supine AHI 8 Sao2 Liam 63%     Osteopenia    PONV (postoperative nausea and vomiting)    Pre-diabetes    Problems with swallowing    feels like something stuck in throat at all times/ now resolved- 07/2019    Sleep apnea    cpap    Visual impairment    glasses prn    Vitamin D deficiency              Past Surgical History:   Procedure Laterality Date    Angiogram  7/31/11    OM spasm    Appendectomy      Colonoscopy      Colonoscopy  4/12/19= Diverticulosis    Repeat 2029    Colonoscopy N/A 4/12/2019    Procedure: COLONOSCOPY;  Surgeon: Silvio Barnhart MD;  Location:  ENDOSCOPY    Colonoscopy N/A 3/16/2021    Procedure: COLONOSCOPY WITH DECOMPRESSION;  Surgeon: Heber Coffman MD;   Location:  ENDOSCOPY    Hysterectomy  2019    Other      Left wrist/ nerve release- 2018    Other surgical history      R PULMONARY LOBE-removed partial lobe AS INFANT DUE TO PNEUMONIA    Other surgical history      DENTAL    Other surgical history      CERVICAL HNP SURGERY    Other surgical history      R SHOULDER RCR    Other surgical history      R SHOULDER revision RCR    Other surgical history      B LARGE TOENAIL REMOVAL DUE TO SEVERE ONYCHOMYCOSIS    Other surgical history  2011    R  KNEE  ARTHROSCOPY    Other surgical history  2019    Apical suspension with uterosacral ligament suspension (Modified Cervantes culdoplasty), anterior colporraphy, transvaginal mid-urethral SOLYX sling,  cystoscopy- Dr. Walker     Other surgical history  2021    colostomy bag reversal    Salpingectomy Bilateral 2019    Tonsillectomy      T&A    Upper gi endoscopy performed  19= Hiatal hernia                Social History     Socioeconomic History    Marital status:      Spouse name: , MARILYN    Number of children: 2    Years of education: HIGH [employer]   Occupational History    Occupation:      Comment: [employer]   Tobacco Use    Smoking status: Never    Smokeless tobacco: Never   Vaping Use    Vaping status: Never Used   Substance and Sexual Activity    Alcohol use: Not Currently    Drug use: Never    Sexual activity: Not Currently     Partners: Male     Birth control/protection: Hysterectomy   Other Topics Concern     Service No    Blood Transfusions No    Caffeine Concern No     Comment: 2 SERVINGS TEA/D.    Weight Concern No    Special Diet No    Exercise Yes     Comment:  LOTS OF WALKING AND STANDING AT WORK; ALSO EXERCISES  WITH DTR.    Bike Helmet No    Seat Belt Yes   Social History Narrative    . LIVES WITH DAUGHTER. SINGLE FAMILY HOUSE.     Social Drivers of Health     Food Insecurity: No Food Insecurity (2025)    Received from [facility]     NCSS - Food Insecurity     Worried About Running Out of Food in the Last Year: No     Ran Out of Food in the Last Year: No   Transportation Needs: No Transportation Needs (1/19/2025)    Received from [facility] - Transportation     Lack of Transportation: No   Housing Stability: Not At Risk (1/19/2025)    Received from [facility] - Housing/Utilities     Has Housing: Yes     Worried About Losing Housing: No     Unable to Get Utilities: No              Review of Systems   All other systems reviewed and are negative.      Positive for stated complaint: Headache  Other systems are as noted in HPI.  Constitutional and vital signs reviewed.      All other systems reviewed and negative except as noted above.                  Physical Exam     ED Triage Vitals [05/03/25 0913]   /75   Pulse 116   Resp 16   Temp 98.8 °F (37.1 °C)   Temp src Oral   SpO2 95 %   O2 Device None (Room air)       Current Vitals:   Vital Signs  BP: 133/75  Pulse: 120  Resp: 22  Temp: 98.8 °F (37.1 °C)  Temp src: Oral    Oxygen Therapy  SpO2: 96 %  O2 Device: None (Room air)        Physical Exam  Vitals and nursing note reviewed.   Constitutional:       General: She is not in acute distress.     Appearance: She is well-developed. She is not ill-appearing or toxic-appearing.   HENT:      Right Ear: Tympanic membrane, ear canal and external ear normal.      Left Ear: Tympanic membrane, ear canal and external ear normal.      Nose: Congestion present. No rhinorrhea.      Mouth/Throat:      Pharynx: No oropharyngeal exudate or posterior oropharyngeal erythema.   Cardiovascular:      Rate and Rhythm: Normal rate and regular rhythm.      Heart sounds: Normal heart sounds.   Pulmonary:      Effort: Pulmonary effort is normal.      Comments: Crackles to the right lower lobe  Skin:     General: Skin is warm and dry.   Neurological:      Mental Status: She is alert and oriented to person, place, and time.            ED Course     Labs Reviewed   POCT CBC - Abnormal; Notable for the following components:       Result Value    WBC IC 13.3 (*)     # Neutrophil 10.4 (*)     # Mixed Cells 1.3 (*)     All other components within normal limits   D-DIMER (POC) - Abnormal; Notable for the following components:    D-Dimer  (*)     All other components within normal limits   POCT ISTAT CHEM8 CARTRIDGE - Abnormal; Notable for the following components:    ISTAT Sodium 134 (*)     ISTAT Ionized Calcium 1.11 (*)     ISTAT Glucose 105 (*)     All other components within normal limits   ISTAT TROPONIN - Normal   POCT FLU TEST - Normal    Narrative:     This assay is a rapid molecular in vitro test utilizing nucleic acid amplification of influenza A and B viral RNA.   RAPID SARS-COV-2 BY PCR - Normal          Results          CTA CHEST (CPT=71275)  Result Date: 5/3/2025  CONCLUSION:   1. Negative for pulmonary embolism.  2. Nodular and patchy ground-glass opacities of the right middle and left lower lobes, likely infectious/inflammatory.  Recommend imaging follow-up to resolution.   3. Moderate paraesophageal hiatal hernia.     LOCATION:  [facility]   Dictated by (CST): Sadi Valencia MD on 5/03/2025 at 12:16 PM     Finalized by (CST): Sadi Valencia MD on 5/03/2025 at 12:22 PM       XR CHEST PA + LAT CHEST (MGS=[id number])  Result Date: 5/3/2025  CONCLUSION:  Heart size within normal limits.  Moderate hiatal hernia, stable.  No pulmonary edema or focal airspace consolidation.  The pleural spaces are clear.   LOCATION:  [facility]   Dictated by (CST): Sadi Valencia MD on 5/03/2025 at 10:01 AM     Finalized by (CST): Sadi Valencia MD on 5/03/2025 at 10:02 AM     Interval and axis as noted on EKG report.  Rate:109  Sinus tachycardia   Nonspecific ST and T wave abnormality   Abnormal ECG   When compared with ECG of 29-JUN-2024 11:37,   Vent. rate has increased BY  46 BPM   T wave inversion now evident in Inferior leads   Nonspecific T wave abnormality  now evident in Lateral leads                    MDM       Medical Decision Making  69-year-old with atrial fibrillation, DVT, hyperlipidemia, and migraines presents complaining of congestion, headache, body aches, and a cough for the past few days.  No fever.  Her grandkids are sick with similar symptoms at home.  Patient reports history of pneumonia and states this feels similar.    Pertinent Labs & Imaging studies reviewed. (See chart for details).  Patient coming in with viral symptoms.   Differential diagnosis includes but not limited to COVID, flu, virus, bronchitis, pneumonia   Labs reviewed COVID and flu negative. CBC with mildly elevated white count at 13.3.  CHEM is normal.  Negative troponin with positive dimer. Radiology chest x-ray was normal.  CTA with negative PE however there was right middle and left lower lobe groundglass opacities.  Will treat for pneumonia.  Will discharge on Augmentin, Z-Valeriy, and guaifenesin with codeine as needed, albuterol prn . Patient/Parent is comfortable with this plan.    Overall Pt looks good. Non-toxic, well-hydrated and in no respiratory distress. Vital signs are reassuring. Exam is reassuring. I do not believe pt requires and additional diagnostic studies or intervention. I believe pt can be discharged home to continue evaluation as an outpatient. Follow-up provider given. Discharge instructions given and reviewed. Return for any problems. All understand and agree with the plan. Dr. Wallace agreed with plan of care and discharge. No hypoxia. Tachycardia improved.        Problems Addressed:  Community acquired pneumonia of right middle lobe of lung: acute illness or injury    Amount and/or Complexity of Data Reviewed  Radiology: ordered and independent interpretation performed.     Details: Chest x-ray ordered and independently interpreted by myself as no consolidation        Disposition and Plan     Clinical Impression:  1. Community acquired pneumonia of right middle  lobe of lung         Disposition:  Discharge  5/3/2025 12:29 pm    Follow-up:  No follow-up provider specified.        Medications Prescribed:  Current Discharge Medication List        START taking these medications    Details   amoxicillin clavulanate 875-125 MG Oral Tab Take 1 tablet by mouth 2 (two) times daily for 7 days.  Qty: 14 tablet, Refills: 0      azithromycin (ZITHROMAX Z-AR) 250 MG Oral Tab 500 mg once followed by 250 mg daily x 4 days  Qty: 6 tablet, Refills: 0      !! albuterol 108 (90 Base) MCG/ACT Inhalation Aero Soln Inhale 2 puffs into the lungs every 4 (four) hours as needed for Wheezing.  Qty: 1 each, Refills: 0      guaiFENesin-codeine 100-10 MG/5ML Oral Solution Take 5-10 mL by mouth nightly as needed.  Qty: 60 mL, Refills: 0    Associated Diagnoses: Community acquired pneumonia of right middle lobe of lung       !! - Potential duplicate medications found. Please discuss with provider.          Supplementary Documentation:

## 2025-05-03 NOTE — ED INITIAL ASSESSMENT (HPI)
Pt states headache started early evening last night. Pain starts in the front of head and radiates to the back. Pt states she has a lot of congestion and bodyaches. Nonproductive cough. Denies fevers. Slight sore throat.

## 2025-06-25 ENCOUNTER — APPOINTMENT (OUTPATIENT)
Dept: ULTRASOUND IMAGING | Facility: HOSPITAL | Age: 70
End: 2025-06-25
Payer: MEDICARE

## 2025-06-25 ENCOUNTER — HOSPITAL ENCOUNTER (EMERGENCY)
Facility: HOSPITAL | Age: 70
Discharge: HOME | End: 2025-06-25
Attending: EMERGENCY MEDICINE
Payer: MEDICARE

## 2025-06-25 VITALS
HEIGHT: 67 IN | SYSTOLIC BLOOD PRESSURE: 170 MMHG | TEMPERATURE: 98 F | DIASTOLIC BLOOD PRESSURE: 95 MMHG | RESPIRATION RATE: 18 BRPM | BODY MASS INDEX: 26.68 KG/M2 | WEIGHT: 170 LBS | HEART RATE: 92 BPM | OXYGEN SATURATION: 100 %

## 2025-06-25 DIAGNOSIS — R60.0 PERIPHERAL EDEMA: Primary | ICD-10-CM

## 2025-06-25 LAB
ALBUMIN SERPL-MCNC: 4.9 G/DL (ref 3.2–4.8)
ALBUMIN/GLOB SERPL: 1.6 {RATIO} (ref 1–2)
ALP LIVER SERPL-CCNC: 86 U/L (ref 55–142)
ALT SERPL-CCNC: 17 U/L (ref 10–49)
ANION GAP SERPL CALC-SCNC: 9 MMOL/L (ref 0–18)
AST SERPL-CCNC: 21 U/L (ref ?–34)
BASOPHILS # BLD AUTO: 0.02 X10(3) UL (ref 0–0.2)
BASOPHILS NFR BLD AUTO: 0.1 %
BILIRUB SERPL-MCNC: 0.2 MG/DL (ref 0.2–1.1)
BUN BLD-MCNC: 14 MG/DL (ref 9–23)
CALCIUM BLD-MCNC: 9.7 MG/DL (ref 8.7–10.6)
CHLORIDE SERPL-SCNC: 103 MMOL/L (ref 98–112)
CO2 SERPL-SCNC: 27 MMOL/L (ref 21–32)
CREAT BLD-MCNC: 0.96 MG/DL (ref 0.55–1.02)
EGFRCR SERPLBLD CKD-EPI 2021: 64 ML/MIN/1.73M2 (ref 60–?)
EOSINOPHIL # BLD AUTO: 0 X10(3) UL (ref 0–0.7)
EOSINOPHIL NFR BLD AUTO: 0 %
ERYTHROCYTE [DISTWIDTH] IN BLOOD BY AUTOMATED COUNT: 12.8 %
GLOBULIN PLAS-MCNC: 3.1 G/DL (ref 2–3.5)
GLUCOSE BLD-MCNC: 144 MG/DL (ref 70–99)
HCT VFR BLD AUTO: 34 % (ref 35–48)
HGB BLD-MCNC: 11.3 G/DL (ref 12–16)
IMM GRANULOCYTES # BLD AUTO: 0.1 X10(3) UL (ref 0–1)
IMM GRANULOCYTES NFR BLD: 0.6 %
LYMPHOCYTES # BLD AUTO: 1.25 X10(3) UL (ref 1–4)
LYMPHOCYTES NFR BLD AUTO: 7.3 %
MCH RBC QN AUTO: 28.6 PG (ref 26–34)
MCHC RBC AUTO-ENTMCNC: 33.2 G/DL (ref 31–37)
MCV RBC AUTO: 86.1 FL (ref 80–100)
MONOCYTES # BLD AUTO: 0.54 X10(3) UL (ref 0.1–1)
MONOCYTES NFR BLD AUTO: 3.1 %
NEUTROPHILS # BLD AUTO: 15.31 X10 (3) UL (ref 1.5–7.7)
NEUTROPHILS # BLD AUTO: 15.31 X10(3) UL (ref 1.5–7.7)
NEUTROPHILS NFR BLD AUTO: 88.9 %
NT-PROBNP SERPL-MCNC: 878 PG/ML (ref ?–125)
OSMOLALITY SERPL CALC.SUM OF ELEC: 291 MOSM/KG (ref 275–295)
PLATELET # BLD AUTO: 285 10(3)UL (ref 150–450)
POTASSIUM SERPL-SCNC: 3.7 MMOL/L (ref 3.5–5.1)
PROT SERPL-MCNC: 8 G/DL (ref 5.7–8.2)
RBC # BLD AUTO: 3.95 X10(6)UL (ref 3.8–5.3)
SODIUM SERPL-SCNC: 139 MMOL/L (ref 136–145)
WBC # BLD AUTO: 17.2 X10(3) UL (ref 4–11)

## 2025-06-25 PROCEDURE — 99284 EMERGENCY DEPT VISIT MOD MDM: CPT

## 2025-06-25 PROCEDURE — 36415 COLL VENOUS BLD VENIPUNCTURE: CPT

## 2025-06-25 PROCEDURE — 80053 COMPREHEN METABOLIC PANEL: CPT

## 2025-06-25 PROCEDURE — 80053 COMPREHEN METABOLIC PANEL: CPT | Performed by: EMERGENCY MEDICINE

## 2025-06-25 PROCEDURE — 85025 COMPLETE CBC W/AUTO DIFF WBC: CPT | Performed by: EMERGENCY MEDICINE

## 2025-06-25 PROCEDURE — 85025 COMPLETE CBC W/AUTO DIFF WBC: CPT

## 2025-06-25 PROCEDURE — 83880 ASSAY OF NATRIURETIC PEPTIDE: CPT | Performed by: EMERGENCY MEDICINE

## 2025-06-25 PROCEDURE — 93970 EXTREMITY STUDY: CPT

## 2025-06-25 NOTE — ED INITIAL ASSESSMENT (HPI)
Pt ambulatory to ED c/o swelling and pain to bilateral lower legs, right leg worse than left, redness noted both legs, sent by cardiologist office to r/o blood clot, endorse CINDY with excerption, denies chest pain

## 2025-06-26 NOTE — ED PROVIDER NOTES
Patient Seen in: [facility] Emergency Department        History  Chief Complaint   Patient presents with    Swelling Edema     Stated Complaint: bilateral leg swelling, R>L.  sent to r/o a blood clot.    Subjective:   HPI            Patient is a 69-year-old female presents to ED for evaluation of leg swelling.  Patient complains of bilateral leg swelling in the ankles up to the mid shins for about a week right greater than left.  She think she had a peripheral clot in her leg in the past that did not require blood thinners.  She denies any shortness of breath or chest pain.  Called cardiology office today and sent them to rule out DVT.  Patient takes Lasix 20 mg daily which she has been compliant with.      Objective:     No pertinent past medical history.            No pertinent past surgical history.              No pertinent social history.                              Physical Exam    ED Triage Vitals [06/25/25 1815]   BP (!) 154/94   Pulse 96   Resp 18   Temp 98 °F (36.7 °C)   Temp src Temporal   SpO2 93 %   O2 Device None (Room air)       Current Vitals:   Vital Signs  BP: (!) 170/95  Pulse: 92  Resp: 18  Temp: 98.4 °F (36.9 °C)  Temp src: Temporal    Oxygen Therapy  SpO2: 100 %  O2 Device: None (Room air)            Physical Exam  GENERAL: No acute distress, well appearing and non-toxic, Alert and oriented X 3   HEENT: Normocephalic, atraumatic.  Moist mucous membranes.  Pupils equal round reactive to light and accommodation, extraocular motion is intact, sclerae white, conjunctiva is pink.  Oropharynx is unremarkable, no exudate.  NECK: Supple, trachea midline, no lymphadenopathy.   LUNG: Lungs clear to auscultation bilaterally, no wheezing, no rales, no rhonchi.  CARDIOVASCULAR: Regular rate and rhythm.  Normal S1S2.  No S3S4 or murmur.  ABDOMEN: Bowel sounds are present. Soft. nondistended, no pulsatile masses. nontender  MUSCULOSKELETAL: No calf tenderness.  Dorsalis and Posterior Tibial pulses  present. No clubbing. No cyanosis.   trace pedal edema and edema around the ankles and lower shins but no warmth erythema noted.  Full range of motion bilateral ankles.  SKIN EXAMINATIoN: Warm and dry with normal appearance.  No rashes or lesions.  NEUROLOGICAL:  Motor strength intact all groups.  normal sensation, speech intact        ED Course  Labs Reviewed   COMP METABOLIC PANEL (14) - Abnormal; Notable for the following components:       Result Value    Glucose 144 (*)     Albumin 4.9 (*)     All other components within normal limits   CBC WITH DIFFERENTIAL WITH PLATELET - Abnormal; Notable for the following components:    WBC 17.2 (*)     HGB 11.3 (*)     HCT 34.0 (*)     Neutrophil Absolute Prelim 15.31 (*)     Neutrophil Absolute 15.31 (*)     All other components within normal limits   PRO BETA NATRIURETIC PEPTIDE   RAINBOW DRAW LAVENDER   RAINBOW DRAW LIGHT GREEN   RAINBOW DRAW BLUE          US VENOUS DOPPLER LEG BILAT - DIAG IMG (CPT=93970)  Result Date: 6/25/2025  PROCEDURE: US VENOUS DOPPLER LEG BILAT - DIAG IMG (CPT=93970) INDICATIONS: Leg swelling. COMPARISON: No comparisons. TECHNIQUE:  Real time, grey scale, and duplex ultrasound was used to evaluate the lower extremity venous system. B-mode two-dimensional images of the vascular structures, Doppler spectral analysis, and color flow.  Doppler imaging were performed.  The following veins were imaged:  Common, deep, and superficial femoral, popliteal, sapheno-femoral junction, posterior tibial veins, and the contralateral common femoral vein. FINDINGS: SAPHENOFEMORAL JUNCTION: No reflux. THROMBI: None visible. COMPRESSION: Normal compressibility, phasicity, and augmentation. OTHER: Bakers cyst 2.1 x 1.1 x 1.4 cm.     CONCLUSION: No DVT bilateral extremities.  Electronically Verified and Signed by Attending Radiologist: Travis Lyle MD 6/25/2025 8:37 PM Workstation: [id number]                      [facility]     Patient is a 69-year-old female presents to ED  for evaluation of swollen legs.  Differential CHF, cellulitis, DVT, venous insufficiency.  Patient went laboratory testing.  Hemoglobin 11.3.  White blood cell count 17.2.  Lungs are clear.  Bilateral lower extremity ultrasounds obtained and negative for DVT.  Symptoms likely due to venous insufficiency.  She has no chest pain or shortness of breath.  Patient is already on Lasix 20 mg a day and recommend increasing to 40 mg a day until seen by cardiology office in a couple days which she already has an appointment with.    Patient was screened and evaluated during this visit.   As a treating physician attending to the patient, I determined, within reasonable clinical confidence and prior to discharge, that an emergency medical condition was not or was no longer present.  There was no indication for further evaluation, treatment or admission on an emergency basis.  Comprehensive verbal and written discharge and follow-up instructions were provided to help prevent relapse or worsening.  Patient was instructed to follow-up with her primary care provider for further evaluation and treatment, but to return immediately to the ER for worsening, concerning, new, changing or persisting symptoms.  I discussed the case with the patient and they had no questions, complaints, or concerns.  Patient felt comfortable going home.      External and old record review was performed.  I reviewed echocardiogram 2022    1. Left ventricle: The cavity size was normal. Wall thickness was at the      upper limits of normal. Systolic function was normal. The estimated      ejection fraction was 60-65%. Doppler parameters are consistent with      abnormal left ventricular relaxation - grade 1 diastolic dysfunction.   2. Regional wall motion abnormality: Mild hypokinesis of the basal      inferolateral myocardium.   3. Left atrium: The left atrium was mildly to moderately dilated.   4. Right ventricle: Systolic pressure was at the upper limits  of normal.   5. Right atrium: The atrium was mildly dilated.   6. Pulmonary arteries: PA peak pressure: 31mm Hg (S).           MDM    Disposition and Plan     Clinical Impression:  1. Peripheral edema         Disposition:  Discharge  6/25/2025  9:10 pm    Follow-up:  Rachana Rodríguez MD  [address]  [facility] [address] [phone]    Follow up  As needed    Misael Chi MD  [address]  [facility] [id number] [phone]    Follow up in 2 day(s)            Medications Prescribed:  Current Discharge Medication List                Supplementary Documentation:

## 2025-06-26 NOTE — DISCHARGE INSTRUCTIONS
Elevate legs    Wear compression hose    May increase lasix and take 40 mg daily until you see cardiology on friday

## (undated) DEVICE — SOL  .9 1000ML BTL

## (undated) DEVICE — #15 STERILE STAINLESS BLADE: Brand: STERILE STAINLESS BLADES

## (undated) DEVICE — NEEDLE ANCHOR 1824-3D

## (undated) DEVICE — GAUZE SPONGES,USP TYPE VII GAUZE, 12 PLY: Brand: CURITY

## (undated) DEVICE — Device: Brand: DEFENDO AIR/WATER/SUCTION AND BIOPSY VALVE

## (undated) DEVICE — BLADE ELECTRODE: Brand: EDGE

## (undated) DEVICE — LAPAROTOMY SPONGE - RF AND X-RAY DETECTABLE PRE-WASHED: Brand: SITUATE

## (undated) DEVICE — STAPLER CIRCULAR ENDO 29MM

## (undated) DEVICE — SUTURE SILK 3-0 SH

## (undated) DEVICE — DRESSING AQUACEL AG 3.5X12

## (undated) DEVICE — DRAPE LEGGING STERILE

## (undated) DEVICE — PROXIMATE RELOADABLE LINEAR CUTTER WITH SAFETY LOCK-OUT, 75MM: Brand: PROXIMATE

## (undated) DEVICE — POOLE SUCTION HANDLE: Brand: CARDINAL HEALTH

## (undated) DEVICE — SIGMOIDOSCOPE LIGHTED BIOSEAL

## (undated) DEVICE — TOWEL SURG OR 17X30IN BLUE

## (undated) DEVICE — LAPAROTOMY CDS: Brand: MEDLINE INDUSTRIES, INC.

## (undated) DEVICE — SUTURE SILK 2-0

## (undated) DEVICE — SUTURE PROLENE 2-0 SH

## (undated) DEVICE — RETRACTOR LONE STAR STAYS BLNT

## (undated) DEVICE — Device

## (undated) DEVICE — SPONGE STICK WITH PVP-I: Brand: KENDALL

## (undated) DEVICE — GOWN,SIRUS,FABRIC-REINFORCED,X-LARGE: Brand: MEDLINE

## (undated) DEVICE — CHLORAPREP 26ML APPLICATOR

## (undated) DEVICE — ROTH NET FOOD BOLUS

## (undated) DEVICE — REM POLYHESIVE ADULT PATIENT RETURN ELECTRODE: Brand: VALLEYLAB

## (undated) DEVICE — SOL  .9 1000ML BAG

## (undated) DEVICE — STANDARD HYPODERMIC NEEDLE,POLYPROPYLENE HUB: Brand: MONOJECT

## (undated) DEVICE — SUTURE SILK 3-0

## (undated) DEVICE — LIGHT HANDLE

## (undated) DEVICE — FILTERLINE NASAL ADULT O2/CO2

## (undated) DEVICE — SUTURE CHROMIC GUT 3-0 SH

## (undated) DEVICE — VIOLET BRAIDED (POLYGLACTIN 910), SYNTHETIC ABSORBABLE SUTURE: Brand: COATED VICRYL

## (undated) DEVICE — KENDALL SCD EXPRESS SLEEVES, KNEE LENGTH, MEDIUM: Brand: KENDALL SCD

## (undated) DEVICE — SUTURE PDS II 1 ST-21

## (undated) DEVICE — LIGASURE IMPACT OPEN DEVICE

## (undated) DEVICE — PROVIDES A STERILE INTERFACE BETWEEN THE OPERATING ROOM SURGICAL LAMPS (NON-STERILE) AND THE SURGEON OR NURSE (STERILE).: Brand: STERION®CLAMP COVER FABRIC

## (undated) DEVICE — SUTURE PDS II 1 CT-1

## (undated) DEVICE — 1010 S-DRAPE TOWEL DRAPE 10/BX: Brand: STERI-DRAPE™

## (undated) DEVICE — 3M™ TEGADERM™ TRANSPARENT FILM DRESSING, 1626W, 4 IN X 4-3/4 IN (10 CM X 12 CM), 50 EACH/CARTON, 4 CARTON/CASE: Brand: 3M™ TEGADERM™

## (undated) DEVICE — CONTOUR CURVED CUTTER STAPLER: Brand: CONTOUR

## (undated) DEVICE — COVER,MAYO STAND,STERILE: Brand: MEDLINE

## (undated) DEVICE — STERILE SYNTHETIC POLYISOPRENE POWDER-FREE SURGICAL GLOVES WITH HYDROGEL COATING, SMOOTH FINISH, STRAIGHT FINGER: Brand: PROTEXIS

## (undated) DEVICE — 40580 - THE PINK PAD - ADVANCED TRENDELENBURG POSITIONING KIT: Brand: 40580 - THE PINK PAD - ADVANCED TRENDELENBURG POSITIONING KIT

## (undated) DEVICE — SUTURE VICRYL 3-0 SH

## (undated) DEVICE — 3M™ RED DOT™ MONITORING ELECTRODE WITH FOAM TAPE AND STICKY GEL, 50/BAG, 20/CASE, 72/PLT 2570: Brand: RED DOT™

## (undated) DEVICE — SUTURE SILK 0

## (undated) DEVICE — STERILE POLYISOPRENE POWDER-FREE SURGICAL GLOVES: Brand: PROTEXIS

## (undated) DEVICE — SNARE CAPTI HEX STIFF MEDIUM

## (undated) DEVICE — BULB SYRINGE,IRRIGATION WITH PROTECTIVE CAP: Brand: DOVER

## (undated) DEVICE — NET SPECIMEN RETRIEVAL BLUE

## (undated) DEVICE — DEVICE SPEC RTRVL RPTR 2.4MM

## (undated) DEVICE — 1200CC GUARDIAN II: Brand: GUARDIAN

## (undated) DEVICE — ENDOSCOPY PACK - LOWER: Brand: MEDLINE INDUSTRIES, INC.

## (undated) DEVICE — PROXIMATE SKIN STAPLERS (35 WIDE) CONTAINS 35 STAINLESS STEEL STAPLES (FIXED HEAD): Brand: PROXIMATE

## (undated) DEVICE — FORCEP BIOPSY RJ4 LG CAP W/ND

## (undated) DEVICE — SCD SLEEVE KNEE HI BLEND

## (undated) DEVICE — PROXIMATE LINEAR CUTTER RELOAD, BLUE, 75MM: Brand: PROXIMATE

## (undated) DEVICE — HEX-LOCKING BLADE ELECTRODE: Brand: EDGE

## (undated) DEVICE — PROCTO SWABS: Brand: DEROYAL

## (undated) DEVICE — SPONGE: SPECIALTY PEANUT XR 100/CS: Brand: MEDICAL ACTION INDUSTRIES

## (undated) DEVICE — GYN CDS: Brand: MEDLINE INDUSTRIES, INC.

## (undated) DEVICE — MEDI-VAC NON-CONDUCTIVE SUCTION TUBING: Brand: CARDINAL HEALTH

## (undated) DEVICE — RETRACTOR LONE STAR LARGE

## (undated) DEVICE — DRAPE,UNDRBUT,WHT GRAD PCH,CAPPORT,20/CS: Brand: MEDLINE

## (undated) DEVICE — STERILE SYNTHETIC POLYISOPRENE POWDER-FREE SURGICAL GLOVES WITH HYDROGEL COATING: Brand: PROTEXIS

## (undated) DEVICE — INTENT TO BE USED WITH SUTURE MATERIAL FOR TISSUE CLOSURE: Brand: RICHARD-ALLAN® NEEDLE 1/2 CIRCLE TAPER

## (undated) DEVICE — TUBING CYSTO

## (undated) DEVICE — DRAPE EQUIPMENT INTRATEMP THER

## (undated) DEVICE — 1910 FOAM BLOCK NEEDLE COUNTER: Brand: DEVON

## (undated) DEVICE — GAMMEX® PI HYBRID SIZE 6, STERILE POWDER-FREE SURGICAL GLOVE, POLYISOPRENE AND NEOPRENE BLEND: Brand: GAMMEX

## (undated) DEVICE — SUTURE PROLENE 0 CT-2

## (undated) DEVICE — ENDOSCOPY PACK UPPER: Brand: MEDLINE INDUSTRIES, INC.

## (undated) DEVICE — GAMMEX® PI HYBRID SIZE 6.5, STERILE POWDER-FREE SURGICAL GLOVE, POLYISOPRENE AND NEOPRENE BLEND: Brand: GAMMEX

## (undated) DEVICE — CONTOUR CURVED CUTTER STAPLER RELOAD: Brand: CONTOUR

## (undated) NOTE — ED AVS SNAPSHOT
German Hemphill   MRN: [id number]    Department:  [facility] Emergency Department   Date of Visit:  2/15/2019           Disclosure     Insurance plans vary and the physician(s) referred by the ER may not be covered by your plan.  Please contact yo tell this physician (or your personal doctor if your instructions are to return to your personal doctor) about any new or lasting problems. The primary care or specialist physician will see patients referred from the [facility] Emergency Department.  Waqas Gama

## (undated) NOTE — LETTER
Marsha Moon [address]  [address] [facility]  Authorization for Surgical Operation and Procedure     Date:___________                                                                                                         Time:__________ the potential risks that can occur: fever and allergic reactions, hemolytic reactions, transmission of diseases such as Hepatitis, AIDS and Cytomegalovirus (CMV) and fluid overload.   In the event that I wish to have an autologous transfusion of my own bloo physician will determine when the applicable recovery period ends for purposes of reinstating the DNAR order.   10. Patients having a sterilization procedure: I understand that if the procedure is successful the results will be permanent and it will therefo anesthesiologist (anesthesia doctor) to give me medicine and do additional procedures as necessary.  Some examples are: Starting or using an “IV” to give me medicine, fluids or blood during my procedure, and having a breathing tube placed to help me breathe “epidural”, & “nerve blocks”): I understand that rare but potential complications include headache, bleeding, infection, seizure, irregular heart rhythms, and nerve injury.     I can change my mind about having anesthesia services at any time before I get

## (undated) NOTE — ED AVS SNAPSHOT
Kaelyn Kenya   MRN: [id number]    Department:  [facility] Emergency Department   Date of Visit:  4/13/2019           Disclosure     Insurance plans vary and the physician(s) referred by the ER may not be covered by your plan.  Please contact yo tell this physician (or your personal doctor if your instructions are to return to your personal doctor) about any new or lasting problems. The primary care or specialist physician will see patients referred from the [facility] Emergency Department.  Kyra James

## (undated) NOTE — LETTER
Marsha Moon [address]  [place], [address] [facility]    Consent for Operation  Date: __________________                                Time: _______________    1.  I authorize the performance upon Shreya Ramirez the following operation:  Procedur procedure has been videotaped, the surgeon will obtain the original videotape. The hospital will not be responsible for storage or maintenance of this tape.   8. For the purpose of advancing medical education, I consent to the admittance of observers to the STATEMENTS REQUIRING INSERTION OR COMPLETION WERE FILLED IN.     Signature of Patient:   ___________________________    When the patient is a minor or mentally incompetent to give consent:  Signature of person authorized to consent for patient: ____________ supplements, and pills I can buy without a prescription (including street drugs/illegal medications). Failure to inform my anesthesiologist about these medicines may increase my risk of anesthetic complications. iv.  If I am allergic to anything or have ha Anesthesiologist Signature     Date   Time  I have discussed the procedure and information above with the patient (or patient’s representative) and answered their questions. The patient or their representative has agreed to have anesthesia services.     ___

## (undated) NOTE — LETTER
Marsha Moon [address]  [address] [facility]  Authorization for Surgical Operation and Procedure     Date:___________                                                                                                         Time:__________ occur: fever and allergic reactions, hemolytic reactions, transmission of diseases such as Hepatitis, AIDS and Cytomegalovirus (CMV) and fluid overload.   In the event that I wish to have an autologous transfusion of my own blood, or a directed donor transf applicable recovery period ends for purposes of reinstating the DNAR order.   10. Patients having a sterilization procedure: I understand that if the procedure is successful the results will be permanent and it will therefore be impossible for me to insemin me medicine and do additional procedures as necessary. Some examples are: Starting or using an “IV” to give me medicine, fluids or blood during my procedure, and having a breathing tube placed to help me breathe when I’m asleep (intubation).  In the event t but potential complications include headache, bleeding, infection, seizure, irregular heart rhythms, and nerve injury.     I can change my mind about having anesthesia services at any time before I get the medicine.    ______________________________________

## (undated) NOTE — LETTER
Marsha Moon 89  [address] [facility] [address], [address] [facility]  Authorization for Surgical Operation and Procedure     Date:___________                                                                                                         Time:__________ potential risks that can occur: fever and allergic reactions, hemolytic reactions, transmission of diseases such as Hepatitis, AIDS and Cytomegalovirus (CMV) and fluid overload.   In the event that I wish to have an autologous transfusion of my own blood, o will determine when the applicable recovery period ends for purposes of reinstating the DNAR order.   10. Patients having a sterilization procedure: I understand that if the procedure is successful the results will be permanent and it will therefore be impo (anesthesia doctor) to give me medicine and do additional procedures as necessary.  Some examples are: Starting or using an “IV” to give me medicine, fluids or blood during my procedure, and having a breathing tube placed to help me breathe when I’m asleep blocks”): I understand that rare but potential complications include headache, bleeding, infection, seizure, irregular heart rhythms, and nerve injury.     I can change my mind about having anesthesia services at any time before I get the medicine.    ____

## (undated) NOTE — LETTER
Marsha Moon 89  [address] [facility] [address], [address] [facility]  Authorization for Surgical Operation and Procedure     Date:___________                                                                                                         Time:__________ the potential risks that can occur: fever and allergic reactions, hemolytic reactions, transmission of diseases such as Hepatitis, AIDS and Cytomegalovirus (CMV) and fluid overload.   In the event that I wish to have an autologous transfusion of my own bloo physician will determine when the applicable recovery period ends for purposes of reinstating the DNAR order.   10. Patients having a sterilization procedure: I understand that if the procedure is successful the results will be permanent and it will therefo anesthesiologist (anesthesia doctor) to give me medicine and do additional procedures as necessary.  Some examples are: Starting or using an “IV” to give me medicine, fluids or blood during my procedure, and having a breathing tube placed to help me breathe “epidural”, & “nerve blocks”): I understand that rare but potential complications include headache, bleeding, infection, seizure, irregular heart rhythms, and nerve injury.     I can change my mind about having anesthesia services at any time before I get

## (undated) NOTE — LETTER
Marsha Moon 89  [address] [facility] [address], [address] [facility]  Authorization for Surgical Operation and Procedure     Date:___________                                                                                                         Time:__________ occur: fever and allergic reactions, hemolytic reactions, transmission of diseases such as Hepatitis, AIDS and Cytomegalovirus (CMV) and fluid overload.   In the event that I wish to have an autologous transfusion of my own blood, or a directed donor transf applicable recovery period ends for purposes of reinstating the DNAR order.   10. Patients having a sterilization procedure: I understand that if the procedure is successful the results will be permanent and it will therefore be impossible for me to insemin me medicine and do additional procedures as necessary. Some examples are: Starting or using an “IV” to give me medicine, fluids or blood during my procedure, and having a breathing tube placed to help me breathe when I’m asleep (intubation).  In the event t but potential complications include headache, bleeding, infection, seizure, irregular heart rhythms, and nerve injury.     I can change my mind about having anesthesia services at any time before I get the medicine.    ______________________________________

## (undated) NOTE — LETTER
Marsha Moon 89  [address] [facility] [address], 209 [facility]  Authorization for Surgical Operation and Procedure     Date:___________                                                                                                         Time:__________ occur: fever and allergic reactions, hemolytic reactions, transmission of diseases such as Hepatitis, AIDS and Cytomegalovirus (CMV) and fluid overload.   In the event that I wish to have an autologous transfusion of my own blood, or a directed donor transf applicable recovery period ends for purposes of reinstating the DNAR order.   10. Patients having a sterilization procedure: I understand that if the procedure is successful the results will be permanent and it will therefore be impossible for me to insemin me medicine and do additional procedures as necessary. Some examples are: Starting or using an “IV” to give me medicine, fluids or blood during my procedure, and having a breathing tube placed to help me breathe when I’m asleep (intubation).  In the event t but potential complications include headache, bleeding, infection, seizure, irregular heart rhythms, and nerve injury.     I can change my mind about having anesthesia services at any time before I get the medicine.    ______________________________________

## (undated) NOTE — LETTER
Marsha Moon 89  [address] [facility] [address], [address] [facility]  Authorization for Surgical Operation and Procedure     Date:___________                                                                                                         Time:__________ occur: fever and allergic reactions, hemolytic reactions, transmission of diseases such as Hepatitis, AIDS and Cytomegalovirus (CMV) and fluid overload.   In the event that I wish to have an autologous transfusion of my own blood, or a directed donor transf applicable recovery period ends for purposes of reinstating the DNAR order.   10. Patients having a sterilization procedure: I understand that if the procedure is successful the results will be permanent and it will therefore be impossible for me to insemin me medicine and do additional procedures as necessary. Some examples are: Starting or using an “IV” to give me medicine, fluids or blood during my procedure, and having a breathing tube placed to help me breathe when I’m asleep (intubation).  In the event t but potential complications include headache, bleeding, infection, seizure, irregular heart rhythms, and nerve injury.     I can change my mind about having anesthesia services at any time before I get the medicine.    ______________________________________

## (undated) NOTE — LETTER
Marsha Moon 89  [address] [facility] [address], 209 [facility]  Authorization for Surgical Operation and Procedure     Date:___________                                                                                                         Time:__________ hemolytic reactions, transmission of diseases such as Hepatitis, AIDS and Cytomegalovirus (CMV) and fluid overload. In the event that I wish to have an autologous transfusion of my own blood, or a directed donor transfusion.   I will discuss this with my p purposes of reinstating the DNAR order. 10. Patients having a sterilization procedure: I understand that if the procedure is successful the results will be permanent and it will therefore be impossible for me to inseminate, conceive, or bear children.   I procedures as necessary. Some examples are: Starting or using an “IV” to give me medicine, fluids or blood during my procedure, and having a breathing tube placed to help me breathe when I’m asleep (intubation).  In the event that my heart stops working pro include headache, bleeding, infection, seizure, irregular heart rhythms, and nerve injury.     I can change my mind about having anesthesia services at any time before I get the medicine.    __________________________________________________________________

## (undated) NOTE — LETTER
Marsha Moon 89  [address] [facility] [address], [address] [facility]  Authorization for Surgical Operation and Procedure     Date:___________                                                                                                         Time:__________ occur: fever and allergic reactions, hemolytic reactions, transmission of diseases such as Hepatitis, AIDS and Cytomegalovirus (CMV) and fluid overload.   In the event that I wish to have an autologous transfusion of my own blood, or a directed donor transf applicable recovery period ends for purposes of reinstating the DNAR order.   10. Patients having a sterilization procedure: I understand that if the procedure is successful the results will be permanent and it will therefore be impossible for me to insemin me medicine and do additional procedures as necessary. Some examples are: Starting or using an “IV” to give me medicine, fluids or blood during my procedure, and having a breathing tube placed to help me breathe when I’m asleep (intubation).  In the event t but potential complications include headache, bleeding, infection, seizure, irregular heart rhythms, and nerve injury.     I can change my mind about having anesthesia services at any time before I get the medicine.    ______________________________________